# Patient Record
Sex: FEMALE | Race: WHITE | Employment: OTHER | URBAN - NONMETROPOLITAN AREA
[De-identification: names, ages, dates, MRNs, and addresses within clinical notes are randomized per-mention and may not be internally consistent; named-entity substitution may affect disease eponyms.]

---

## 2021-07-13 ENCOUNTER — HOSPITAL ENCOUNTER (EMERGENCY)
Age: 76
Discharge: ANOTHER HOSPITAL | End: 2021-07-14
Attending: EMERGENCY MEDICINE
Payer: MEDICARE

## 2021-07-13 ENCOUNTER — APPOINTMENT (OUTPATIENT)
Dept: GENERAL RADIOLOGY | Age: 76
End: 2021-07-13
Payer: MEDICARE

## 2021-07-13 DIAGNOSIS — I21.4 NSTEMI (NON-ST ELEVATED MYOCARDIAL INFARCTION) (HCC): Primary | ICD-10-CM

## 2021-07-13 DIAGNOSIS — I48.91 ATRIAL FIBRILLATION WITH RVR (HCC): ICD-10-CM

## 2021-07-13 LAB
ABSOLUTE EOS #: 0.23 K/UL (ref 0–0.44)
ABSOLUTE IMMATURE GRANULOCYTE: 0.04 K/UL (ref 0–0.3)
ABSOLUTE LYMPH #: 2.41 K/UL (ref 1.1–3.7)
ABSOLUTE MONO #: 0.92 K/UL (ref 0.1–1.2)
ALBUMIN SERPL-MCNC: 4.5 G/DL (ref 3.5–5.2)
ALBUMIN/GLOBULIN RATIO: 1.9 (ref 1–2.5)
ALP BLD-CCNC: 63 U/L (ref 35–104)
ALT SERPL-CCNC: 42 U/L (ref 5–33)
ANION GAP SERPL CALCULATED.3IONS-SCNC: 12 MMOL/L (ref 9–17)
AST SERPL-CCNC: 29 U/L
BASOPHILS # BLD: 1 % (ref 0–2)
BASOPHILS ABSOLUTE: 0.05 K/UL (ref 0–0.2)
BILIRUB SERPL-MCNC: 0.31 MG/DL (ref 0.3–1.2)
BNP INTERPRETATION: ABNORMAL
BUN BLDV-MCNC: 15 MG/DL (ref 8–23)
BUN/CREAT BLD: 23 (ref 9–20)
CALCIUM SERPL-MCNC: 9.2 MG/DL (ref 8.6–10.4)
CHLORIDE BLD-SCNC: 103 MMOL/L (ref 98–107)
CO2: 23 MMOL/L (ref 20–31)
CREAT SERPL-MCNC: 0.64 MG/DL (ref 0.5–0.9)
D-DIMER QUANTITATIVE: 0.37 MG/L FEU (ref 0–0.59)
DIFFERENTIAL TYPE: ABNORMAL
EOSINOPHILS RELATIVE PERCENT: 3 % (ref 1–4)
GFR AFRICAN AMERICAN: >60 ML/MIN
GFR NON-AFRICAN AMERICAN: >60 ML/MIN
GFR SERPL CREATININE-BSD FRML MDRD: ABNORMAL ML/MIN/{1.73_M2}
GFR SERPL CREATININE-BSD FRML MDRD: ABNORMAL ML/MIN/{1.73_M2}
GLUCOSE BLD-MCNC: 148 MG/DL (ref 70–99)
HCT VFR BLD CALC: 45.9 % (ref 36.3–47.1)
HEMOGLOBIN: 15.3 G/DL (ref 11.9–15.1)
IMMATURE GRANULOCYTES: 1 %
INR BLD: 1
LYMPHOCYTES # BLD: 28 % (ref 24–43)
MCH RBC QN AUTO: 30.4 PG (ref 25.2–33.5)
MCHC RBC AUTO-ENTMCNC: 33.3 G/DL (ref 28.4–34.8)
MCV RBC AUTO: 91.3 FL (ref 82.6–102.9)
MONOCYTES # BLD: 11 % (ref 3–12)
NRBC AUTOMATED: 0 PER 100 WBC
PDW BLD-RTO: 13.2 % (ref 11.8–14.4)
PLATELET # BLD: 150 K/UL (ref 138–453)
PLATELET ESTIMATE: ABNORMAL
PMV BLD AUTO: 10.4 FL (ref 8.1–13.5)
POTASSIUM SERPL-SCNC: 3.8 MMOL/L (ref 3.7–5.3)
PRO-BNP: 1046 PG/ML
PROTHROMBIN TIME: 13 SEC (ref 11.5–14.2)
RBC # BLD: 5.03 M/UL (ref 3.95–5.11)
RBC # BLD: ABNORMAL 10*6/UL
SEG NEUTROPHILS: 56 % (ref 36–65)
SEGMENTED NEUTROPHILS ABSOLUTE COUNT: 4.93 K/UL (ref 1.5–8.1)
SODIUM BLD-SCNC: 138 MMOL/L (ref 135–144)
TOTAL PROTEIN: 6.9 G/DL (ref 6.4–8.3)
TROPONIN INTERP: ABNORMAL
TROPONIN T: ABNORMAL NG/ML
TROPONIN, HIGH SENSITIVITY: 23 NG/L (ref 0–14)
WBC # BLD: 8.6 K/UL (ref 3.5–11.3)
WBC # BLD: ABNORMAL 10*3/UL

## 2021-07-13 PROCEDURE — 96375 TX/PRO/DX INJ NEW DRUG ADDON: CPT

## 2021-07-13 PROCEDURE — 96376 TX/PRO/DX INJ SAME DRUG ADON: CPT

## 2021-07-13 PROCEDURE — 36415 COLL VENOUS BLD VENIPUNCTURE: CPT

## 2021-07-13 PROCEDURE — 2500000003 HC RX 250 WO HCPCS: Performed by: EMERGENCY MEDICINE

## 2021-07-13 PROCEDURE — 84484 ASSAY OF TROPONIN QUANT: CPT

## 2021-07-13 PROCEDURE — 80053 COMPREHEN METABOLIC PANEL: CPT

## 2021-07-13 PROCEDURE — 83880 ASSAY OF NATRIURETIC PEPTIDE: CPT

## 2021-07-13 PROCEDURE — 85025 COMPLETE CBC W/AUTO DIFF WBC: CPT

## 2021-07-13 PROCEDURE — 71045 X-RAY EXAM CHEST 1 VIEW: CPT

## 2021-07-13 PROCEDURE — 99285 EMERGENCY DEPT VISIT HI MDM: CPT

## 2021-07-13 PROCEDURE — 84443 ASSAY THYROID STIM HORMONE: CPT

## 2021-07-13 PROCEDURE — 6370000000 HC RX 637 (ALT 250 FOR IP): Performed by: EMERGENCY MEDICINE

## 2021-07-13 PROCEDURE — 85379 FIBRIN DEGRADATION QUANT: CPT

## 2021-07-13 PROCEDURE — 85610 PROTHROMBIN TIME: CPT

## 2021-07-13 PROCEDURE — 93005 ELECTROCARDIOGRAM TRACING: CPT | Performed by: EMERGENCY MEDICINE

## 2021-07-13 PROCEDURE — 6360000002 HC RX W HCPCS: Performed by: EMERGENCY MEDICINE

## 2021-07-13 PROCEDURE — 2580000003 HC RX 258: Performed by: EMERGENCY MEDICINE

## 2021-07-13 RX ORDER — FENTANYL CITRATE 50 UG/ML
50 INJECTION, SOLUTION INTRAMUSCULAR; INTRAVENOUS ONCE
Status: COMPLETED | OUTPATIENT
Start: 2021-07-13 | End: 2021-07-13

## 2021-07-13 RX ORDER — DILTIAZEM HYDROCHLORIDE 5 MG/ML
10 INJECTION INTRAVENOUS ONCE
Status: COMPLETED | OUTPATIENT
Start: 2021-07-13 | End: 2021-07-13

## 2021-07-13 RX ORDER — 0.9 % SODIUM CHLORIDE 0.9 %
1000 INTRAVENOUS SOLUTION INTRAVENOUS ONCE
Status: COMPLETED | OUTPATIENT
Start: 2021-07-13 | End: 2021-07-14

## 2021-07-13 RX ORDER — ASPIRIN 81 MG/1
324 TABLET, CHEWABLE ORAL ONCE
Status: COMPLETED | OUTPATIENT
Start: 2021-07-13 | End: 2021-07-13

## 2021-07-13 RX ADMIN — DILTIAZEM HYDROCHLORIDE 10 MG: 5 INJECTION INTRAVENOUS at 23:39

## 2021-07-13 RX ADMIN — FENTANYL CITRATE 50 MCG: 50 INJECTION INTRAMUSCULAR; INTRAVENOUS at 23:39

## 2021-07-13 RX ADMIN — ASPIRIN 324 MG: 81 TABLET, CHEWABLE ORAL at 23:39

## 2021-07-13 RX ADMIN — SODIUM CHLORIDE 1000 ML: 9 INJECTION, SOLUTION INTRAVENOUS at 23:39

## 2021-07-13 ASSESSMENT — PAIN DESCRIPTION - PAIN TYPE: TYPE: ACUTE PAIN

## 2021-07-13 ASSESSMENT — PAIN DESCRIPTION - LOCATION: LOCATION: CHEST

## 2021-07-13 ASSESSMENT — PAIN SCALES - GENERAL
PAINLEVEL_OUTOF10: 7
PAINLEVEL_OUTOF10: 7

## 2021-07-14 VITALS
SYSTOLIC BLOOD PRESSURE: 115 MMHG | OXYGEN SATURATION: 94 % | WEIGHT: 195 LBS | RESPIRATION RATE: 16 BRPM | HEART RATE: 85 BPM | DIASTOLIC BLOOD PRESSURE: 71 MMHG | TEMPERATURE: 97.2 F | BODY MASS INDEX: 38.28 KG/M2 | HEIGHT: 60 IN

## 2021-07-14 LAB
EKG ATRIAL RATE: 136 BPM
EKG ATRIAL RATE: 340 BPM
EKG Q-T INTERVAL: 308 MS
EKG Q-T INTERVAL: 318 MS
EKG QRS DURATION: 72 MS
EKG QRS DURATION: 76 MS
EKG QTC CALCULATION (BAZETT): 438 MS
EKG QTC CALCULATION (BAZETT): 442 MS
EKG R AXIS: -11 DEGREES
EKG R AXIS: -16 DEGREES
EKG T AXIS: -23 DEGREES
EKG T AXIS: -9 DEGREES
EKG VENTRICULAR RATE: 114 BPM
EKG VENTRICULAR RATE: 124 BPM
PARTIAL THROMBOPLASTIN TIME: 23.7 SEC (ref 23.9–33.8)
TROPONIN INTERP: ABNORMAL
TROPONIN T: ABNORMAL NG/ML
TROPONIN, HIGH SENSITIVITY: 85 NG/L (ref 0–14)
TSH SERPL DL<=0.05 MIU/L-ACNC: 2.21 MIU/L (ref 0.3–5)

## 2021-07-14 PROCEDURE — 6360000002 HC RX W HCPCS: Performed by: EMERGENCY MEDICINE

## 2021-07-14 PROCEDURE — 96368 THER/DIAG CONCURRENT INF: CPT

## 2021-07-14 PROCEDURE — 96367 TX/PROPH/DG ADDL SEQ IV INF: CPT

## 2021-07-14 PROCEDURE — 2500000003 HC RX 250 WO HCPCS: Performed by: EMERGENCY MEDICINE

## 2021-07-14 PROCEDURE — 93010 ELECTROCARDIOGRAM REPORT: CPT | Performed by: INTERNAL MEDICINE

## 2021-07-14 PROCEDURE — 2580000003 HC RX 258: Performed by: EMERGENCY MEDICINE

## 2021-07-14 PROCEDURE — 6370000000 HC RX 637 (ALT 250 FOR IP): Performed by: EMERGENCY MEDICINE

## 2021-07-14 PROCEDURE — 85730 THROMBOPLASTIN TIME PARTIAL: CPT

## 2021-07-14 PROCEDURE — 84484 ASSAY OF TROPONIN QUANT: CPT

## 2021-07-14 PROCEDURE — 36415 COLL VENOUS BLD VENIPUNCTURE: CPT

## 2021-07-14 PROCEDURE — 96376 TX/PRO/DX INJ SAME DRUG ADON: CPT

## 2021-07-14 PROCEDURE — 96365 THER/PROPH/DIAG IV INF INIT: CPT

## 2021-07-14 PROCEDURE — 96366 THER/PROPH/DIAG IV INF ADDON: CPT

## 2021-07-14 PROCEDURE — 93005 ELECTROCARDIOGRAM TRACING: CPT | Performed by: EMERGENCY MEDICINE

## 2021-07-14 RX ORDER — DILTIAZEM HYDROCHLORIDE 5 MG/ML
INJECTION INTRAVENOUS
Status: DISCONTINUED
Start: 2021-07-14 | End: 2021-07-14 | Stop reason: HOSPADM

## 2021-07-14 RX ORDER — DEXTROSE MONOHYDRATE 50 MG/ML
INJECTION, SOLUTION INTRAVENOUS
Status: DISCONTINUED
Start: 2021-07-14 | End: 2021-07-14 | Stop reason: HOSPADM

## 2021-07-14 RX ORDER — LOSARTAN POTASSIUM 50 MG/1
50 TABLET ORAL DAILY
COMMUNITY
End: 2022-01-18 | Stop reason: ALTCHOICE

## 2021-07-14 RX ORDER — HEPARIN SODIUM 10000 [USP'U]/100ML
11.3 INJECTION, SOLUTION INTRAVENOUS CONTINUOUS
Status: DISCONTINUED | OUTPATIENT
Start: 2021-07-14 | End: 2021-07-14 | Stop reason: HOSPADM

## 2021-07-14 RX ORDER — ROSUVASTATIN CALCIUM 20 MG/1
20 TABLET, COATED ORAL DAILY
COMMUNITY
End: 2021-07-23

## 2021-07-14 RX ORDER — HEPARIN SODIUM 1000 [USP'U]/ML
2000 INJECTION, SOLUTION INTRAVENOUS; SUBCUTANEOUS PRN
Status: DISCONTINUED | OUTPATIENT
Start: 2021-07-14 | End: 2021-07-14 | Stop reason: HOSPADM

## 2021-07-14 RX ORDER — NITROGLYCERIN 0.4 MG/1
0.4 TABLET SUBLINGUAL ONCE
Status: COMPLETED | OUTPATIENT
Start: 2021-07-14 | End: 2021-07-14

## 2021-07-14 RX ORDER — AMLODIPINE BESYLATE 10 MG/1
10 TABLET ORAL DAILY
COMMUNITY
End: 2021-07-23 | Stop reason: ALTCHOICE

## 2021-07-14 RX ORDER — HEPARIN SODIUM 1000 [USP'U]/ML
4000 INJECTION, SOLUTION INTRAVENOUS; SUBCUTANEOUS ONCE
Status: COMPLETED | OUTPATIENT
Start: 2021-07-14 | End: 2021-07-14

## 2021-07-14 RX ORDER — OMEPRAZOLE 20 MG/1
40 CAPSULE, DELAYED RELEASE ORAL DAILY
COMMUNITY

## 2021-07-14 RX ORDER — METOPROLOL SUCCINATE 50 MG/1
50 TABLET, EXTENDED RELEASE ORAL DAILY
COMMUNITY
End: 2021-07-23

## 2021-07-14 RX ORDER — BUMETANIDE 1 MG/1
1.5 TABLET ORAL DAILY
COMMUNITY

## 2021-07-14 RX ORDER — HEPARIN SODIUM 1000 [USP'U]/ML
4000 INJECTION, SOLUTION INTRAVENOUS; SUBCUTANEOUS PRN
Status: DISCONTINUED | OUTPATIENT
Start: 2021-07-14 | End: 2021-07-14 | Stop reason: HOSPADM

## 2021-07-14 RX ADMIN — HEPARIN SODIUM 4000 UNITS: 1000 INJECTION INTRAVENOUS; SUBCUTANEOUS at 02:25

## 2021-07-14 RX ADMIN — HEPARIN SODIUM AND DEXTROSE 11.3 UNITS/KG/HR: 10000; 5 INJECTION INTRAVENOUS at 02:27

## 2021-07-14 RX ADMIN — NITROGLYCERIN 0.4 MG: 0.4 TABLET SUBLINGUAL at 02:24

## 2021-07-14 RX ADMIN — DILTIAZEM HYDROCHLORIDE 5 MG/HR: 5 INJECTION INTRAVENOUS at 02:13

## 2021-07-14 ASSESSMENT — ENCOUNTER SYMPTOMS
WHEEZING: 0
COLOR CHANGE: 0
BACK PAIN: 0
NAUSEA: 0
ABDOMINAL PAIN: 0
VOMITING: 0
RHINORRHEA: 0
SHORTNESS OF BREATH: 0

## 2021-07-14 ASSESSMENT — PAIN DESCRIPTION - PAIN TYPE
TYPE: ACUTE PAIN
TYPE: ACUTE PAIN

## 2021-07-14 ASSESSMENT — PAIN DESCRIPTION - LOCATION: LOCATION: CHEST

## 2021-07-14 ASSESSMENT — PAIN DESCRIPTION - DESCRIPTORS: DESCRIPTORS: DISCOMFORT

## 2021-07-14 ASSESSMENT — PAIN SCALES - GENERAL
PAINLEVEL_OUTOF10: 2
PAINLEVEL_OUTOF10: 3

## 2021-07-14 NOTE — ED NOTES
[address] for transfer to [employer], connected call to Dr Lori Yun.       Kee Shelton  07/14/21 0207

## 2021-07-14 NOTE — ED NOTES
[address], [facility], [facility], [facility], and [address] [facility] do not have any available beds. Trying to find available beds.       Antwan Kaufman  07/14/21 0590

## 2021-07-14 NOTE — ED NOTES
Writer attempted to call report at this time, Rehana Elizondo was told nurse was busy with hand-off. Writer provided name and number for nurse to call back for report when ready.       Stephenie Dumont RN  07/14/21 5864

## 2021-07-14 NOTE — ED NOTES
[facility] called with [facility], connected call to Dr Tristan Schwab. They stated that [facility] [address] currently does not have any beds available.       Keshav Bertrand  07/14/21 1122

## 2021-07-14 NOTE — ED PROVIDER NOTES
[facility] ED  Emergency Department Encounter  EmergencyMedicine Attending     Pt Cheryle Sang  MRN: [id number]  [place] 1945  Date of evaluation: 7/13/21  PCP:  Leland Garcia, [address] [place] [address]       Chief Complaint   Patient presents with    Chest Pain     onset 1030pm, left sided    Palpitations       HISTORY OF PRESENT ILLNESS  (Location/Symptom, Timing/Onset, Context/Setting, Quality, Duration, Modifying Factors, Severity.)      Taj Isaac is a 68 y.o. female who presents with palpitations. New onset of atrial fibrillation. Started having this chest pressure at 10:30 PM, left-sided, no radiations. No history of coronary artery disease. Cardiac risk factors include hypertension and hyperlipidemia. No previous history of A. fib. Pain is 6 out of 10, was given nitro after which pain resolved. No cough congestion runny nose, no asymmetrical leg swelling, no immobilization. No history of a PE or DVT. No nausea vomiting or diarrhea, constipation. No abdominal pain. Pain is constant since 1030, pressure-like. PAST MEDICAL / SURGICAL / SOCIAL / FAMILY HISTORY      has a past medical history of Hyperlipidemia and Hypertension. has a past surgical history that includes Hysterectomy and joint replacement (Bilateral).     Social History     Socioeconomic History    Marital status:      Spouse name: Not on file    Number of children: Not on file    Years of education: Not on file    Highest education level: Not on file   Occupational History    Not on file   Tobacco Use    Smoking status: Never Smoker    Smokeless tobacco: Never Used   Substance and Sexual Activity    Alcohol use: Not Currently    Drug use: Never    Sexual activity: Not on file   Other Topics Concern    Not on file   Social History Narrative    Not on file     Social Determinants of Health     Financial Resource Strain:     Difficulty of Paying Living Expenses:    Food Insecurity:     Worried About [facility] in the Last Year:    [address] in the Last Year:    Transportation Needs:     Lack of Transportation (Medical):  Lack of Transportation (Non-Medical):    Physical Activity:     Days of Exercise per Week:     Minutes of Exercise per Session:    Stress:     Feeling of Stress :    Social Connections:     Frequency of Communication with Friends and Family:     Frequency of Social Gatherings with Friends and Family:     Attends Confucianism Services:     Active Member of Clubs or Organizations:     Attends Club or Organization Meetings:     Marital Status:    Intimate Partner Violence:     Fear of Current or Ex-Partner:     Emotionally Abused:     Physically Abused:     Sexually Abused:        History reviewed. No pertinent family history. Allergies:  Patient has no known allergies. Home Medications:  Prior to Admission medications    Medication Sig Start Date End Date Taking? Authorizing Provider   amLODIPine (NORVASC) 10 MG tablet Take 10 mg by mouth daily   Yes Historical Provider, MD   losartan (COZAAR) 50 MG tablet Take 50 mg by mouth daily   Yes Historical Provider, MD   rosuvastatin (CRESTOR) 20 MG tablet Take 20 mg by mouth daily   Yes Historical Provider, MD   bumetanide (BUMEX) 1 MG tablet Take 1.5 mg by mouth daily   Yes Historical Provider, MD   omeprazole (PRILOSEC) 20 MG delayed release capsule Take 40 mg by mouth daily   Yes Historical Provider, MD   metoprolol succinate (TOPROL XL) 50 MG extended release tablet Take 50 mg by mouth daily   Yes Historical Provider, MD       REVIEW OF SYSTEMS    (2-9 systems for level 4, 10 or more for level 5)      Review of Systems   Constitutional: Negative for chills and fever. HENT: Negative for congestion and rhinorrhea. Respiratory: Negative for shortness of breath and wheezing. Cardiovascular: Positive for chest pain. Negative for leg swelling.    Gastrointestinal: Negative for abdominal pain, nausea and vomiting. Genitourinary: Negative for dysuria and hematuria. Musculoskeletal: Negative for back pain. Skin: Negative for color change. Neurological: Negative for weakness and headaches. Psychiatric/Behavioral: Negative for agitation. PHYSICAL EXAM   (up to 7 for level 4, 8 or more for level 5)      INITIAL VITALS:   BP (!) 133/99   Pulse 117   Temp 97.2 °F (36.2 °C)   Resp 16   Ht 5' (1.524 m)   Wt 195 lb (88.5 kg)   SpO2 94%   BMI 38.08 kg/m²     Physical Exam  Constitutional:       General: She is not in acute distress. Appearance: She is well-developed. HENT:      Head: Normocephalic and atraumatic. Eyes:      General:         Right eye: No discharge. Left eye: No discharge. Conjunctiva/sclera: Conjunctivae normal.   Cardiovascular:      Rate and Rhythm: Tachycardia present. Rhythm irregular. Heart sounds: Normal heart sounds. No murmur heard. No friction rub. No gallop. Comments: A. fib RVR  Pulmonary:      Effort: Pulmonary effort is normal. No respiratory distress. Breath sounds: Normal breath sounds. No wheezing or rales. Abdominal:      General: There is no distension. Palpations: Abdomen is soft. Tenderness: There is no abdominal tenderness. There is no guarding or rebound. Musculoskeletal:         General: No swelling or tenderness. Comments: No asymmetrical leg swelling, no calf tenderness on examination bilaterally. Skin:     General: Skin is warm. Findings: No erythema. Neurological:      Mental Status: She is alert.          DIFFERENTIAL  DIAGNOSIS     PLAN (LABS / IMAGING / EKG):  Orders Placed This Encounter   Procedures    XR CHEST PORTABLE    CBC Auto Differential    Troponin    Brain Natriuretic Peptide    Protime-INR    Comprehensive Metabolic Panel    D-Dimer, Quantitative    TSH with Reflex    Troponin    APTT    EKG 12 Lead    EKG 12 Lead       MEDICATIONS ORDERED:  Orders Placed This Encounter   Medications    fentaNYL (SUBLIMAZE) injection 50 mcg    aspirin chewable tablet 324 mg    dilTIAZem injection 10 mg    0.9 % sodium chloride bolus    dilTIAZem 125 mg in dextrose 5 % 125 mL infusion    DISCONTD: apixaban (ELIQUIS) tablet 5 mg    dextrose 5 % solution     Paul, Faith: cabinet override    dilTIAZem 125 MG/25ML injection     Paul, Faith: cabinet override    heparin (porcine) injection 4,000 Units    heparin (porcine) injection 4,000 Units    heparin (porcine) injection 2,000 Units    heparin 25,000 units in dextrose 5% 250 mL (premix) infusion    nitroGLYCERIN (NITROSTAT) SL tablet 0.4 mg       DDX: ACS versus STEMI versus dissection versus pneumonia versus tamponade versus musculoskeletal chest pain  versus PE     DIAGNOSTIC RESULTS / EMERGENCY DEPARTMENT COURSE / MDM   :  Results for orders placed or performed during the hospital encounter of 07/13/21   CBC Auto Differential   Result Value Ref Range    WBC 8.6 3.5 - 11.3 k/uL    RBC 5.03 3.95 - 5.11 m/uL    Hemoglobin 15.3 (H) 11.9 - 15.1 g/dL    Hematocrit 45.9 36.3 - 47.1 %    MCV 91.3 82.6 - 102.9 fL    MCH 30.4 25.2 - 33.5 pg    MCHC 33.3 28.4 - 34.8 g/dL    RDW 13.2 11.8 - 14.4 %    Platelets 983 509 - 673 k/uL    MPV 10.4 8.1 - 13.5 fL    NRBC Automated 0.0 0.0 per 100 WBC    Differential Type NOT REPORTED     Seg Neutrophils 56 36 - 65 %    Lymphocytes 28 24 - 43 %    Monocytes 11 3 - 12 %    Eosinophils % 3 1 - 4 %    Basophils 1 0 - 2 %    Immature Granulocytes 1 (H) 0 %    Segs Absolute 4.93 1.50 - 8.10 k/uL    Absolute Lymph # 2.41 1.10 - 3.70 k/uL    Absolute Mono # 0.92 0.10 - 1.20 k/uL    Absolute Eos # 0.23 0.00 - 0.44 k/uL    Basophils Absolute 0.05 0.00 - 0.20 k/uL    Absolute Immature Granulocyte 0.04 0.00 - 0.30 k/uL    WBC Morphology NOT REPORTED     RBC Morphology NOT REPORTED     Platelet Estimate NOT REPORTED    Troponin   Result Value Ref Range    Troponin, High Sensitivity 23 (H) 0 - 14 ng/L Troponin T NOT REPORTED <0.03 ng/mL    Troponin Interp NOT REPORTED    Brain Natriuretic Peptide   Result Value Ref Range    Pro-BNP 1,046 (H) <300 pg/mL    BNP Interpretation Pro-BNP Reference Range:    Protime-INR   Result Value Ref Range    Protime 13.0 11.5 - 14.2 sec    INR 1.0    Comprehensive Metabolic Panel   Result Value Ref Range    Glucose 148 (H) 70 - 99 mg/dL    BUN 15 8 - 23 mg/dL    CREATININE 0.64 0.50 - 0.90 mg/dL    Bun/Cre Ratio 23 (H) 9 - 20    Calcium 9.2 8.6 - 10.4 mg/dL    Sodium 138 135 - 144 mmol/L    Potassium 3.8 3.7 - 5.3 mmol/L    Chloride 103 98 - 107 mmol/L    CO2 23 20 - 31 mmol/L    Anion Gap 12 9 - 17 mmol/L    Alkaline Phosphatase 63 35 - 104 U/L    ALT 42 (H) 5 - 33 U/L    AST 29 <32 U/L    Total Bilirubin 0.31 0.3 - 1.2 mg/dL    Total Protein 6.9 6.4 - 8.3 g/dL    Albumin 4.5 3.5 - 5.2 g/dL    Albumin/Globulin Ratio 1.9 1.0 - 2.5    GFR Non-African American >60 >60 mL/min    GFR African American >60 >60 mL/min    GFR Comment          GFR Staging         D-Dimer, Quantitative   Result Value Ref Range    D-Dimer, Quant 0.37 0.00 - 0.59 mg/L FEU   TSH with Reflex   Result Value Ref Range    TSH 2.21 0.30 - 5.00 mIU/L   Troponin   Result Value Ref Range    Troponin, High Sensitivity 85 (HH) 0 - 14 ng/L    Troponin T NOT REPORTED <0.03 ng/mL    Troponin Interp NOT REPORTED    APTT   Result Value Ref Range    PTT 23.7 (L) 23.9 - 33.8 sec   EKG 12 Lead   Result Value Ref Range    Ventricular Rate 124 BPM    Atrial Rate 136 BPM    QRS Duration 72 ms    Q-T Interval 308 ms    QTc Calculation (Bazett) 442 ms    R Axis -16 degrees    T Axis -9 degrees   EKG 12 Lead   Result Value Ref Range    Ventricular Rate 114 BPM    Atrial Rate 340 BPM    QRS Duration 76 ms    Q-T Interval 318 ms    QTc Calculation (Bazett) 438 ms    R Axis -11 degrees    T Axis -23 degrees       IMPRESSION: 75-year-old female who presents to the emergency department secondary to chest pressure, as well as what appears to be atrial fibrillation with RVR. Full cardiac work-up was done including 2 troponins, first troponin was fairly unremarkable however significant elevated troponin on the second 1. Delta troponin was positive. Concern for NSTEMI. Patient was given aspirin, started on heparin. Also given nitro after which the pain did resolve. Patient was also given a Cardizem bolus, rate did improve after that however became tachycardic again after which Cardizem infusion was started. RADIOLOGY:    XR CHEST PORTABLE    Result Date: 7/13/2021  EXAMINATION: ONE XRAY VIEW OF THE CHEST 7/13/2021 5:20 pm COMPARISON: None. HISTORY: ORDERING SYSTEM PROVIDED HISTORY: Chest pain TECHNOLOGIST PROVIDED HISTORY: Chest pain FINDINGS: Marginal inspiration is present. Cardiomegaly is noted. Vascular markings are distinct. No focal area of consolidation or pneumothorax is noted. Evidence of old granulomatous disease is present. Degenerative changes are present within the spine. Cardiomegaly, without evidence of CHF       EKG    EKG Interpretation    Interpreted by me    Rhythm: A fib  Rate: 114  Axis: normal  Ectopy: none  Conduction: normal  ST Segments: non specific  T Waves: no acute change  Q Waves: none    Clinical Impression: no STEMI. A fib RVR    All EKG's are interpreted by the Emergency Department Physician who either signs or Co-signs this chart in the absence of a cardiologist.    EMERGENCY DEPARTMENT COURSE:    Given the NSTEMI, I believe patient requires higher level of care, plan to transfer to higher level of care. We checked with multiple hospitals, [facility], [address] [place] [address] , Critical access hospital, [address] [place] [address] and [facility] were all full. We were not able to find anywhere for the patient to go to. Eventually we tried [facility] and we were finally able to get the patient placement for higher level of care to see interventional cardiology for her A. fib RVR and NSTEMI.

## 2021-07-23 ENCOUNTER — TELEPHONE (OUTPATIENT)
Dept: CARDIOLOGY | Age: 76
End: 2021-07-23

## 2021-07-23 RX ORDER — SOTALOL HYDROCHLORIDE 80 MG/1
80 TABLET ORAL 2 TIMES DAILY
COMMUNITY
Start: 2021-07-16 | End: 2022-01-27 | Stop reason: SDUPTHER

## 2021-07-23 RX ORDER — RIVAROXABAN 20 MG/1
20 TABLET, FILM COATED ORAL
COMMUNITY
Start: 2021-07-16 | End: 2022-04-07 | Stop reason: SDUPTHER

## 2021-07-23 RX ORDER — ATORVASTATIN CALCIUM 20 MG/1
20 TABLET, FILM COATED ORAL DAILY
Qty: 30 TABLET | Refills: 0
Start: 2021-07-23 | End: 2021-08-03

## 2021-07-23 NOTE — PROGRESS NOTES
Med list updated per pt. Did add Atorvastatin to her list and pushed no print until Dr. Thang Buck approved to refill this med or not.

## 2021-07-23 NOTE — TELEPHONE ENCOUNTER
Ms. Aden Caraballo called into the office this AM and was scheduling a new pt apt with us. She requested to be your new pt and I scheduled her for your next available which is Sept 27 at 2:40 PM. She is having no symptoms at this time. however she was wondering if we could refill her Sotalol, Xarelto and Atorvastatin for her. She was seen by Dr. Rebecca Lugo in [place] (I am calling to get her records) however she was only given 30 days for these medications. I updated he med list for her and I suggested she ask Dr. Rebecca Lugo office first for refills just due to us never seeing her before however I did let her know that I would ask you to. I will follow up with her on if Dr. Rebecca Lugo office was able to fill them before hand. I also suggest she ask her PCP as well. Thanks!

## 2021-07-26 NOTE — TELEPHONE ENCOUNTER
Spoke with Lyly Brewer o the phone and apt made for 8/3/21 when he gets back from vacation. Pt did not want to do a VV.

## 2021-08-03 ENCOUNTER — OFFICE VISIT (OUTPATIENT)
Dept: CARDIOLOGY | Age: 76
End: 2021-08-03
Payer: MEDICARE

## 2021-08-03 VITALS
DIASTOLIC BLOOD PRESSURE: 84 MMHG | BODY MASS INDEX: 40.84 KG/M2 | OXYGEN SATURATION: 96 % | WEIGHT: 208 LBS | SYSTOLIC BLOOD PRESSURE: 133 MMHG | HEIGHT: 60 IN | HEART RATE: 87 BPM | RESPIRATION RATE: 18 BRPM

## 2021-08-03 DIAGNOSIS — I48.0 PAROXYSMAL ATRIAL FIBRILLATION (HCC): ICD-10-CM

## 2021-08-03 DIAGNOSIS — I21.4 NON-ST ELEVATION MYOCARDIAL INFARCTION (NSTEMI) (HCC): ICD-10-CM

## 2021-08-03 DIAGNOSIS — I25.10 CORONARY ARTERY DISEASE INVOLVING NATIVE CORONARY ARTERY OF NATIVE HEART WITHOUT ANGINA PECTORIS: Primary | ICD-10-CM

## 2021-08-03 DIAGNOSIS — E78.2 MIXED HYPERLIPIDEMIA: ICD-10-CM

## 2021-08-03 DIAGNOSIS — I10 ESSENTIAL HYPERTENSION: ICD-10-CM

## 2021-08-03 PROBLEM — I48.91 ATRIAL FIBRILLATION (HCC): Status: ACTIVE | Noted: 2021-08-03

## 2021-08-03 PROBLEM — I21.9 MYOCARDIAL INFARCTION (HCC): Status: ACTIVE | Noted: 2021-08-03

## 2021-08-03 PROCEDURE — 1123F ACP DISCUSS/DSCN MKR DOCD: CPT | Performed by: FAMILY MEDICINE

## 2021-08-03 PROCEDURE — G8417 CALC BMI ABV UP PARAM F/U: HCPCS | Performed by: FAMILY MEDICINE

## 2021-08-03 PROCEDURE — 99214 OFFICE O/P EST MOD 30 MIN: CPT | Performed by: FAMILY MEDICINE

## 2021-08-03 PROCEDURE — 1090F PRES/ABSN URINE INCON ASSESS: CPT | Performed by: FAMILY MEDICINE

## 2021-08-03 PROCEDURE — G8400 PT W/DXA NO RESULTS DOC: HCPCS | Performed by: FAMILY MEDICINE

## 2021-08-03 PROCEDURE — G8427 DOCREV CUR MEDS BY ELIG CLIN: HCPCS | Performed by: FAMILY MEDICINE

## 2021-08-03 PROCEDURE — 99202 OFFICE O/P NEW SF 15 MIN: CPT | Performed by: FAMILY MEDICINE

## 2021-08-03 PROCEDURE — 1036F TOBACCO NON-USER: CPT | Performed by: FAMILY MEDICINE

## 2021-08-03 PROCEDURE — 4040F PNEUMOC VAC/ADMIN/RCVD: CPT | Performed by: FAMILY MEDICINE

## 2021-08-03 RX ORDER — ATORVASTATIN CALCIUM 40 MG/1
TABLET, FILM COATED ORAL
COMMUNITY
Start: 2021-07-27 | End: 2021-08-03 | Stop reason: ALTCHOICE

## 2021-08-03 RX ORDER — CALCIUM CARBONATE 500(1250)
600 TABLET ORAL 2 TIMES DAILY
COMMUNITY

## 2021-08-03 RX ORDER — METOPROLOL SUCCINATE 50 MG/1
50 TABLET, EXTENDED RELEASE ORAL DAILY
Qty: 90 TABLET | Refills: 3 | Status: SHIPPED | OUTPATIENT
Start: 2021-08-03

## 2021-08-03 RX ORDER — ROSUVASTATIN CALCIUM 20 MG/1
20 TABLET, COATED ORAL DAILY
Qty: 90 TABLET | Refills: 3 | Status: SHIPPED | OUTPATIENT
Start: 2021-08-03

## 2021-08-03 RX ORDER — DOCUSATE SODIUM 100 MG/1
100 CAPSULE, LIQUID FILLED ORAL 2 TIMES DAILY
COMMUNITY

## 2021-08-03 RX ORDER — ASPIRIN 81 MG/1
81 TABLET ORAL DAILY
COMMUNITY

## 2021-08-03 NOTE — PROGRESS NOTES
Risks: Ms. Teodoro Muñoz denies any current or recent bleeding problems including a history of a GI bleed, ulcers, recent or upcoming surgeries, blood in her stool or black tarry stools or blood in her urine. Exercise Tolerance: Ms. Teodoro Muñoz reports that she has a fairly good exercise tolerance. Her says that she could walk 1/2 a mile without developing chest discomfort or significant shortness of breath. Her legs would get to tired if she went any further. PAST MEDICAL HISTORY:         Past Medical History:   Diagnosis Date    Hyperlipidemia     Hypertension        CURRENT ALLERGIES: Patient has no known allergies. REVIEW OF SYSTEMS: 14 systems were reviewed. Pertinent positives and negatives as above, all else negative. Past Surgical History:   Procedure Laterality Date    HYSTERECTOMY      JOINT REPLACEMENT Bilateral     Social History:  Social History     Tobacco Use    Smoking status: Never Smoker    Smokeless tobacco: Never Used   Substance Use Topics    Alcohol use: Not Currently    Drug use: Never        CURRENT MEDICATIONS:        Outpatient Medications Marked as Taking for the 8/3/21 encounter (Office Visit) with Russ Rizvi MD   Medication Sig Dispense Refill    atorvastatin (LIPITOR) 40 MG tablet       aspirin 81 MG EC tablet Take 81 mg by mouth daily      calcium carbonate (OSCAL) 500 MG TABS tablet Take 600 mg by mouth daily      Multiple Vitamins-Minerals (CENTRUM SILVER 50+WOMEN PO) Take by mouth      docusate sodium (COLACE) 100 MG capsule Take 100 mg by mouth 2 times daily      XARELTO 20 MG TABS tablet       sotalol (BETAPACE) 80 MG tablet       losartan (COZAAR) 50 MG tablet Take 50 mg by mouth daily      bumetanide (BUMEX) 1 MG tablet Take 1.5 mg by mouth daily      omeprazole (PRILOSEC) 20 MG delayed release capsule Take 40 mg by mouth daily         FAMILY HISTORY: family history is not on file.      Physical Examination:     /84 (Site: Left Upper Arm, Position: Sitting, Cuff Size: Large Adult)   Pulse 87   Resp 18   Ht 5' (1.524 m)   Wt 208 lb (94.3 kg)   SpO2 96%   BMI 40.62 kg/m²  Body mass index is 40.62 kg/m². Constitutional: She appeared oriented to person and place. She appears well-developed and well-nourished. In no acute distress. HEENT: Normocephalic and atraumatic. No JVD present. Carotid bruit is not present. No mass and no thyromegaly present. No lymphadenopathy noted. Cardiovascular: Normal rate, regular rhythm, normal heart sounds. Exam reveals no gallop and no friction rubs. 2/6 systolic murmur, 5th intercostal space on the LEFT in the mid-clavicular line (cardiac apex). Pulmonary/Chest: Effort normal and breath sounds normal. No respiratory distress. She has no wheezes, rhonchi or rales. Abdominal: Soft, non-tender. She exhibits no organomegaly, mass or bruit. Extremities: None. No cyanosis or clubbing. 2+ radial and carotid pulses. Distal extremity pulses: 2+ bilaterally. Neurological: Alertness and orientation as per Constitutional exam. No evidence of gross cranial nerve deficit. Coordination appeared normal.   Skin: Skin is warm and dry. There is no rash or diaphoresis. Psychiatric: She has a normal mood and affect. Her speech is normal and behavior is normal.      MOST RECENT LABS ON RECORD:   Lab Results   Component Value Date    WBC 8.6 07/13/2021    HGB 15.3 (H) 07/13/2021    HCT 45.9 07/13/2021     07/13/2021    ALT 42 (H) 07/13/2021    AST 29 07/13/2021     07/13/2021    K 3.8 07/13/2021     07/13/2021    CREATININE 0.64 07/13/2021    BUN 15 07/13/2021    CO2 23 07/13/2021    TSH 2.21 07/13/2021    INR 1.0 07/13/2021       ASSESSMENT:     1. Coronary artery disease involving native coronary artery of native heart without angina pectoris    2. Non-ST elevation myocardial infarction (NSTEMI) (HCC)    3. Paroxysmal atrial fibrillation ([address]    4. Essential hypertension    5.  Mixed hyperlipidemia       PLAN:  Atherosclerotic Heart Disease: Recent NSTEMI as outlined above. Also S/P Heart Cath done on 7/14/2021 and no stents were needed at that time. Currently Stable at this time.  Antiplatelet Agent: Continue Aspirin 81 mg daily.  Beta Blocker: We also discussed the use of beta blocker therapy in great detail. I am assuming she was taking off of her Toprol XL while admitted in [place] due to her starting her Sotalol and the possibly of her heart rate going to low. today her HR is 87 bpm and so with that we will RE-START her on Toprol XL 50 mg daily. Also to better protect her from having another heart attack.  did discuss the possible need for a beta blocker in the future. We will get her Echo results from [place] and we will follow up with this. Toprol XL    Cholesterol Reduction Therapy: We also discuss that she was started on Lipitor and her Crestor was stopped. I do believe this was due to there in house pharmacy not having Crestor. We did discuss that Crestor is actually the more superior to Lipitor and she was ok with going back on Crestor 20 mg daily.  Additional counseling: I advised them to call our office or go to the emergency room if they developed worsening or persistent chest pain or increased shortness of breath as this could be life threatening.  We discussed the potential benefits of cardiac rehab to improve both her cardiac condition as well as improve her exercise tolerance and overall quality of life. She was very agreeable with this and therefore I made the referral for Phase II cardiac rehab. · Paroxysmal Atrial Fibrillation: Rhythm Control Asymptomatic. We did discuss in great detail the etiology of her atrial fibrillation and her new diagnosis of atrial fibrillation.  Beta Blocker: RE-START Metoprolol succinate (Toprol XL) 50 mg daily (as above).  I also discussed the potential side effects of this medication including lightheadedness and dizziness and instructed evaluate this. Pro BNP done on 7/13/2021.  Beta Blocker: RE-START Metoprolol succinate (Toprol XL) 50 mg daily as above.  ACE Inibitor/ARB: Continue losartan (Cozaar) 50 mg daily.  Diuretics: Continue bumetinide (Bumex) 1.5 mg every morning. I also discussed the potential side effects of this medication including lightheadedness and dizziness and instructed them to stop the medication of this occurs and call our office if this occurs. Finally, I recommended that she continue her current medications and follow up with you as previously scheduled. FOLLOW UP:   I told Ms. Oli Campos to call my office if she had any problems, but otherwise I asked her to Return in about 6 weeks (around 9/14/2021). However, I would be happy to see her sooner should the need arise. Sincerely,  Marci Tovar. Bronson B[place]INGH[place], MS, F.A.C.C. [facility] Cardiology Specialist    [address] [place] [address]  Phone: [phone], Fax: [phone]     I believe that the risk of significant morbidity and mortality related to the patient's current medical conditions are: intermediate-high. >60 minutes were spent during prep work, discussion and exam of the patient, and follow up documentation and all of their questions were answered. The documentation recorded by the scribe, accurately and completely reflects the services I personally performed and the decisions made by me. Héctor Trejo MD, MS, F.A.C.C.  August 3, 2021

## 2021-08-03 NOTE — PATIENT INSTRUCTIONS
SURVEY:    You may be receiving a survey from [employer] regarding your visit today. Please complete the survey to enable us to provide the highest quality of care to you and your family. If you cannot score us a very good on any question, please call the office to discuss how we could have made your experience a very good one. Thank you.

## 2021-08-13 ENCOUNTER — HOSPITAL ENCOUNTER (OUTPATIENT)
Dept: CARDIAC REHAB | Age: 76
Setting detail: THERAPIES SERIES
Discharge: HOME | End: 2021-08-13
Payer: MEDICARE

## 2021-08-13 VITALS
HEIGHT: 60 IN | BODY MASS INDEX: 39.27 KG/M2 | SYSTOLIC BLOOD PRESSURE: 138 MMHG | DIASTOLIC BLOOD PRESSURE: 78 MMHG | HEART RATE: 60 BPM | OXYGEN SATURATION: 96 % | WEIGHT: 200 LBS | RESPIRATION RATE: 16 BRPM

## 2021-08-13 ASSESSMENT — PATIENT HEALTH QUESTIONNAIRE - PHQ9
SUM OF ALL RESPONSES TO PHQ QUESTIONS 1-9: 2
SUM OF ALL RESPONSES TO PHQ QUESTIONS 1-9: 2

## 2021-08-13 NOTE — PROGRESS NOTES
Cardiac Rehab Initial History and Assessment    Trena Guevara 1945 8/13/2021    Primary Diagnosis: NSTEMI  Living Will: No   On File: Unsure  Durable Power of :Yes      Medical History  Past Medical History:   Diagnosis Date    CAD (coronary artery disease)     Hyperlipidemia     Hypertension      Past Surgical History:   Procedure Laterality Date    HYSTERECTOMY      JOINT REPLACEMENT Bilateral        Family History  Family History   Problem Relation Age of Onset    Heart Attack Father     Cancer Sister     Atrial Fibrillation Sister     Heart Disease Brother     Diabetes Brother     Atrial Fibrillation Sister          Symptoms:  1. Angina   [x] None   [] Tightness   [] Shortness of Breath   [] Pressure    [] Nausea   [] Sharp, Stabbing  [] Pallor   [] Indigestion, Heartburn [] Sweaty    Where was discomfort located? Precipitating Factors? Relieved by:    2. Arrhythmia   [] None   [x] Irregular Beats (skips) [] Pacer    [x] Atrial Fibrillation  [] AICD    On any Medications? 3. Congestive Heart Failure   [x] None   [] Pedal Edema  [] Unusual weight gain   [] SOB with mild exertion [] Fatigue    4. Vascular   [x] None   [] Carotid Narrowing  [] R [] L   [] Peripheral claudication [] R [] L    5. Musculoskeletal    [] None   [] Back Pain  Where? [x] Joint discomfort Where?  Knees-both replaced    Socio-Economic    Marital Status:     Nutrition    Appetite:  []  Too Good    [x]  Good  []  Poor    Diet: Low sodium  Eating out 2 times/wk or more  Alcohol Consumption: [] Yes [] No   Type:  Frequency:    Caffeine: [x] Yes [] No  Type:Decaf coffee  Amount:2 cups/day    Water intake per day: 32-48 ounces  Vitamins/Natural herbal products: Multivitamin and calcium with vitamin D    Psychological    [] Depression  [] Tearful  [] Fearful  [x] Cheerful  [] Anxious  [x] Motivated  [] Overwhelmed    Treatment: N/A    Diabetes    [] Yes  [x] No  How long:   Latest BS:   Frequency of Checks:  Medication:     Stress    Source: Denies  Relaxation techniques: Plays games on computer and watches tv  Hobbies: Grandchildren and their sporting activitis    Level of Education    [] 8th Grade  [] Associates  [] Masters  [x] [employer] [] Bachelor  []  Other:    Depression Screening:  [] Have you been feeling sad. ..down in the dumps? [] Have you lost interest in your job, sports, hobbies, friends? [x] Do you often feel tired? [x] Do you have trouble sleeping or do you sleep too much? [x] Have you been gaining or losing weight? [] Do you often feel down on yourself, that everything is your fault? [] Do you have troubled making decisions or concentrating on your work? [] Do you often feel agitated or like you can barely move? [] Do you ever feel that life isn't worth living?    *If greater than 5 symptoms listed,  notified. Cardiac Rehab Pre - Test  1. The heart is a muscle that acts like a pump to deliver oxygen and blood to the rest of the body. [x] True   [] False  2. Healing from the damage of a heart attach is complete in two weeks. [] True   [x] False  3. Smoking has no direct effect on the heart - it only effects your lungs. [] True   [x] False  4. Using all the salt you want is acceptable for all heart patients. [] True   [] False  5. Chest pain that is relieved by rest or nitroglycerine is called Angina. [x] True   [] False  6. Shortness of breath, indigestion, sweating, tightness or pain in your chest are symptoms of a heart attack. [x] True   [] False  7. Swelling of the feet and ankles only means you've been on your feet too much. [] True   [x] False  8. Saturated fats raised your blood cholesterol level more than anything else in your diet. [x] True   [] False  9. High Blood pressure can take care of itself by rest alone. [] True   [x] False  10. Walking is one of the best exercises for heart attack patients.    [x] True   [] False    Physical Findings   BP: 138/78   Pulse: 60   Resp: 16   SpO2: 96 %  Skin warm, dry and pink. Heart tones strong and slightly irregular with murmur. Lungs clear throughout. Slight-1+ pitting edema lower legs/ankles bilat. Denied chest pain, shortness of breath, dizziness/lightheadedness or other discomfort at present.     Goals:   -increased stamina/strength to 30-50 total exercise by increasing 1-2 level/wk and 1-2   min/wk  to achieve THR and RPE 11-13  -introduce weights/ therabands 2-4# for 5-10 reps  -manage BP better  -improved cholesterol and  Triglycerides  -develop regular exercise 30 min daily

## 2021-08-17 ENCOUNTER — APPOINTMENT (OUTPATIENT)
Dept: GENERAL RADIOLOGY | Age: 76
End: 2021-08-17
Payer: MEDICARE

## 2021-08-17 ENCOUNTER — HOSPITAL ENCOUNTER (EMERGENCY)
Age: 76
Discharge: HOME | End: 2021-08-17
Payer: MEDICARE

## 2021-08-17 ENCOUNTER — TELEPHONE (OUTPATIENT)
Dept: CARDIOLOGY | Age: 76
End: 2021-08-17

## 2021-08-17 VITALS
HEART RATE: 65 BPM | RESPIRATION RATE: 18 BRPM | TEMPERATURE: 98.2 F | OXYGEN SATURATION: 97 % | SYSTOLIC BLOOD PRESSURE: 161 MMHG | DIASTOLIC BLOOD PRESSURE: 82 MMHG

## 2021-08-17 DIAGNOSIS — S93.409A SPRAIN OF ANKLE, UNSPECIFIED LATERALITY, UNSPECIFIED LIGAMENT, INITIAL ENCOUNTER: Primary | ICD-10-CM

## 2021-08-17 PROCEDURE — 73590 X-RAY EXAM OF LOWER LEG: CPT

## 2021-08-17 PROCEDURE — 73610 X-RAY EXAM OF ANKLE: CPT

## 2021-08-17 PROCEDURE — 99283 EMERGENCY DEPT VISIT LOW MDM: CPT

## 2021-08-17 ASSESSMENT — PAIN DESCRIPTION - LOCATION: LOCATION: ANKLE

## 2021-08-17 ASSESSMENT — ENCOUNTER SYMPTOMS
RHINORRHEA: 0
EYE DISCHARGE: 0
VOMITING: 0
COUGH: 0
SHORTNESS OF BREATH: 0
ABDOMINAL PAIN: 0
BACK PAIN: 0
SORE THROAT: 0
BLOOD IN STOOL: 0
WHEEZING: 0
DIARRHEA: 0
EYE REDNESS: 0
NAUSEA: 0
CONSTIPATION: 0
CHEST TIGHTNESS: 0

## 2021-08-17 ASSESSMENT — PAIN DESCRIPTION - DESCRIPTORS: DESCRIPTORS: SORE

## 2021-08-17 ASSESSMENT — PAIN SCALES - GENERAL: PAINLEVEL_OUTOF10: 6

## 2021-08-17 NOTE — ED PROVIDER NOTES
[facility] ED  EMERGENCY DEPARTMENT ENCOUNTER      Pt Name: Yarely Laughlin  MRN: [id number]  [place] 1945  Date of evaluation: 8/17/2021  Provider: Joseph Fletcher Dr     Chief Complaint   Patient presents with    Ankle Pain     leftl tripped on stairs Sunday evening; denies head injury and LOC         HISTORY OF PRESENT ILLNESS   (Location/Symptom, Timing/Onset, Context/Setting,Quality, Duration, Modifying Factors, Severity)  Note limiting factors. Yarely Laughlin is a72 y.o. female who presents to the emergency department with complaints of left ankle injury onset 2 days ago. Patient reports that she tripped and slipped on a step injuring her left ankle. She denies any head injury or loss of consciousness. Reports she is on a blood thinner and she had some bruising at her ankle so she came here for further evaluation. She denies any numbness or tingling sensation. Denies knee or hip pain. Denies neck or back pain. Rates her pain a 6 out of 10. Reports still been able to walk. Denies foot pain. No other complaints at this time. HPI    Nursing Notes werereviewed. REVIEW OF SYSTEMS    (2-9 systems for level 4, 10 or more for level 5)     Review of Systems   Constitutional: Negative for chills, diaphoresis and fever. HENT: Negative for congestion, ear pain, rhinorrhea and sore throat. Eyes: Negative for discharge, redness and visual disturbance. Respiratory: Negative for cough, chest tightness, shortness of breath and wheezing. Cardiovascular: Negative for chest pain and palpitations. Gastrointestinal: Negative for abdominal pain, blood in stool, constipation, diarrhea, nausea and vomiting. Endocrine: Negative for polydipsia, polyphagia and polyuria. Genitourinary: Negative for decreased urine volume, difficulty urinating, dysuria, frequency and hematuria. Musculoskeletal: Positive for arthralgias. Negative for back pain and myalgias.    Skin: Negative for pallor and rash. Allergic/Immunologic: Negative for food allergies and immunocompromised state. Neurological: Negative for dizziness, syncope, weakness and light-headedness. Hematological: Negative for adenopathy. Does not bruise/bleed easily. Psychiatric/Behavioral: Negative for behavioral problems and suicidal ideas. The patient is not nervous/anxious. Except as noted above the remainder of the review of systems was reviewed and negative. PAST MEDICAL HISTORY     Past Medical History:   Diagnosis Date    CAD (coronary artery disease)     Hyperlipidemia     Hypertension          SURGICALHISTORY       Past Surgical History:   Procedure Laterality Date    HYSTERECTOMY      JOINT REPLACEMENT Bilateral          CURRENT MEDICATIONS       Previous Medications    ASPIRIN 81 MG EC TABLET    Take 81 mg by mouth daily    BUMETANIDE (BUMEX) 1 MG TABLET    Take 1.5 mg by mouth daily    CALCIUM CARBONATE (OSCAL) 500 MG TABS TABLET    Take 600 mg by mouth 2 times daily     DOCUSATE SODIUM (COLACE) 100 MG CAPSULE    Take 100 mg by mouth 2 times daily    LOSARTAN (COZAAR) 50 MG TABLET    Take 50 mg by mouth daily    METOPROLOL SUCCINATE (TOPROL XL) 50 MG EXTENDED RELEASE TABLET    Take 1 tablet by mouth daily    MULTIPLE VITAMINS-MINERALS (CENTRUM SILVER 50+WOMEN PO)    Take by mouth    OMEPRAZOLE (PRILOSEC) 20 MG DELAYED RELEASE CAPSULE    Take 40 mg by mouth daily    ROSUVASTATIN (CRESTOR) 20 MG TABLET    Take 1 tablet by mouth daily    SOTALOL (BETAPACE) 80 MG TABLET    Take 80 mg by mouth 2 times daily     XARELTO 20 MG TABS TABLET    Take 20 mg by mouth daily (with breakfast)          ALLERGIES   Patient has no known allergies.     FAMILY HISTORY       Family History   Problem Relation Age of Onset    Heart Attack Father     Cancer Sister     Atrial Fibrillation Sister     Heart Disease Brother     Diabetes Brother     Atrial Fibrillation Sister           SOCIAL HISTORY       Social History Socioeconomic History    Marital status:      Spouse name: Not on file    Number of children: Not on file    Years of education: Not on file    Highest education level: Not on file   Occupational History    Not on file   Tobacco Use    Smoking status: Never Smoker    Smokeless tobacco: Never Used   Substance and Sexual Activity    Alcohol use: Not Currently    Drug use: Never    Sexual activity: Not on file   Other Topics Concern    Not on file   Social History Narrative    Not on file     Social Determinants of Health     Financial Resource Strain:     Difficulty of Paying Living Expenses:    Food Insecurity:     Worried About Running Out of Food in the Last Year:     [address] in the Last Year:    Transportation Needs:     Lack of Transportation (Medical):  Lack of Transportation (Non-Medical):    Physical Activity:     Days of Exercise per Week:     Minutes of Exercise per Session:    Stress:     Feeling of Stress :    Social Connections:     Frequency of Communication with Friends and Family:     Frequency of Social Gatherings with Friends and Family:     Attends [employer]ars[employer] Services:     Active Member of Clubs or Organizations:     Attends Club or Organization Meetings:     Marital Status:    Intimate Partner Violence:     Fear of Current or Ex-Partner:     Emotionally Abused:     Physically Abused:     Sexually Abused:        SCREENINGS             PHYSICAL EXAM    (up to 7 for level 4, 8 or more for level 5)     ED Triage Vitals [08/17/21 1537]   BP Temp Temp Source Pulse Resp SpO2 Height Weight   (!) 161/82 98.2 °F (36.8 °C) Tympanic 65 18 97 % -- --       Physical Exam  Vitals and nursing note reviewed. Constitutional:       General: She is not in acute distress. Appearance: She is well-developed. She is not diaphoretic. HENT:      Head: Normocephalic and atraumatic.       Right Ear: External ear normal.      Left Ear: External ear normal.   Eyes: General: No scleral icterus. Right eye: No discharge. Left eye: No discharge. Conjunctiva/sclera: Conjunctivae normal.   Neck:      Trachea: No tracheal deviation. Cardiovascular:      Rate and Rhythm: Normal rate and regular rhythm. Pulmonary:      Effort: Pulmonary effort is normal. No respiratory distress. Breath sounds: No stridor. Musculoskeletal:         General: Swelling and tenderness present. No deformity. Normal range of motion. Cervical back: Full passive range of motion without pain, normal range of motion and neck supple. No spinous process tenderness or muscular tenderness. Normal range of motion. Comments: Patient has bruising tenderness noted to the lateral malleolus of the left ankle. There is no proximal fibular tenderness. There is no calf tenderness. Patient's compartments are soft. No open wounds. Intact distal pulses sensation cap refills less than 2 seconds. Able to flex and extend at the ankle able to ambulate. No instability of the ankle. She has no tenderness of the foot specifically nothing to the lateral fifth metatarsal.   Skin:     General: Skin is warm and dry. Capillary Refill: Capillary refill takes less than 2 seconds. Findings: No erythema or rash. Neurological:      General: No focal deficit present. Mental Status: She is alert and oriented to person, place, and time. Cranial Nerves: No cranial nerve deficit. Motor: No abnormal muscle tone. Deep Tendon Reflexes: Reflexes are normal and symmetric.    Psychiatric:         Behavior: Behavior normal.         DIAGNOSTIC RESULTS     EKG: All EKG's are interpreted by the Emergency Department Physician who either signs orCo-signs this chart in the absence of a cardiologist.      RADIOLOGY:   Non-plainfilm images such as CT, Ultrasound and MRI are read by the radiologist. Plain radiographic images are visualized and preliminarily interpreted by the emergency physician with the below findings:      Interpretationper the Radiologist below, if available at the time of this note:    XR ANKLE LEFT (MIN 3 VIEWS)   Final Result   No acute fracture or dislocation. TIBIA/FIBULA FINDINGS:   No acute fracture. No dislocation. Partially visualized knee arthroplasty. IMPRESSION:   No acute fracture or dislocation. XR TIBIA FIBULA LEFT (2 VIEWS)   Final Result   No acute fracture or dislocation. TIBIA/FIBULA FINDINGS:   No acute fracture. No dislocation. Partially visualized knee arthroplasty. IMPRESSION:   No acute fracture or dislocation. ED BEDSIDE ULTRASOUND:   Performed by ED Physician - none    LABS:  Labs Reviewed - No data to display    All other labs were within normal range or not returned as of this dictation. EMERGENCY DEPARTMENT COURSE and DIFFERENTIAL DIAGNOSIS/MDM:   Vitals:    Vitals:    08/17/21 1537   BP: (!) 161/82   Pulse: 65   Resp: 18   Temp: 98.2 °F (36.8 °C)   TempSrc: Tympanic   SpO2: 97%         MDM  Nona Wells is a 68 y.o. female who presents to the emergency department s/p fall; we will order x-rays to rule out acute fracture, subluxation or other bony abnormality. Patient is on a blood thinner she did not strike her head. Does have some bruising. She is perfusing distally from the injury. There is some mild swelling. No fractures identified on x-ray films. She is up and ambulatory on this leg. Compartments of the leg are soft. At this point patient will be discharged home with orthopedic follow-up this week she also understands follow-up with primary care for recheck. She agrees with this plan all questions have been answered. She will otherwise return here with any new or worse complaints. Strict and specific return warnings were given. Procedures    FINAL IMPRESSION      1.  Sprain of ankle, unspecified laterality, unspecified ligament, initial encounter        DISPOSITION/PLAN   DISPOSITION Decision To Discharge 08/17/2021 04:23:36 PM      PATIENT REFERRED TO:  [facility] ED  [address]  [address]  [phone]    If symptoms worsen, As needed    Milagros Mccray MD  [address] [place] [address],[address]  [phone]    Schedule an appointment as soon as possible for a visit in 1 day      Natalie Scott, DO  [address]  [phone]            DISCHARGE MEDICATIONS:  New Prescriptions    No medications on file              Summation      Patient Course:      ED Medications administered this visit:  Medications - No data to display    New Prescriptions from this visit:    New Prescriptions    No medications on file       Follow-up:  [facility] ED  [address]  [address]  [phone]    If symptoms worsen, As needed    Milagros Mccray MD  [address] [place] [address],[facility] Floor  [phone]    Schedule an appointment as soon as possible for a visit in 1 day      Natalie Chavez, [address]  [phone]              Final Impression:   1.  Sprain of ankle, unspecified laterality, unspecified ligament, initial encounter               (Please note that portions of this note were completed with a voice recognition program.  Efforts were made to edit the dictations but occasionally words are mis-transcribed.)           Jt Caldwell PA-C  08/17/21 2104

## 2021-08-18 ENCOUNTER — HOSPITAL ENCOUNTER (OUTPATIENT)
Dept: CARDIAC REHAB | Age: 76
Setting detail: THERAPIES SERIES
End: 2021-08-18

## 2021-08-19 ENCOUNTER — APPOINTMENT (OUTPATIENT)
Dept: CARDIAC REHAB | Age: 76
End: 2021-08-19

## 2021-08-23 ENCOUNTER — APPOINTMENT (OUTPATIENT)
Dept: CARDIAC REHAB | Age: 76
End: 2021-08-23

## 2021-08-25 ENCOUNTER — HOSPITAL ENCOUNTER (OUTPATIENT)
Dept: CARDIAC REHAB | Age: 76
Setting detail: THERAPIES SERIES
End: 2021-08-25

## 2021-08-26 ENCOUNTER — APPOINTMENT (OUTPATIENT)
Dept: CARDIAC REHAB | Age: 76
End: 2021-08-26

## 2021-08-30 ENCOUNTER — HOSPITAL ENCOUNTER (OUTPATIENT)
Dept: CARDIAC REHAB | Age: 76
Setting detail: THERAPIES SERIES
Discharge: HOME | End: 2021-08-30

## 2021-08-30 PROCEDURE — 93798 PHYS/QHP OP CAR RHAB W/ECG: CPT

## 2021-08-30 NOTE — PROGRESS NOTES
Phase II Cardiac Rehab Individualized Treatment Plan-Initial     Patient Name: Ed Antony  Date of Initial Assessment: 8/30/2021  ACCOUNT #: [de-identified]  Diagnosis: NSTEMI   Onset Date: 7/13/21  Referring Physician: Dick Anderson  Risk Stratification: mod  Session Number: 1   EXERCISE    Stages of Change:   [] pre-contemplation  [x] Action   [] Contemplate    [] Maintainence   [x] Prep   [] Relapse          Exercise Prescription:  Mode: [x] TM [x] B [x] STP  [x] R  Frequency: 3 x week  Duration: 31-60 minutes  Intensity: 2.1 mets  Progression: Increase 1-2 levels/week or 1-2 min/week to achieve target HR and Viki RPE scale 12-16.      [] Angina with Exertion THR:    [] Resistance Training Weight (lbs):   Reps:     Hypertension:  [x] Yes  [] No  Resting BP: 118/76  Peak Exercise BP: 142/70  BP Meds: losartan    Intervention:  Home Exercise:  Type: walking  Duration: 30 minutes  Frequency: daily   [] Resistance Training    Education:   [x] Equipment [place]  [x] Self pulse   [] Proper use weights/therabands   [x] S/S to report  [x] Low Na Diet    [x] Warm up/ Cool down  [] BP Medication    [x] RPE Scale   [] Understand BP   [x] Ex Safety   [] Exercise specialist class-Home Exercise       Target Goal:   -Individual Exercise Plan  -BP<140/90 or <130/80 if DM   -Aerobic active 30 + minutes 5-7 days per week    Nutrition    Stages of Change:   [] pre-contemplation  [x] Action   [] Contemplate    [] Maintainence   [x] Prep    [] Relapse    Lipids:  (2/26/21)  Total Cholesterol: 154  Triglycerides: 120  HDL: 49  LDL: 83  Lipid Meds: crestor     Diabetes:  [] Yes  [x] No  FBS:   HbA1c: 6.1 (2/26/21)  Diabetes Medication:  [] Monitor BS at home   Frequency?:     Weight Management:  Last Weight: 202.9 lb  Height: 5 ft  BMI: 39.1  Wt Goal: 1-2 lbs/wk  Alcohol:   Social History     Substance and Sexual Activity   Alcohol Use Not Currently     Diet Assessment Tool: Food Diary  Special Diet: heart healthy    Intervention:   [] Dietitian Consult       [x] Nurse/Patient Discussion     [] Diet Class           [] Referred to Diabetes Education     Education:  [] S&S hypo/hyperglycemia  [x] Low fat/low cholesterol diet  [] Weight loss methods      [] Relate Diabetes/CAD     [x] Eating heart healthy handout    Target Goal:  -LDL-C<100 if triglycerides are > 200  -LDL-C < 70 for high risk patients  -HbA1c < 7%  -BMI < 25   Education    Stages of Change:    [] pre-contemplation  [x] Action   [] Contemplate    [] Maintainence   [x] Prep    [] Relapse    Learning Barriers:   [] Speech   [] Cognitive   [] Literacy   [] Visions   [] Hearing    [x] Ready Learn    Knowledge test score: 100%      Family support: [x] Yes  [] No    Tobacco use:   Social History     Tobacco Use   Smoking Status Never Smoker   Smokeless Tobacco Never Used       Intervention:  [] Referred to smoking cessation counselor     [] Individual education and counseling  [] Tobacco adjunct  [] Informed of education class schedule     Education:   [] Risk Factors/Modifications  [x] Psychological aspects  [] Angina    [] Medications  [x] CHF      [] Cardiac A&P    Target Goal:  -Complete cessation of tobacco use (if applicable)  -Continued risk factor modifications  -Recognizing signs/symptoms to report  -Proper use of meds    Psychosocial  Stages of Change:    [] pre-contemplation  [x] Action   [] Contemplate    [] Maintainence   [x] Prep    [] Relapse    Psychosocial Test:  Tool Used: Anahi Garcia Quality of Life  Score: 27.00  Depression screening score: 3/9    Intervention:   [] Psych Consult/  [x] Uses stress management skills    [] Physician Referral    [] Stress management class  Medications:  none    Education:    [x] Coping Techniques   [x] Relaxation techniques   [x] S/S of Depression    Target Goal:  -Assess presence or absence of depression using a valid screening tool. -Maximize coping skills.  -Positive support system.     Preventative Medication:   [x] Aspirin       [x] Beta Blockade      [x] Statin or other lipid lowering agent     [] Clopidogrel   [] ACE Inhibitor   [x] Other anticoagulation medications     Fall Risk assess: [x] Yes  [] No  Assistive Device:   [] Cane  [] Walker [] Wheel Chair  [] Gait belt    Patient/Program goal:   -increased stamina/strength to 30-50 total exercise by increasing 1-2 level/wk and 1-2   min/wk  to achieve THR and RPE 11-13 Patient started program at 2.1 mets and will gradually increase.   -introduce weights/ therabands 2-4# for 5-10 reps Staff will instruct patient on these exercises at future session.  -manage BP better Blood pressure well controlled with appropriate BP response during exercise.  -improved cholesterol and  Triglycerides Last results are from 2/26/21 and show room for improvement. Patient is on statin therapy and has been given heart healthy diet info. Staff will schedule 1:1 with dietician.  -develop regular exercise 30 min daily Patient is not currently exercising at home. She is encouraged to initiate a home exercise program of walking with an eventual goal to reach 30 minutes a day walking as tolerated.     Physician Changes/Comments:      Cardiac Rehab Staff

## 2021-08-30 NOTE — PROGRESS NOTES
Cardiac Rehabilitation   Physician Order Form    Ivan Reis  1945    [x] Phase 2 ECG Monitored Cardiac Rehabilitation    [x] MI   [] PTCA with/without stents  [] CABG  [] Heart Valve Repair/Replaced   [] Stable Angina [] Other:     Onset Date: 7/13/21                    Cardiac Education Goals: (see individualized treatment plan for specific goals, progression & compliance)    [x] Hypertension [x] Physical Inactivity  [x] A & P  [] Smoking  [x] Coping/Depression [x] Medications  [x] Diabetes  [x] Obesity   [x] Angina  [x] Hyperlipidemia [x] Stress   [x] Home Exercise                     Prescribed Exercise Plan:    Target HR:     Duration: 31-60 minutes  Frequency: 3 x week  Initial Met Level:   Limitations: none    Modalities:  [x]Treadmill   [x] Recumb. Stepper/bike  [] Elliptical  [x] Weights/therabands    · Aerobic exercise to total 31-60 minutes. Progressing by 1-2 minutes per week and/or 1-2 levels per week per patient tolerance using various modalities; according to Viki RPE Scale 12-16 and THR  · Strength training starting with weights 1-3 # / 5-8 reps progressing to 5-10 # / 15-20 reps; therabands red-gray 5-20 reps. Per patient symptoms use:  · Appropriate ACLS Algorhythm for Cardiac Events. · Nitroglycerine 0.4mg SLq 5mins X 3 for angina pain. · 12 lead EKG for c/o chest pain or change in rhythm. · Nasal O2 for SaO2 <90% or symptoms warranted. · Blood sugar monitoring for Hyper/Hypoglycemia symptoms.

## 2021-09-01 ENCOUNTER — HOSPITAL ENCOUNTER (OUTPATIENT)
Dept: CARDIAC REHAB | Age: 76
Setting detail: THERAPIES SERIES
Discharge: HOME | End: 2021-09-01
Payer: MEDICARE

## 2021-09-01 PROCEDURE — 93798 PHYS/QHP OP CAR RHAB W/ECG: CPT

## 2021-09-02 ENCOUNTER — HOSPITAL ENCOUNTER (OUTPATIENT)
Dept: CARDIAC REHAB | Age: 76
Setting detail: THERAPIES SERIES
Discharge: HOME | End: 2021-09-02
Payer: MEDICARE

## 2021-09-02 PROCEDURE — 93798 PHYS/QHP OP CAR RHAB W/ECG: CPT

## 2021-09-02 NOTE — PROGRESS NOTES
Cardiopulmonary Rehab   Medical Nutrition Therapy Food Diary Evaluation    Patient Name: Lori Marquez  Registered Dietitian:  Dimitrios Hawkins, RD, LD, RDN, LD  Date:  9/2/2021    Dear Sammy Avelar,    Thank you for sharing your information about your eating patterns, it serves not only to help me see what you are eating, but you as well. Eating 3 meals a day is great way to start, I am pleased to see that you are doing that. Whole grains, fruits and vegetables, along with lean meats and low fat dairy are the cornerstones of your health. I did not notice many sources of whole grains. It was good to see cheerio's on your food diary. Consider adding brown rice, whole grain pasta, and whole grain breads to your meals. Furthermore, I did not notice any fruits with meals or snacks. Aim to have 3 servings every day, add a banana, raspberries, or blueberries to your cereal at breakfast. It is good to see when you eat at home you are having vegetables at your meals, but also do that when you dine out. You are making some higher fat/higher sodium choices such as the 4 meat pizza, cheeseburger, and fries with chicken nuggets. Choose vegetable pizza with 1 meat such as chicken and limit to no more than 3 pieces and have a side salad with low fat dressing. If having fast food consider plain grilled hamburger or chicken with lettuce and tomato, add side salad or if at [employer] consider having 1/2 of the baked potato and take the other half home. I noticed you did not eat the bun, it is fine to have the bun, it provides a source of carbohydrate, which in turn our bodies use for energy. Excess carbohydrate/starchy foods are not healthy, but part of a balanced meal plan it is alright to have a bun. With that in mind, look below for some suggestions. Your Rate Your Plate (RYP) Score was: 50, which means:  There are some ways you can make your eating habits healthier    Recommendations from the Dietitian based on your RYP and Food Diary / activity record to improve health and decrease risk factors    1. Increase whole grains: whole grain bread, oatmeal, brown rice, whole wheat pasta. 2. Include 2 servings of non starchy vegetables every day (broccoli, green beans, carrots, radishes, etc.)  3. Strive for 3 servings of fruit every day (canned in light or own juice, fresh, or frozen)  4. Choose lower fat/lower sodium options when dining out such as grilled chicken sandwich with lettuce and tomato, baked potato or side salad, vegetable pizza with chicken. 5. Include a minimum of 30 minutes of physical activity, 5 days each week. If there are many things to change, try making change with one recommendation, then move on from there. Recommendations are based on guidelines from the American Heart Association, American Diabetes Association and current literature.     Feel free to call with questions or concerns [phone] or [phone]          [facility], JUAN CARLOS, LD RORY, LD

## 2021-09-06 ENCOUNTER — HOSPITAL ENCOUNTER (OUTPATIENT)
Dept: CARDIAC REHAB | Age: 76
Setting detail: THERAPIES SERIES
End: 2021-09-06
Payer: MEDICARE

## 2021-09-08 ENCOUNTER — HOSPITAL ENCOUNTER (OUTPATIENT)
Dept: CARDIAC REHAB | Age: 76
Setting detail: THERAPIES SERIES
Discharge: HOME | End: 2021-09-08
Payer: MEDICARE

## 2021-09-08 PROCEDURE — 93798 PHYS/QHP OP CAR RHAB W/ECG: CPT

## 2021-09-09 ENCOUNTER — HOSPITAL ENCOUNTER (OUTPATIENT)
Dept: CARDIAC REHAB | Age: 76
Setting detail: THERAPIES SERIES
Discharge: HOME | End: 2021-09-09
Payer: MEDICARE

## 2021-09-09 PROCEDURE — 93798 PHYS/QHP OP CAR RHAB W/ECG: CPT

## 2021-09-13 ENCOUNTER — HOSPITAL ENCOUNTER (OUTPATIENT)
Dept: CARDIAC REHAB | Age: 76
Setting detail: THERAPIES SERIES
Discharge: HOME | End: 2021-09-13
Payer: MEDICARE

## 2021-09-13 PROCEDURE — 93798 PHYS/QHP OP CAR RHAB W/ECG: CPT

## 2021-09-15 ENCOUNTER — HOSPITAL ENCOUNTER (OUTPATIENT)
Dept: CARDIAC REHAB | Age: 76
Setting detail: THERAPIES SERIES
Discharge: HOME | End: 2021-09-15
Payer: MEDICARE

## 2021-09-15 PROCEDURE — 93798 PHYS/QHP OP CAR RHAB W/ECG: CPT

## 2021-09-16 ENCOUNTER — HOSPITAL ENCOUNTER (OUTPATIENT)
Dept: CARDIAC REHAB | Age: 76
Setting detail: THERAPIES SERIES
Discharge: HOME | End: 2021-09-16
Payer: MEDICARE

## 2021-09-16 PROCEDURE — 93798 PHYS/QHP OP CAR RHAB W/ECG: CPT

## 2021-09-20 ENCOUNTER — OFFICE VISIT (OUTPATIENT)
Dept: CARDIOLOGY | Age: 76
End: 2021-09-20
Payer: MEDICARE

## 2021-09-20 ENCOUNTER — HOSPITAL ENCOUNTER (OUTPATIENT)
Dept: CARDIAC REHAB | Age: 76
Setting detail: THERAPIES SERIES
End: 2021-09-20
Payer: MEDICARE

## 2021-09-20 VITALS
OXYGEN SATURATION: 96 % | DIASTOLIC BLOOD PRESSURE: 78 MMHG | SYSTOLIC BLOOD PRESSURE: 133 MMHG | BODY MASS INDEX: 38.87 KG/M2 | HEIGHT: 60 IN | WEIGHT: 198 LBS | RESPIRATION RATE: 18 BRPM | HEART RATE: 62 BPM

## 2021-09-20 DIAGNOSIS — I48.0 PAROXYSMAL ATRIAL FIBRILLATION (HCC): ICD-10-CM

## 2021-09-20 DIAGNOSIS — I25.10 CORONARY ARTERY DISEASE INVOLVING NATIVE CORONARY ARTERY OF NATIVE HEART WITHOUT ANGINA PECTORIS: Primary | ICD-10-CM

## 2021-09-20 DIAGNOSIS — I21.4 NON-ST ELEVATION MYOCARDIAL INFARCTION (NSTEMI) (HCC): ICD-10-CM

## 2021-09-20 DIAGNOSIS — I10 ESSENTIAL HYPERTENSION: ICD-10-CM

## 2021-09-20 DIAGNOSIS — E78.2 MIXED HYPERLIPIDEMIA: ICD-10-CM

## 2021-09-20 DIAGNOSIS — Z79.01 ENCOUNTER FOR CURRENT LONG-TERM USE OF ANTICOAGULANTS: ICD-10-CM

## 2021-09-20 PROCEDURE — 99213 OFFICE O/P EST LOW 20 MIN: CPT | Performed by: FAMILY MEDICINE

## 2021-09-20 PROCEDURE — G8417 CALC BMI ABV UP PARAM F/U: HCPCS | Performed by: FAMILY MEDICINE

## 2021-09-20 PROCEDURE — 1123F ACP DISCUSS/DSCN MKR DOCD: CPT | Performed by: FAMILY MEDICINE

## 2021-09-20 PROCEDURE — 4040F PNEUMOC VAC/ADMIN/RCVD: CPT | Performed by: FAMILY MEDICINE

## 2021-09-20 PROCEDURE — 1036F TOBACCO NON-USER: CPT | Performed by: FAMILY MEDICINE

## 2021-09-20 PROCEDURE — G8427 DOCREV CUR MEDS BY ELIG CLIN: HCPCS | Performed by: FAMILY MEDICINE

## 2021-09-20 PROCEDURE — G8400 PT W/DXA NO RESULTS DOC: HCPCS | Performed by: FAMILY MEDICINE

## 2021-09-20 PROCEDURE — 1090F PRES/ABSN URINE INCON ASSESS: CPT | Performed by: FAMILY MEDICINE

## 2021-09-20 PROCEDURE — 99211 OFF/OP EST MAY X REQ PHY/QHP: CPT | Performed by: FAMILY MEDICINE

## 2021-09-20 NOTE — PATIENT INSTRUCTIONS
SURVEY:    You may be receiving a survey from [employer] regarding your visit today. Please complete the survey to enable us to provide the highest quality of care to you and your family. If you cannot score us a very good on any question, please call the office to discuss how we could have made your experience a very good one. Thank you.

## 2021-09-22 ENCOUNTER — HOSPITAL ENCOUNTER (OUTPATIENT)
Dept: CARDIAC REHAB | Age: 76
Setting detail: THERAPIES SERIES
Discharge: HOME | End: 2021-09-22
Payer: MEDICARE

## 2021-09-22 PROCEDURE — 93798 PHYS/QHP OP CAR RHAB W/ECG: CPT

## 2021-09-23 ENCOUNTER — HOSPITAL ENCOUNTER (OUTPATIENT)
Dept: CARDIAC REHAB | Age: 76
Setting detail: THERAPIES SERIES
Discharge: HOME | End: 2021-09-23
Payer: MEDICARE

## 2021-09-23 PROCEDURE — 93798 PHYS/QHP OP CAR RHAB W/ECG: CPT

## 2021-09-27 ENCOUNTER — HOSPITAL ENCOUNTER (OUTPATIENT)
Dept: CARDIAC REHAB | Age: 76
Setting detail: THERAPIES SERIES
Discharge: HOME | End: 2021-09-27
Payer: MEDICARE

## 2021-09-27 PROCEDURE — 93798 PHYS/QHP OP CAR RHAB W/ECG: CPT

## 2021-09-28 NOTE — PROGRESS NOTES
Phase II Cardiac Rehab Individualized Treatment Plan - 30 Day    Patient Name: Monique Hall  Date of Initial Assessment: 9/28/2021  ACCOUNT #: [de-identified]  Diagnosis: NSTEMI   Onset Date: 7/13/2021  Referring Physician: Dr. Annelle Baumgarten  Risk Stratification: High  Session Number: 11   EXERCISE    Stages of Change:   [] pre-contemplation   [x] Action   [] Contemplate    [] Maintainence   [] Prep   [] Relapse          Exercise Prescription:  Mode: [x] TM [x] B [x] STP  [x] R   [x] UBE   [x] EL  Frequency: 3 days per week  Duration: 31-60 minutes  Intensity: 3.0-3.5 METs                        THRR:  (Rest + 20-30 bpm)  Progression:   Based on risk stratification, may increase duration per F.I.T.T. protocol parameters on average 5-10 minutes every 1-2 weeks for the first 4-6 weeks. After 3-4 weeks completed, continue to gradually increase F.I.T.T. parameters gradually at the established duration on average 0.5-1.0 METs per 30 days over the course of the remaining program, as established by patient centered goals and guidelines, maintaining RPE 12-16. [] Angina with Exertion    [x] Resistance Training  8-12 bilateral upper extremity resistance exercise at 1-3 sets per lift, on 2-3 non-consecutive days using TheraBand/Weights to 8-15 reps on at lease 2 occasions, maintaining RPE 12-16. Once repetition maximum has been achieved, additional weight sets may be added. Hypertension:  [x] Yes  [] No  Resting BP: 144/68  Peak Exercise BP: 154/76  BP Meds: Metoprolol, Cozaar    Intervention:  Home Exercise:  Type: Walking, cycling  Duration: 30-60 minutes  Frequency: At least 2 non-rehab days per week     [x] Resistance Training  Progression:  Beginning session # 13, may Introdue 8-12 bilateral upper extremity resistance exercise at 1-3 sets per lift, on 2 non-consecutive days using TheraBand/Weights to 8-15 reps on at lease 2 occasions.   Once repetition maximum has been achieved, additional weight sets may be added.    Education:   [x] Equipment Cascade  [] Proper use weights/TheraBands   [x] S/S to report  [] Low Na Diet    [x] Warm up/ Cool down  [] BP Medication    [x] RPE Scale   [] Understand BP   [x] Ex Safety   [x] Home Exercise         Target Goal:   -Individual Exercise Plan  -BP<140/90 or <130/80 if DM   -Aerobic active 30 + minutes 5-7 days per week    Nutrition    Stages of Change:   [] pre-contemplation  [x] Action   [] Contemplate    [] Maintainence   [] Prep    [] Relapse    Lipids:   Repeated - Date:     [x] Not repeated  Total Cholesterol:  Triglycerides:   HDL:   LDL:   Lipid Meds: Crestor    Diabetes:  [] Yes  [x] No  FBS:   HbA1c:  Diabetes Medication:  [] Monitor BS at home   Frequency?:     Weight Management:  Weight: 198.5#  Height: 60 inches  BMI: 38.6  Wt Goal: BMI <25  Alcohol:   Social History     Substance and Sexual Activity   Alcohol Use Not Currently     Diet Assessment Tool:   [x]  Food Diary  Special Diet:  1500 Kcal/day, 2 gram NA+, 30% total fat, <10% saturated fat, 25-35 grams fiber, reduced cholesterol, balanced nutrition    Intervention:   [x] Dietitian Consult 9/16/2021      [] Nurse/Patient Discussion     [] Diabetes           [] Referred to Diabetes Education     Education:  [] S&S hypo/hyperglycemia  [] Low fat/low cholesterol diet  [] Weight loss       [] Relate Diabetes/CAD     [] Eating heart healthy handout    Target Goal:  -LDL-C<100 if triglycerides are > 200  -LDL-C < 70 for high risk patients  -HbA1c < 7%  -BMI < 25   Education    Stages of Change:    [] pre-contemplation   [x] Action   [] Contemplate    [] Maintainence   [] Prep    [] Relapse    Learning Barriers:   [] Speech   [] Cognitive   [] Literacy   [] Vision   [] Hearing    [] Readiness to Learn    Family support: [] Yes  [] No    Tobacco use:   Social History     Tobacco Use   Smoking Status Never Smoker   Smokeless Tobacco Never Used       Intervention:  [] Referred to physician for smoking cessation    [] Individual education and counseling  [] Tobacco adjunct      Education:   [x] Risk Factors   [x] Psychological aspects  [] Angina    [] Medications  [] CHF      [] Cardiac A&P    Target Goal:  -Complete cessation of tobacco use (if applicable)  -Continued risk factor modifications  -Recognizing signs/symptoms to report  -Proper use of meds    Psychosocial  Stages of Change:    [] pre-contemplation   [x] Action   [] Contemplate    [] Maintainence   [] Prep    [] Relapse    Psychosocial Test:  Tool Used: Anahi Garcia Quality of Life/PHQ9    Intervention:   [] Psych Consult/   [] Uses stress management skills    [] Physician Referral     Medications:     Education:    [] Stress Management   [x] Depression    Target Goal:  -Assess presence or absence of depression using a valid screening tool. -Maximize coping skills.  -Positive support system. Preventative Medication:   [x] Aspirin       [x] Beta Blockade      [x] Statin or other lipid lowering agent     [] Clopidogrel   [x] ACE Inhibitor   [x] Other anticoagulation medications: Xarelto    Fall Risk assess: [x] Yes  [] No  Low Risk  Assistive Device:    [] Cane  [] Walker [] Wheel Chair  [] Gait belt    [] NA    Patient Goals: Reduce weight/prevent another cardiac event    Program goal:    Increase stamina/strength to 30-50 total exercise minutes exercise 3-5 times per week by increasing 1-2 level/week and 1-5 minutes per week to achieve THR and RPE. Increase average aerobic functional capacity by at least 0.5-1.0 METs within THRRin the next 30 days.     Introduce strength training exercises after week 4    Decrease weight 2-5 pounds over next 30 days    Complete abstinence from tobacco use       Maintain optimal BP    Improved lipids       Reduce self-reported psycho-social feelings of stress and/or depression in the next 30 days    Eat at least 4-5 servings of vegetable per day and at least 2 servings of fruit    Evaluation:   Cassy Dennis has participated in 11 sessions of cardiac rehab thus far. Her attendance has been regular. She has gradually increased her workloads to an average 2. 5MET level for a total of 45 minutes. She has attended a dietician consultation and decreased her weight by 4.5# since beginning rehab. She scored well on the PHQ9 and QOL surveys on admission, and demonstrates no need for psychosocial intervention at this time. She participates in all education classes offered. Eneida Jones is progressing very well. Physician Changes/Comments:      ELIJAH Caldwell RN  Cardiac Rehab Staff

## 2021-09-29 ENCOUNTER — HOSPITAL ENCOUNTER (OUTPATIENT)
Dept: CARDIAC REHAB | Age: 76
Setting detail: THERAPIES SERIES
Discharge: HOME | End: 2021-09-29
Payer: MEDICARE

## 2021-09-29 PROCEDURE — 93798 PHYS/QHP OP CAR RHAB W/ECG: CPT

## 2021-09-30 ENCOUNTER — HOSPITAL ENCOUNTER (OUTPATIENT)
Dept: CARDIAC REHAB | Age: 76
Setting detail: THERAPIES SERIES
Discharge: HOME | End: 2021-09-30
Payer: MEDICARE

## 2021-09-30 PROCEDURE — 93798 PHYS/QHP OP CAR RHAB W/ECG: CPT

## 2021-10-04 ENCOUNTER — HOSPITAL ENCOUNTER (OUTPATIENT)
Dept: CARDIAC REHAB | Age: 76
Setting detail: THERAPIES SERIES
Discharge: HOME | End: 2021-10-04
Payer: MEDICARE

## 2021-10-04 PROCEDURE — 93798 PHYS/QHP OP CAR RHAB W/ECG: CPT

## 2021-10-06 ENCOUNTER — HOSPITAL ENCOUNTER (OUTPATIENT)
Dept: CARDIAC REHAB | Age: 76
Setting detail: THERAPIES SERIES
Discharge: HOME | End: 2021-10-06
Payer: MEDICARE

## 2021-10-06 PROCEDURE — 93798 PHYS/QHP OP CAR RHAB W/ECG: CPT

## 2021-10-07 ENCOUNTER — HOSPITAL ENCOUNTER (OUTPATIENT)
Dept: CARDIAC REHAB | Age: 76
Setting detail: THERAPIES SERIES
Discharge: HOME | End: 2021-10-07
Payer: MEDICARE

## 2021-10-07 PROCEDURE — 93798 PHYS/QHP OP CAR RHAB W/ECG: CPT

## 2021-10-11 ENCOUNTER — HOSPITAL ENCOUNTER (OUTPATIENT)
Dept: CARDIAC REHAB | Age: 76
Setting detail: THERAPIES SERIES
Discharge: HOME | End: 2021-10-11
Payer: MEDICARE

## 2021-10-11 ENCOUNTER — HOSPITAL ENCOUNTER (OUTPATIENT)
Dept: NON INVASIVE DIAGNOSTICS | Age: 76
Discharge: HOME | End: 2021-10-11
Payer: MEDICARE

## 2021-10-11 ENCOUNTER — APPOINTMENT (OUTPATIENT)
Dept: GENERAL RADIOLOGY | Age: 76
End: 2021-10-11
Payer: MEDICARE

## 2021-10-11 ENCOUNTER — HOSPITAL ENCOUNTER (EMERGENCY)
Age: 76
Discharge: HOME | End: 2021-10-11
Payer: MEDICARE

## 2021-10-11 VITALS
OXYGEN SATURATION: 96 % | BODY MASS INDEX: 38.28 KG/M2 | SYSTOLIC BLOOD PRESSURE: 147 MMHG | HEART RATE: 87 BPM | WEIGHT: 195 LBS | DIASTOLIC BLOOD PRESSURE: 64 MMHG | RESPIRATION RATE: 21 BRPM | HEIGHT: 60 IN

## 2021-10-11 DIAGNOSIS — I49.9 IRREGULAR HEART RATE: Primary | ICD-10-CM

## 2021-10-11 LAB
ABSOLUTE EOS #: 0.23 K/UL (ref 0–0.44)
ABSOLUTE IMMATURE GRANULOCYTE: <0.03 K/UL (ref 0–0.3)
ABSOLUTE LYMPH #: 1.77 K/UL (ref 1.1–3.7)
ABSOLUTE MONO #: 0.62 K/UL (ref 0.1–1.2)
ANION GAP SERPL CALCULATED.3IONS-SCNC: 12 MMOL/L (ref 9–17)
BASOPHILS # BLD: 1 % (ref 0–2)
BASOPHILS ABSOLUTE: 0.05 K/UL (ref 0–0.2)
BNP INTERPRETATION: ABNORMAL
BUN BLDV-MCNC: 16 MG/DL (ref 8–23)
BUN/CREAT BLD: 27 (ref 9–20)
CALCIUM SERPL-MCNC: 9.4 MG/DL (ref 8.6–10.4)
CHLORIDE BLD-SCNC: 104 MMOL/L (ref 98–107)
CO2: 26 MMOL/L (ref 20–31)
CREAT SERPL-MCNC: 0.6 MG/DL (ref 0.5–0.9)
D-DIMER QUANTITATIVE: 0.31 MG/L FEU (ref 0–0.59)
DIFFERENTIAL TYPE: NORMAL
EKG ATRIAL RATE: 53 BPM
EKG ATRIAL RATE: 53 BPM
EKG ATRIAL RATE: 73 BPM
EKG P AXIS: 41 DEGREES
EKG P AXIS: 44 DEGREES
EKG P AXIS: 83 DEGREES
EKG P-R INTERVAL: 156 MS
EKG P-R INTERVAL: 160 MS
EKG P-R INTERVAL: 166 MS
EKG Q-T INTERVAL: 456 MS
EKG Q-T INTERVAL: 462 MS
EKG Q-T INTERVAL: 478 MS
EKG QRS DURATION: 92 MS
EKG QRS DURATION: 94 MS
EKG QRS DURATION: 96 MS
EKG QTC CALCULATION (BAZETT): 433 MS
EKG QTC CALCULATION (BAZETT): 448 MS
EKG QTC CALCULATION (BAZETT): 502 MS
EKG R AXIS: -18 DEGREES
EKG R AXIS: -20 DEGREES
EKG R AXIS: -23 DEGREES
EKG T AXIS: 13 DEGREES
EKG T AXIS: 17 DEGREES
EKG T AXIS: 19 DEGREES
EKG VENTRICULAR RATE: 53 BPM
EKG VENTRICULAR RATE: 53 BPM
EKG VENTRICULAR RATE: 73 BPM
EOSINOPHILS RELATIVE PERCENT: 4 % (ref 1–4)
GFR AFRICAN AMERICAN: >60 ML/MIN
GFR NON-AFRICAN AMERICAN: >60 ML/MIN
GFR SERPL CREATININE-BSD FRML MDRD: ABNORMAL ML/MIN/{1.73_M2}
GFR SERPL CREATININE-BSD FRML MDRD: ABNORMAL ML/MIN/{1.73_M2}
GLUCOSE BLD-MCNC: 101 MG/DL (ref 70–99)
HCT VFR BLD CALC: 42.7 % (ref 36.3–47.1)
HEMOGLOBIN: 14.3 G/DL (ref 11.9–15.1)
IMMATURE GRANULOCYTES: 0 %
INR BLD: 2.4
LYMPHOCYTES # BLD: 28 % (ref 24–43)
MCH RBC QN AUTO: 30.5 PG (ref 25.2–33.5)
MCHC RBC AUTO-ENTMCNC: 33.5 G/DL (ref 28.4–34.8)
MCV RBC AUTO: 91 FL (ref 82.6–102.9)
MONOCYTES # BLD: 10 % (ref 3–12)
NRBC AUTOMATED: 0 PER 100 WBC
PDW BLD-RTO: 13.2 % (ref 11.8–14.4)
PLATELET # BLD: 169 K/UL (ref 138–453)
PLATELET ESTIMATE: NORMAL
PMV BLD AUTO: 10.7 FL (ref 8.1–13.5)
POTASSIUM SERPL-SCNC: 3.8 MMOL/L (ref 3.7–5.3)
PRO-BNP: 1010 PG/ML
PROTHROMBIN TIME: 25.3 SEC (ref 11.5–14.2)
RBC # BLD: 4.69 M/UL (ref 3.95–5.11)
RBC # BLD: NORMAL 10*6/UL
SEG NEUTROPHILS: 57 % (ref 36–65)
SEGMENTED NEUTROPHILS ABSOLUTE COUNT: 3.69 K/UL (ref 1.5–8.1)
SODIUM BLD-SCNC: 142 MMOL/L (ref 135–144)
TROPONIN INTERP: ABNORMAL
TROPONIN INTERP: ABNORMAL
TROPONIN T: ABNORMAL NG/ML
TROPONIN T: ABNORMAL NG/ML
TROPONIN, HIGH SENSITIVITY: 21 NG/L (ref 0–14)
TROPONIN, HIGH SENSITIVITY: 22 NG/L (ref 0–14)
WBC # BLD: 6.4 K/UL (ref 3.5–11.3)
WBC # BLD: NORMAL 10*3/UL

## 2021-10-11 PROCEDURE — 93225 XTRNL ECG REC<48 HRS REC: CPT

## 2021-10-11 PROCEDURE — 84484 ASSAY OF TROPONIN QUANT: CPT

## 2021-10-11 PROCEDURE — 93010 ELECTROCARDIOGRAM REPORT: CPT | Performed by: INTERNAL MEDICINE

## 2021-10-11 PROCEDURE — 85025 COMPLETE CBC W/AUTO DIFF WBC: CPT

## 2021-10-11 PROCEDURE — 93226 XTRNL ECG REC<48 HR SCAN A/R: CPT

## 2021-10-11 PROCEDURE — 99283 EMERGENCY DEPT VISIT LOW MDM: CPT

## 2021-10-11 PROCEDURE — 93005 ELECTROCARDIOGRAM TRACING: CPT | Performed by: PHYSICIAN ASSISTANT

## 2021-10-11 PROCEDURE — 85379 FIBRIN DEGRADATION QUANT: CPT

## 2021-10-11 PROCEDURE — 71045 X-RAY EXAM CHEST 1 VIEW: CPT

## 2021-10-11 PROCEDURE — 36415 COLL VENOUS BLD VENIPUNCTURE: CPT

## 2021-10-11 PROCEDURE — 85610 PROTHROMBIN TIME: CPT

## 2021-10-11 PROCEDURE — 93798 PHYS/QHP OP CAR RHAB W/ECG: CPT

## 2021-10-11 PROCEDURE — 80048 BASIC METABOLIC PNL TOTAL CA: CPT

## 2021-10-11 PROCEDURE — 83880 ASSAY OF NATRIURETIC PEPTIDE: CPT

## 2021-10-11 PROCEDURE — 93005 ELECTROCARDIOGRAM TRACING: CPT

## 2021-10-11 PROCEDURE — 2580000003 HC RX 258: Performed by: PHYSICIAN ASSISTANT

## 2021-10-11 RX ORDER — SODIUM CHLORIDE 9 MG/ML
1000 INJECTION, SOLUTION INTRAVENOUS CONTINUOUS
Status: DISCONTINUED | OUTPATIENT
Start: 2021-10-11 | End: 2021-10-11 | Stop reason: HOSPADM

## 2021-10-11 RX ADMIN — SODIUM CHLORIDE 1000 ML: 9 INJECTION, SOLUTION INTRAVENOUS at 12:15

## 2021-10-11 ASSESSMENT — ENCOUNTER SYMPTOMS
NAUSEA: 0
ORTHOPNEA: 0
HEMOPTYSIS: 0
COUGH: 0
VOMITING: 0
SHORTNESS OF BREATH: 0
BACK PAIN: 0

## 2021-10-11 NOTE — ED PROVIDER NOTES
[facility] ED  eMERGENCY dEPARTMENT eNCOUnter      Pt Name: Marcy Gracia  MRN: [id number]  [place] 1945  Date of evaluation: 10/11/21      CHIEF COMPLAINT       Chief Complaint   Patient presents with    Other     Pt sent from cardiac rehab for abnormal rhythm. Pt reports she had MI in July and has been in rehab for 5 weeks         [facility] Jaclyn Ruggiero is a 68 y.o. female who presents complaining of irregular heart beat. The history is provided by the patient. Palpitations  Palpitations quality:  Fast  Onset quality:  Sudden  Duration:  1 minute  Timing:  Intermittent  Progression:  Waxing and waning  Chronicity:  New  Relieved by:  Nothing  Worsened by:  Nothing  Ineffective treatments:  None tried  Associated symptoms: no back pain, no chest pain, no chest pressure, no cough, no diaphoresis, no dizziness, no hemoptysis, no leg pain, no lower extremity edema, no malaise/fatigue, no nausea, no near-syncope, no numbness, no orthopnea, no PND, no shortness of breath, no syncope, no vomiting and no weakness    Risk factors: heart disease        REVIEW OF SYSTEMS       Review of Systems   Constitutional: Negative for diaphoresis and malaise/fatigue. Respiratory: Negative for cough, hemoptysis and shortness of breath. Cardiovascular: Positive for palpitations. Negative for chest pain, orthopnea, syncope, PND and near-syncope. Gastrointestinal: Negative for nausea and vomiting. Musculoskeletal: Negative for back pain. Neurological: Negative for dizziness, weakness and numbness. All other systems reviewed and are negative.       PAST MEDICAL HISTORY     Past Medical History:   Diagnosis Date    CAD (coronary artery disease)     Hyperlipidemia     Hypertension        SURGICAL HISTORY       Past Surgical History:   Procedure Laterality Date    HYSTERECTOMY      JOINT REPLACEMENT Bilateral        CURRENT MEDICATIONS       Discharge Medication List as of 10/11/2021  3:56 moist.   Eyes:      Pupils: Pupils are equal, round, and reactive to light. Cardiovascular:      Rate and Rhythm: Normal rate and regular rhythm. Pulses: Normal pulses. Heart sounds: Normal heart sounds. No murmur heard. Pulmonary:      Effort: Pulmonary effort is normal.      Breath sounds: Normal breath sounds. Abdominal:      General: Bowel sounds are normal.      Palpations: Abdomen is soft. Tenderness: There is no abdominal tenderness. Musculoskeletal:         General: Normal range of motion. Cervical back: Normal range of motion. Skin:     General: Skin is warm and dry. Capillary Refill: Capillary refill takes less than 2 seconds. Neurological:      Mental Status: She is alert and oriented to person, place, and time. MEDICAL DECISION MAKING:     She has had cardiac rehab and they checked her and they checked her vitals and they found her heart rate to be irregular and fast.  She has a history of MI earlier this year. She is asymptomatic she has no palpitations no chest pain no shortness of breath no diaphoresis and no nausea. She has normal sinus rhythm when she arrives on monitor. She took her aspirin this morning. I did speak with cardiology regarding this patient. Patient has remained in sinus bradycardia or sinus rhythm throughout she does have a history of atrial fibrillation. We will order a 24-hour Holter monitor for the patient she is to follow-up with the cardiologist in 1 to 2 days. If she is symptomatic has chest pain shortness of breath any worsening condition recommend she return to the emergency department. Patient verbalized understanding of discharge instructions she is agreeable to plan  DIAGNOSTIC RESULTS     EKG: All EKG's are interpreted by the Emergency Department Physician who either signs or Co-signs this chart in the absence of acardiologist.  EKG shows sinus bradycardia with moderate voltage criteria.   For LVH rate is 53 bpm QT calculated is 448. No ST elevation T wave inversion is noted  RADIOLOGY:[place] film, CT, MRI, and formal ultrasound images (except ED bedside ultrasound) are read by the radiologist and the images and interpretations are directly viewed by the emergency physician. No acute process noted on the chest x-ray      LABS:All lab results were reviewed by myself, and all abnormals are listed below. Labs Reviewed   BASIC METABOLIC PANEL W/ REFLEX TO MG FOR LOW K - Abnormal; Notable for the following components:       Result Value    Glucose 101 (*)     Bun/Cre Ratio 27 (*)     All other components within normal limits   TROPONIN - Abnormal; Notable for the following components:    Troponin, High Sensitivity 22 (*)     All other components within normal limits   BRAIN NATRIURETIC PEPTIDE - Abnormal; Notable for the following components:    Pro-BNP 1,010 (*)     All other components within normal limits   PROTIME-INR - Abnormal; Notable for the following components:    Protime 25.3 (*)     All other components within normal limits   TROPONIN - Abnormal; Notable for the following components:    Troponin, High Sensitivity 21 (*)     All other components within normal limits   CBC WITH AUTO DIFFERENTIAL   D-DIMER, QUANTITATIVE         EMERGENCY DEPARTMENT COURSE:   Vitals:    Vitals:    10/11/21 1445 10/11/21 1500 10/11/21 1515 10/11/21 1530   BP: (!) 149/72 (!) 150/69 (!) 149/80 (!) 147/64   Pulse: (!) 49 57 (!) 49 87   Resp: 15 12 18 21   SpO2: 96% 97% 96%    Weight:       Height:           The patient was given the following medications while in the emergency department:  Orders Placed This Encounter   Medications    DISCONTD: 0.9 % sodium chloride infusion       -------------------------      CRITICAL CARE:       CONSULTS:  None    PROCEDURES:  Procedures    FINAL IMPRESSION      1.  Irregular heart rate          DISPOSITION/PLAN   DISPOSITION Decision To Discharge 10/11/2021 03:55:46 PM      PATIENT REFERREDTO:  Stephanie Dover MD  [address]  [address] [facility]  [phone]    Schedule an appointment as soon as possible for a visit in 2 days      [facility] ED  [address] [facility]  [phone]    If symptoms worsen      DISCHARGEMEDICATIONS:  Discharge Medication List as of 10/11/2021  3:56 PM          (Please note that portions of this note were completed with a voice recognition program.  Efforts were made to edit thedictations but occasionally words are mis-transcribed.)    ONIEL Go PA-C  10/22/21 1042

## 2021-10-11 NOTE — ED NOTES
This nurse cartside for rounding. Pt denies any needs. Extra urine sent on this pt. Pt visitor remains cartside. Bed in lowest position.  Call light within reach     Milagros Welch RN  10/11/21 [phone]

## 2021-10-11 NOTE — ED NOTES
RT notified about holter monitor and will be down shortly.      Seferino Cervantes RN  10/11/21 3451

## 2021-10-13 ENCOUNTER — HOSPITAL ENCOUNTER (OUTPATIENT)
Dept: CARDIAC REHAB | Age: 76
Setting detail: THERAPIES SERIES
Discharge: HOME | End: 2021-10-13
Payer: MEDICARE

## 2021-10-13 PROCEDURE — 93798 PHYS/QHP OP CAR RHAB W/ECG: CPT

## 2021-10-14 ENCOUNTER — HOSPITAL ENCOUNTER (OUTPATIENT)
Dept: CARDIAC REHAB | Age: 76
Setting detail: THERAPIES SERIES
Discharge: HOME | End: 2021-10-14
Payer: MEDICARE

## 2021-10-14 PROCEDURE — 93798 PHYS/QHP OP CAR RHAB W/ECG: CPT

## 2021-10-15 ENCOUNTER — OFFICE VISIT (OUTPATIENT)
Dept: CARDIOLOGY | Age: 76
End: 2021-10-15
Payer: MEDICARE

## 2021-10-15 VITALS
DIASTOLIC BLOOD PRESSURE: 74 MMHG | WEIGHT: 198 LBS | HEART RATE: 58 BPM | RESPIRATION RATE: 18 BRPM | HEIGHT: 60 IN | SYSTOLIC BLOOD PRESSURE: 134 MMHG | BODY MASS INDEX: 38.87 KG/M2 | OXYGEN SATURATION: 96 %

## 2021-10-15 DIAGNOSIS — I10 ESSENTIAL HYPERTENSION: ICD-10-CM

## 2021-10-15 DIAGNOSIS — I48.0 PAROXYSMAL ATRIAL FIBRILLATION (HCC): ICD-10-CM

## 2021-10-15 DIAGNOSIS — I25.10 CORONARY ARTERY DISEASE INVOLVING NATIVE CORONARY ARTERY OF NATIVE HEART WITHOUT ANGINA PECTORIS: Primary | ICD-10-CM

## 2021-10-15 DIAGNOSIS — E78.2 MIXED HYPERLIPIDEMIA: ICD-10-CM

## 2021-10-15 DIAGNOSIS — I21.4 NON-ST ELEVATION MYOCARDIAL INFARCTION (NSTEMI) (HCC): ICD-10-CM

## 2021-10-15 LAB
ACQUISITION DURATION: NORMAL S
AVERAGE HEART RATE: 65 BPM
EKG DIAGNOSIS: NORMAL
HOLTER MAX HEART RATE: 114 BPM
HOOKUP DATE: NORMAL
HOOKUP TIME: NORMAL
MAX HEART RATE TIME/DATE: NORMAL
MIN HEART RATE TIME/DATE: NORMAL
MIN HEART RATE: 48 BPM
NUMBER OF QRS COMPLEXES: NORMAL
NUMBER OF SUPRAVENTRICULAR COUPLETS: 6
NUMBER OF SUPRAVENTRICULAR ECTOPICS: NORMAL
NUMBER OF SUPRAVENTRICULAR ISOLATED BEATS: 7869
NUMBER OF VENTRICULAR BIGEMINAL CYCLES: 2
NUMBER OF VENTRICULAR COUPLETS: 2
NUMBER OF VENTRICULAR ECTOPICS: 1558

## 2021-10-15 PROCEDURE — G8484 FLU IMMUNIZE NO ADMIN: HCPCS | Performed by: PHYSICIAN ASSISTANT

## 2021-10-15 PROCEDURE — G8400 PT W/DXA NO RESULTS DOC: HCPCS | Performed by: PHYSICIAN ASSISTANT

## 2021-10-15 PROCEDURE — 99211 OFF/OP EST MAY X REQ PHY/QHP: CPT | Performed by: PHYSICIAN ASSISTANT

## 2021-10-15 PROCEDURE — 1123F ACP DISCUSS/DSCN MKR DOCD: CPT | Performed by: PHYSICIAN ASSISTANT

## 2021-10-15 PROCEDURE — 1090F PRES/ABSN URINE INCON ASSESS: CPT | Performed by: PHYSICIAN ASSISTANT

## 2021-10-15 PROCEDURE — G8417 CALC BMI ABV UP PARAM F/U: HCPCS | Performed by: PHYSICIAN ASSISTANT

## 2021-10-15 PROCEDURE — 99214 OFFICE O/P EST MOD 30 MIN: CPT | Performed by: PHYSICIAN ASSISTANT

## 2021-10-15 PROCEDURE — 4040F PNEUMOC VAC/ADMIN/RCVD: CPT | Performed by: PHYSICIAN ASSISTANT

## 2021-10-15 PROCEDURE — G8427 DOCREV CUR MEDS BY ELIG CLIN: HCPCS | Performed by: PHYSICIAN ASSISTANT

## 2021-10-15 PROCEDURE — 1036F TOBACCO NON-USER: CPT | Performed by: PHYSICIAN ASSISTANT

## 2021-10-15 NOTE — PROGRESS NOTES
Martha Perales am scribing for and in the presence of Breana Lugo     Patient: Brenda Conner  : 1945  Date of Visit: October 15, 2021    REASON FOR VISIT / CONSULTATION: Follow-up (HX:CAd, NSTEMI, PAF, HTN, HLD PT ishere for ER follow up she states doing well since she had Holter monitor. This happened while at rehab denies feeling. Denies:CP,SOB,lightheaded/dizziness,palp)    History of Present Illness:        Dear Kristen Potts, DO    I had the pleasure of seeing Brenda Conner in my office today. Ms. Dusty Rodriges is a 68 y.o. female with a history of recently diagnosied atherosclerotic heart disease including a NSTEMI in 2021 and atrial fibrillation. She came into the ER on 2021 due to chest pains and heart palpitations. She was then told she was having a heart attack at that time and was sent to [facility] in [place]. She had no previous heart history prior to this episode. She does have two sisters who have a history of atrial fibrillation. She did not know she was in atrial fibrillation at all when she came to the ER. Her chest discomfort started like a pressure feeling and then she felt like a heat sensation in her face and her pain did radiate into her back at that time as well. While at [facility] in [place] she did have a heart cath done and the picture is scanned into media. She did not need any stents at that time however her medications were changed of course at that time. The cardiologist in [place] did tell her the heart was strong. She did have an Echo done in [place] however she was never told about her heart strength. She has been on Bumex for many years due to leg swelling in the past. EF 55-70% on 2021 echo. She had Holter monitor done on 10/11/2021 1. The rhythm was sinus. Average FL interval 0.16 average QRS duration 0.08. Intermittent atrial fibrillation for 6% (82  minutes) test duration. 2. No symptoms noted. Frequent PAC's and frequent PVC's.     Ms. Dusty Rodriges is here today for a ED follow up on 10/11/2021. She states while at cardiac rehab her heart rate was elevated and was sent to emergency room. She denies having any symptoms. She records her blood pressure and heart rate daily, records reviewed. All within normal limits. She denies any heart palpitations or lightheaded or dizziness. She also denied any chest pain now or increased shortness of breath, abdominal pain, bleeding problems, problems with her medications or any other concerns at this time. Bleeding Risks: Ms. Shaun Shone denies any current or recent bleeding problems including a history of a GI bleed, ulcers, recent or upcoming surgeries, blood in her stool or black tarry stools or blood in her urine. PAST MEDICAL HISTORY:         Past Medical History:   Diagnosis Date    CAD (coronary artery disease)     Hyperlipidemia     Hypertension        CURRENT ALLERGIES: Patient has no known allergies. REVIEW OF SYSTEMS: 14 systems were reviewed. Pertinent positives and negatives as above, all else negative.      Past Surgical History:   Procedure Laterality Date    HYSTERECTOMY      JOINT REPLACEMENT Bilateral     Social History:  Social History     Tobacco Use    Smoking status: Never Smoker    Smokeless tobacco: Never Used   Substance Use Topics    Alcohol use: Not Currently    Drug use: Never        CURRENT MEDICATIONS:        Outpatient Medications Marked as Taking for the 10/15/21 encounter (Office Visit) with Robb Ferrari PA-C   Medication Sig Dispense Refill    Artificial Tear Ointment (DRY EYES OP) Apply 2 drops to eye as needed      aspirin 81 MG EC tablet Take 81 mg by mouth daily      calcium carbonate (OSCAL) 500 MG TABS tablet Take 600 mg by mouth 2 times daily       Multiple Vitamins-Minerals (CENTRUM SILVER 50+WOMEN PO) Take by mouth      docusate sodium (COLACE) 100 MG capsule Take 100 mg by mouth 2 times daily      rosuvastatin (CRESTOR) 20 MG tablet Take 1 tablet by mouth daily 90 tablet 3    metoprolol succinate (TOPROL XL) 50 MG extended release tablet Take 1 tablet by mouth daily 90 tablet 3    XARELTO 20 MG TABS tablet Take 20 mg by mouth daily (with breakfast)       sotalol (BETAPACE) 80 MG tablet Take 80 mg by mouth 2 times daily       losartan (COZAAR) 50 MG tablet Take 50 mg by mouth daily      bumetanide (BUMEX) 1 MG tablet Take 1.5 mg by mouth daily      omeprazole (PRILOSEC) 20 MG delayed release capsule Take 40 mg by mouth daily         FAMILY HISTORY: family history includes Atrial Fibrillation in her sister and sister; Cancer in her sister; Diabetes in her brother; Heart Attack in her father; Heart Disease in her brother. Physical Examination:     /74 (Site: Right Upper Arm, Position: Sitting, Cuff Size: Large Adult)   Pulse 58   Resp 18   Ht 5' (1.524 m)   Wt 198 lb (89.8 kg)   SpO2 96%   BMI 38.67 kg/m²  Body mass index is 38.67 kg/m². Constitutional: She appeared oriented to person and place. She appears well-developed and well-nourished. In no acute distress. HEENT: Normocephalic and atraumatic. No JVD present. Carotid bruit is not present. No mass and no thyromegaly present. No lymphadenopathy noted. Cardiovascular: Normal rate, regular rhythm, normal heart sounds. Exam reveals no gallop and no friction rubs. 2/6 systolic murmur, 2nd intercostal space on the RIGHT just lateral to the sternum. Pulmonary/Chest: Effort normal and breath sounds normal. No respiratory distress. She has no wheezes, rhonchi or rales. Abdominal: Soft, non-tender. She exhibits no organomegaly, mass or bruit. Extremities: Trace. No cyanosis or clubbing. 2+ radial and carotid pulses. Distal extremity pulses: 2+ bilaterally. Neurological: Alertness and orientation as per Constitutional exam. No evidence of gross cranial nerve deficit. Coordination appeared normal.   Skin: Skin is warm and dry. There is no rash or diaphoresis. Psychiatric: She has a normal mood and affect. Her speech is normal and behavior is normal.      MOST RECENT LABS ON RECORD:   Lab Results   Component Value Date    WBC 6.4 10/11/2021    HGB 14.3 10/11/2021    HCT 42.7 10/11/2021     10/11/2021    ALT 42 (H) 07/13/2021    AST 29 07/13/2021     10/11/2021    K 3.8 10/11/2021     10/11/2021    CREATININE 0.60 10/11/2021    BUN 16 10/11/2021    CO2 26 10/11/2021    TSH 2.21 07/13/2021    INR 2.4 10/11/2021       ASSESSMENT:     1. Coronary artery disease involving native coronary artery of native heart without angina pectoris    2. Non-ST elevation myocardial infarction (NSTEMI) ([facility])    3. Paroxysmal atrial fibrillation ([facility] [address]    4. Essential hypertension    5. Mixed hyperlipidemia       PLAN:         Atherosclerotic Heart Disease: Recent NSTEMI as outlined above. Also S/P Heart Cath done on 7/14/2021 and no stents were needed at that time. Currently Stable at this time.  Antiplatelet Agent: Continue Aspirin 81 mg daily.  Beta Blocker: Continue Metoprolol succinate (Toprol XL) 50 mg daily.  Cholesterol Reduction Therapy: Continue rosuvastatin (Crestor) 20 mg daily.  Additional counseling: I advised them to call our office or go to the emergency room if they developed worsening or persistent chest pain or increased shortness of breath as this could be life threatening. · Paroxysmal Atrial Fibrillation: Rhythm Control Asymptomatic. We did discuss in great detail the etiology of her atrial fibrillation and her new diagnosis of atrial fibrillation.  Beta Blocker: Continue Metoprolol succinate (Toprol XL) 50 mg daily.  Anti-Arrhythmic: sotalol (Betapace) 80 mg every 12 hours.: Monitoring: Since he will being maintained on dronedarone, I told them that we will need to closely monitor them for potential side effects. These include re-assessment of their ECG, LFTs and renal function.   [id number] Score for Atrial Fibrillation Stroke Risk   Risk   Factors  Component Value   C CHF No 0   H HTN Yes 1   A2 Age >= 76 Yes,  (77 y.o.) 2   D DM No 0   S2 Prior Stroke/TIA No 0   V Vascular Disease Yes 1   A Age 74-69 No,  (77 y.o.) 0   Sc Sex female 1    [id number]  Score  5   Score last updated 7/6/96 99:17 AM EDT  Click here for a link to the UpToDate guideline \"Atrial Fibrillation: Anticoagulation therapy to prevent embolization  Disclaimer: Risk Score calculation is dependent on accuracy of patient problem list and past encounter diagnosis.  Stroke Risk: CHADS2-VASc Score: 4/9 (4% stroke risk)   Anticoagulation: Continue Riveroxaban (Xarelto): 20 mg once daily with largest meal (typically dinner).  Additional Testing: I Ordered an Echocardiogram to assess Ms. Rivera's ejection fraction and to look for significant valvular heart disease as a source of Ms. Rivera symptoms     Possible a Weak Heart: EF 55-60% on echo done on 7/14/2021   Beta Blocker: Continue Metoprolol succinate (Toprol XL) 50 mg daily.  ACE Inibitor/ARB: Continue losartan (Cozaar) 50 mg daily.  Diuretics: Continue bumetinide (Bumex) 1.5 mg every morning. I also discussed the potential side effects of this medication including lightheadedness and dizziness and instructed them to stop the medication of this occurs and call our office if this occurs. Finally, I recommended that she continue her current medications and follow up with you as previously scheduled. FOLLOW UP:   I told Ms. Eugenia Henriquez to call my office if she had any problems, but otherwise I asked her to Return in about 5 months (around 3/15/2022). However, I would be happy to see her sooner should the need arise. Sincerely,  Breana Hilliard, [id number] [facility] Cardiology Specialist    [address], [place], [address] [place] [address]  Phone: [phone], Fax: [phone]     I believe that the risk of significant morbidity and mortality related to the patient's current medical conditions are: Intermediate.        The documentation recorded by the scribe, accurately and completely reflects the services I personally performed and the decisions made by me.  Beth Hawkins PA-C      October 15, 2021

## 2021-10-15 NOTE — PATIENT INSTRUCTIONS
SURVEY:    You may be receiving a survey from [employer] regarding your visit today. Please complete the survey to enable us to provide the highest quality of care to you and your family. If you cannot score us a very good on any question, please call the office to discuss how we could have made your experience a very good one. Thank you.

## 2021-10-18 ENCOUNTER — HOSPITAL ENCOUNTER (OUTPATIENT)
Dept: CARDIAC REHAB | Age: 76
Setting detail: THERAPIES SERIES
Discharge: HOME | End: 2021-10-18
Payer: MEDICARE

## 2021-10-18 PROCEDURE — 93798 PHYS/QHP OP CAR RHAB W/ECG: CPT

## 2021-10-19 ENCOUNTER — TELEPHONE (OUTPATIENT)
Dept: CARDIOLOGY | Age: 76
End: 2021-10-19

## 2021-10-19 DIAGNOSIS — I21.4 NON-ST ELEVATION MYOCARDIAL INFARCTION (NSTEMI) (HCC): ICD-10-CM

## 2021-10-19 DIAGNOSIS — I21.4 NON-ST ELEVATION MYOCARDIAL INFARCTION (NSTEMI) (HCC): Primary | ICD-10-CM

## 2021-10-19 DIAGNOSIS — I25.10 CORONARY ARTERY DISEASE INVOLVING NATIVE CORONARY ARTERY OF NATIVE HEART WITHOUT ANGINA PECTORIS: ICD-10-CM

## 2021-10-19 DIAGNOSIS — I48.0 PAROXYSMAL ATRIAL FIBRILLATION (HCC): ICD-10-CM

## 2021-10-19 DIAGNOSIS — I10 ESSENTIAL HYPERTENSION: ICD-10-CM

## 2021-10-19 DIAGNOSIS — E78.2 MIXED HYPERLIPIDEMIA: ICD-10-CM

## 2021-10-20 ENCOUNTER — HOSPITAL ENCOUNTER (OUTPATIENT)
Dept: CARDIAC REHAB | Age: 76
Setting detail: THERAPIES SERIES
End: 2021-10-20
Payer: MEDICARE

## 2021-10-21 ENCOUNTER — HOSPITAL ENCOUNTER (OUTPATIENT)
Dept: CARDIAC REHAB | Age: 76
Setting detail: THERAPIES SERIES
Discharge: HOME | End: 2021-10-21
Payer: MEDICARE

## 2021-10-21 PROCEDURE — 93798 PHYS/QHP OP CAR RHAB W/ECG: CPT

## 2021-10-25 ENCOUNTER — HOSPITAL ENCOUNTER (OUTPATIENT)
Dept: CARDIAC REHAB | Age: 76
Setting detail: THERAPIES SERIES
Discharge: HOME | End: 2021-10-25
Payer: MEDICARE

## 2021-10-25 PROCEDURE — 93798 PHYS/QHP OP CAR RHAB W/ECG: CPT

## 2021-10-26 NOTE — PROGRESS NOTES
Phase II Cardiac Rehab Individualized Treatment Plan -  61 Day    Patient Name: Andra Cornell  Date of Assessment: 10/26/2021  ACCOUNT #: [de-identified]  Diagnosis: NSTEMI  Onset Date: 07/12/2021  Referring Physician: Dr. Bryan Tang Stratification: Moderate  Session Number: 20  EXERCISE    Stages of Change:   [] pre-contemplation  [x] Action   [] Contemplate    [] Maintainence   [] Prep   [] Relapse          Exercise Prescription:  Mode: [x] TM [x] B [x] STP  [] R   [x] UBE   [x] EL  Frequency: 3 days per week  Duration: 31-60 minutes  Intensity: 3.5-4.0 METS                        THRR: 89-99bmp  Progression:   Based on risk stratification, may increase duration per F.I.T.T. protocol parameters on average 5-10 minutes every 1-2 weeks for the first 4-6 weeks. After 3-4 weeks completed, continue to gradually increase F.I.T.T. parameters gradually at the established duration on average 0.5-1.0 METs per 30 days over the course of the remaining program, as established by patient centered goals and guidelines, maintaining RPE 12-16. [] Angina with Exertion    [x] Resistance Training  Introduce 8-15 bilateral upper extremity resistance exercise at 1-3 sets per lift, on 2-3 non-consecutive days using TheraBand/Weights to 8-15 reps on at lease 2 occasions, maintaining RPE 12-16. Once repetition maximum has been achieved, additional weight sets may be added. Hypertension:  [x] Yes  [] No  Resting BP: 122/72  Peak Exercise BP: 160/84  BP Meds: Toprol XL, Betapace, Cozaar, Bumex    Intervention:  Home Exercise:  Type: walking, cycling  Duration: 30-60  Frequency: At least 2 non-rehab days per week     [x] Resistance Training  Progression:  Introduce 8-15 bilateral upper extremity resistance exercise at 1-3 sets per lift, on 2 non-consecutive days using TheraBand/Weights to 8-15 reps on at lease 2 occasions. Once repetition maximum has been achieved, additional weight sets may be added.     Education:   [x] Equipment [place]  [x] Proper use weights/TheraBands   [x] S/S to report  [x] Low Na Diet    [x] Warm up/ Cool down  [] BP Medication    [x] RPE Scale   [] Understand BP   [x] Ex Safety   [] Home Exercise         Target Goal:   -Individual Exercise Plan  -BP<140/90 or <130/80 if DM   -Aerobic active 30 + minutes 5-7 days per week    Nutrition    Stages of Change:   [] pre-contemplation  [x] Action   [] Contemplate    [] Maintainence   [] Prep    [] Relapse    Lipids:   [] Repeated - Date:     [x] Not repeated  Total Cholesterol:  Triglycerides:   HDL:   LDL:   Lipid Meds: Crestor    Diabetes:  [] Yes  [x] No  FBS:   HbA1c:  Diabetes Medication:  [] Monitor BS at home   Frequency?:     Weight Management:  Weight 195.7  Height: 5'0\"  BMI: 38.2  Wt Goal: BMI <25  Alcohol:   Social History     Substance and Sexual Activity   Alcohol Use Not Currently     Diet Assessment Tool: Food Diary/Rate Your Plate  Special Diet:  2 gram NA+, 30% total fat, <10% saturated fat, 25-35 grams fiber, reduced cholesterol, balanced nutrition    Intervention:   [x] Dietitian Consult       [x] Nurse/Patient Discussion     [] Diabetes           [] Referred to Diabetes Education     Education:  [] S&S hypo/hyperglycemia  [x] Low fat/low cholesterol diet  [x] Weight loss       [] Relate Diabetes/CAD     [] Eating heart healthy handout    Target Goal:  -LDL-C<100 if triglycerides are > 200  -LDL-C < 70 for high risk patients  -HbA1c < 7%  -BMI < 25   -Waist Circumference < 35 inches women/40 inches men  Education    Stages of Change:    [] pre-contemplation  [x] Action   [] Contemplate    [] Maintainence   [] Prep    [] Relapse    Learning Barriers:   [] Speech   [] Cognitive   [] Literacy   [] Vision   [] Hearing    [] Readiness to Learn    Family support: [x] Yes  [] No    Tobacco use:   Social History     Tobacco Use   Smoking Status Never Smoker   Smokeless Tobacco Never Used       Intervention:  [] Referred to physician for smoking cessation    [] Individual education and counseling  [] Tobacco adjunct      Education:   [] Risk Factors   [] Psychological aspects  [] Angina    [] Medications  [] CHF      [] Cardiac A&P    Target Goal:  -Complete cessation of tobacco use (if applicable)  -Continued risk factor modifications  -Recognizing signs/symptoms to report  -Proper use of meds    Psychosocial  Stages of Change:    [] pre-contemplation  [x] Action   [] Contemplate    [] Maintainence   [] Prep    [] Relapse    Psychosocial Test:  Tool Used: Anahi Garcia Quality of Life/PHQ9    Intervention:   [] Psych Consult/   [] Uses stress management skills    [] Physician Referral     Medications:     Education:    [] Stress Management   [] Depression    Target Goal:  -Assess presence or absence of depression using a valid screening tool. -Maximize coping skills.  -Positive support system. Preventative Medication:   [x] Aspirin       [x] Beta Blockade      [x] Statin or other lipid lowering agent     [] Clopidogrel   [x] ACE Inhibitor   [x] Other anticoagulation medications Xarelto  Medication compliance: Yes    Fall Risk assess: [] Yes  [x] No    Low Risk  Assistive Device:    [] Cane  [] Valentino Sings [] Wheel Chair  [] Gait belt    [] NA    Patient Goals: N/A    Program goal:    Increase stamina/strength to 30-60 total exercise minutes exercise 3-5 times per week by increasing 1-2 level/week and 1-5 minutes per week to achieve THR and RPE. Increase average aerobic functional capacity by at least 0.5-1.0 METs within THRRin the next 30 days.     Introduce strength training exercises after week 4    Maintain 2 gram NA+, 30% total fat, <10% saturated fat, 25-35 grams fiber, reduced cholesterol, balanced nutrition diet    Decrease weight gradually 2-4 pounds over next 30 days    Complete abstinence tobacco use       Maintain optimal BP    Maintain lipids WNL    Minimize self-reported psycho-social feelings of stress and/or depression in the next 30 days      Evaluation:   Exercise Working an average MET level at 2.9. With a functional capacity improvement of 0.4 METS   Nutrition  Additional weight loss of 2.6 in last 30 days. Psychosocial   No indication of needed intervention   Education  Continuing exercise prescription. Progressing well. Had office with cardiologist for A fib with RVR and bradycardia, follow up testing in progress.     Physician Changes/Comments:

## 2021-10-27 ENCOUNTER — HOSPITAL ENCOUNTER (OUTPATIENT)
Dept: CARDIAC REHAB | Age: 76
Setting detail: THERAPIES SERIES
Discharge: HOME | End: 2021-10-27
Payer: MEDICARE

## 2021-10-27 PROCEDURE — 93798 PHYS/QHP OP CAR RHAB W/ECG: CPT

## 2021-10-28 ENCOUNTER — HOSPITAL ENCOUNTER (OUTPATIENT)
Dept: CARDIAC REHAB | Age: 76
Setting detail: THERAPIES SERIES
Discharge: HOME | End: 2021-10-28
Payer: MEDICARE

## 2021-10-28 PROCEDURE — 93798 PHYS/QHP OP CAR RHAB W/ECG: CPT

## 2021-11-01 ENCOUNTER — HOSPITAL ENCOUNTER (OUTPATIENT)
Dept: CARDIAC REHAB | Age: 76
Setting detail: THERAPIES SERIES
Discharge: HOME | End: 2021-11-01
Payer: MEDICARE

## 2021-11-01 PROCEDURE — 93798 PHYS/QHP OP CAR RHAB W/ECG: CPT

## 2021-11-03 ENCOUNTER — HOSPITAL ENCOUNTER (OUTPATIENT)
Dept: CARDIAC REHAB | Age: 76
Setting detail: THERAPIES SERIES
Discharge: HOME | End: 2021-11-03
Payer: MEDICARE

## 2021-11-03 PROCEDURE — 93798 PHYS/QHP OP CAR RHAB W/ECG: CPT

## 2021-11-04 ENCOUNTER — HOSPITAL ENCOUNTER (OUTPATIENT)
Dept: CARDIAC REHAB | Age: 76
Setting detail: THERAPIES SERIES
Discharge: HOME | End: 2021-11-04
Payer: MEDICARE

## 2021-11-04 PROCEDURE — 93798 PHYS/QHP OP CAR RHAB W/ECG: CPT

## 2021-11-08 ENCOUNTER — HOSPITAL ENCOUNTER (OUTPATIENT)
Dept: CARDIAC REHAB | Age: 76
Setting detail: THERAPIES SERIES
Discharge: HOME | End: 2021-11-08
Payer: MEDICARE

## 2021-11-08 PROCEDURE — 93798 PHYS/QHP OP CAR RHAB W/ECG: CPT

## 2021-11-09 ENCOUNTER — HOSPITAL ENCOUNTER (OUTPATIENT)
Dept: NON INVASIVE DIAGNOSTICS | Age: 76
Discharge: HOME | End: 2021-11-09
Payer: MEDICARE

## 2021-11-09 DIAGNOSIS — I21.4 NON-ST ELEVATION MYOCARDIAL INFARCTION (NSTEMI) (HCC): ICD-10-CM

## 2021-11-09 DIAGNOSIS — I48.0 PAROXYSMAL ATRIAL FIBRILLATION (HCC): ICD-10-CM

## 2021-11-09 DIAGNOSIS — I10 ESSENTIAL HYPERTENSION: ICD-10-CM

## 2021-11-09 LAB
LV EF: 60 %
LVEF MODALITY: NORMAL

## 2021-11-09 PROCEDURE — 93306 TTE W/DOPPLER COMPLETE: CPT

## 2021-11-10 ENCOUNTER — HOSPITAL ENCOUNTER (OUTPATIENT)
Dept: CARDIAC REHAB | Age: 76
Setting detail: THERAPIES SERIES
Discharge: HOME | End: 2021-11-10
Payer: MEDICARE

## 2021-11-10 ENCOUNTER — TELEPHONE (OUTPATIENT)
Dept: CARDIOLOGY | Age: 76
End: 2021-11-10

## 2021-11-10 PROCEDURE — 93798 PHYS/QHP OP CAR RHAB W/ECG: CPT

## 2021-11-10 NOTE — TELEPHONE ENCOUNTER
----- Message from Estuardo Hsieh PA-C sent at 11/10/2021  1:10 PM EST -----  Please notify patient that their echo was overall okay, we will discuss at next visit.

## 2021-11-11 ENCOUNTER — HOSPITAL ENCOUNTER (OUTPATIENT)
Dept: CARDIAC REHAB | Age: 76
Setting detail: THERAPIES SERIES
Discharge: HOME | End: 2021-11-11
Payer: MEDICARE

## 2021-11-11 ENCOUNTER — TELEPHONE (OUTPATIENT)
Dept: CARDIOLOGY | Age: 76
End: 2021-11-11

## 2021-11-11 PROCEDURE — 93798 PHYS/QHP OP CAR RHAB W/ECG: CPT

## 2021-11-11 NOTE — TELEPHONE ENCOUNTER
----- Message from Adri Kyle MD sent at 11/10/2021 11:06 PM EST -----  Let Ms. Huerta Fairly know their test result was ok. Will discuss at next visit. Thanks.

## 2021-11-15 ENCOUNTER — HOSPITAL ENCOUNTER (OUTPATIENT)
Dept: CARDIAC REHAB | Age: 76
Setting detail: THERAPIES SERIES
Discharge: HOME | End: 2021-11-15

## 2021-11-15 PROCEDURE — 93798 PHYS/QHP OP CAR RHAB W/ECG: CPT

## 2021-11-17 ENCOUNTER — HOSPITAL ENCOUNTER (OUTPATIENT)
Dept: CARDIAC REHAB | Age: 76
Setting detail: THERAPIES SERIES
Discharge: HOME | End: 2021-11-17
Payer: MEDICARE

## 2021-11-17 PROCEDURE — 93798 PHYS/QHP OP CAR RHAB W/ECG: CPT

## 2021-11-18 ENCOUNTER — HOSPITAL ENCOUNTER (OUTPATIENT)
Dept: CARDIAC REHAB | Age: 76
Setting detail: THERAPIES SERIES
Discharge: HOME | End: 2021-11-18
Payer: MEDICARE

## 2021-11-18 PROCEDURE — 93798 PHYS/QHP OP CAR RHAB W/ECG: CPT

## 2021-11-22 ENCOUNTER — HOSPITAL ENCOUNTER (OUTPATIENT)
Dept: CARDIAC REHAB | Age: 76
End: 2021-11-22
Payer: MEDICARE

## 2021-11-22 NOTE — PROGRESS NOTES
Phase II Cardiac Rehab Individualized Treatment Plan - 120 Day    Patient Name: Sunil Stockton  Date of Initial Assessment: 11/22/2021  ACCOUNT #: [de-identified]  Diagnosis: NSTEMI   Onset Date: 7/13/2021  Referring Physician: Dr. Celia Ruelas Stratification: Moderate  Session Number: 31   EXERCISE    Stages of Change:   [] pre-contemplation  [x] Action   [] Contemplate    [] Maintainence   [] Prep   [] Relapse          Exercise Prescription:  Mode: [x] TM [x] B [x] STP  [] R   [x] UBE   [] EL  Frequency: 3 days per week  Duration: 31-60 minutes  Intensity: 3.5-4.0 METs     THRR:  Rest + 20-30 bpm  Progression:   Based on risk stratification, may increase duration per F.I.T.T. protocol parameters on average 5-10 minutes every 1-2 weeks for the first 4-6 weeks. After 3-4 weeks completed, continue to gradually increase F.I.T.T. parameters gradually at the established duration on average 0.5-1.0 METs per 30 days over the course of the remaining program, as established by patient centered goals and guidelines, maintaining RPE 12-16. [x] Resistance Training  Introduce 8-15 bilateral upper extremity resistance exercise at 1-3 sets per lift, on 2-3 non-consecutive days using TheraBand/Weights to 8-15 reps on at lease 2 occasions, maintaining RPE 12-16. Once repetition maximum has been achieved, additional weight sets may be added. [] Angina with Exertion     Hypertension:  [x] Yes  [] No  Resting BP: 126/78  Peak Exercise BP: 144/68  BP Meds: Toprol XL, Cozaar    Intervention:  Home Exercise:  Type: Walking  Duration: 20-60 minutes  Frequency: At least 2 non-rehab days per week     [x] Resistance Training  Progression:  8-15 bilateral upper extremity resistance exercise at 1-3 sets per lift, on 2 non-consecutive days using TheraBand/Weights to 8-15 reps on at lease 2 occasions. Once repetition maximum has been achieved, additional weight sets may be added.     Education:   [x] Equipment [place]  [x] Proper use weights/TheraBands   [x] S/S to report  [x] Low Na Diet    [x] Warm up/ Cool down  [x] BP Medication    [x] RPE Scale   [x] Understand BP   [x] Ex Safety   [x] Home Exercise         Target Goal:   -Individual Exercise Plan  -BP<140/90 or <130/80 if DM   -Aerobic active 30 + minutes 5-7 days per week    Nutrition    Stages of Change:   [] pre-contemplation  [x] Action   [] Contemplate    [] Maintainence   [] Prep    [] Relapse    Lipids:    [] Repeated - Date:     [x] Not repeated  Total Cholesterol:  Triglycerides:   HDL:   LDL:   Lipid Meds: Crestor     Diabetes:  [] Yes  [x] No  FBS:   HbA1c:  Diabetes Medication:  [] Monitor BS at home   Frequency?:     Weight Management:  Weight 196.3  Height: 60 inches  BMI: 38.27  Wt Goal: BMI <25  Alcohol:   Social History     Substance and Sexual Activity   Alcohol Use Not Currently     Diet Assessment Tool: Food Diary/Rate Your Plate  Special Diet:  2 gram NA+, 30% total fat, <10% saturated fat, 25-35 grams fiber, reduced cholesterol, balanced nutrition    Intervention:   [x] Dietitian Consult       [x] Nurse/Patient Discussion     [] Diabetes           [] Referred to Diabetes Education     Education:  [] S&S hypo/hyperglycemia  [x] Low fat/low cholesterol diet  [] Weight loss       [] Relate Diabetes/CAD     [x] Eating heart healthy handout    Target Goal:  -LDL-C<100 if triglycerides are > 200  -LDL-C < 70 for high risk patients  -HbA1c < 7%  -BMI < 25   -Waist Circumference < 35 inches women  Education    Stages of Change:    [] pre-contemplation  [x] Action   [] Contemplate    [] Maintainence   [] Prep    [] Relapse    Learning Barriers:   [] Speech   [] Cognitive   [] Literacy   [] Vision   [] Hearing    [] Readiness to Learn    Family support: [x] Yes  [] No    Tobacco use:   Social History     Tobacco Use   Smoking Status Never Smoker   Smokeless Tobacco Never Used       Intervention:  [] Referred to physician for smoking cessation    [] Individual education and counseling  [] Tobacco adjunct      Education:   [x] Risk Factors   [x] Psychological aspects  [x] Angina    [x] Medications  [x] CHF      [x] Cardiac A&P    Target Goal:  -Complete cessation of tobacco use (if applicable)  -Continued risk factor modifications  -Recognizing signs/symptoms to report  -Proper use of meds    Psychosocial  Stages of Change:    [] pre-contemplation  [x] Action   [] Contemplate    [] Maintainence   [] Prep    [] Relapse    Psychosocial Test:  Tool Used: Anahi Garcia Quality of Life/PHQ9    Intervention:   [] Psych Consult/   [] Uses stress management skills    [] Physician Referral     Medications:     Education:    [x] Stress Management   [x] Depression    Target Goal:  -Assess presence or absence of depression using a valid screening tool. -Maximize coping skills.  -Positive support system. Preventative Medication:   [x] Aspirin       [x] Beta Blockade      [x] Statin or other lipid lowering agent     [] Clopidogrel   [] ACE Inhibitor/ARB   [x] Other anticoagulation medications Xarelto  Medication compliance: 100% stated    Fall Risk assess: [x] Yes  [] No   Low Risk  Assistive Device:    [] Cane  [] Gabriele Janus [] Wheel Chair  [] Gait belt    [] NA    Patient Goals:   NA    30-Day Program Goals: Increase stamina, strength, and flexibility by exercising 31-50 total minutes by engaging in aerobic, resistance, and flexibility workout modalities with the goal of progressively achieving at least 0.5 to 1.0 metabolic equivalant improvement in the next 30 days as evidenced by daily session reports.     Achieve and progress prescribed exercise frequency, intensity, time, and type based upon initial evaluation and/or submaximal graded exercise results as evidenced by the attaining and maintaining the prescribed target heart rate range, a Viki rating of perceived exertion between 11 and 16, duration of >30  50 minutes using multiple exercise modes for at least 3  5 days per week to accumulate a minimum total of 2.5 hours per week of moderate aerobic intensity exercise, as tolerated, evidenced by daily session reports and home workout log. Gradually lose 1  4 lb of body weight over the program, through moderating nutrional intake and performing regular aerobic and strength training exercises as prescribed with improvement evidenced by daily session report comparison. Decrease waist circumference by program completion if waist measurement is > or = 40 inches (male) / > or = 35 inches (female). Introduce and progress 6-10 different bilateral UE and LE progressive resistance exercises focused on major muscle groups using 1-3 sets each per lift, on 2-3 non-consecutive days implementing free and machine weights and/or TheraBands, as appropriate, with a resistance of 40-60% 1-repetition maximum or 10 -15 repetitions to progressive overload and increasing resistance once repetitions have progressed to 15 reps and feel fairly light on 2 prior occasions. Achieve and maintain an optimal average resting blood pressure of <130 / 80 mmHg, or as indicated by this patient's referring provider. Strive for blood lipid optimization with an LDL-C of <100 mg/dL or  LDL 70 mg/dL, an HDL-C of > or = 40 mg/dL for men and > or = 50 mg/dL for women, and a triglyceride level of <150 mg/dL via lifestyle education, behavioral modification, and medication compliance. Develop regular home aerobic exercise program for 20  60 minutes at least 2 non-rehab days per week, excluding  5  10 minutes warm-up and cool-down periods, within the next 30 days, being tracked on home workout log. Minimize self-reported psycho-social feelings of stress in the next 30 days as evidenced by pre- and post- surveys and by routine rounding with patient to ascertain subjective improvements.     Establish heart healthy diet per dietician's recommendations over the  next 30 days, including: Increase whole grains: whole grain bread, oatmeal, brown rice, whole wheat pasta. 1. Include 2 servings of non starchy vegetables every day (broccoli, green beans, carrots, radishes, etc.)  2. Strive for 3 servings of fruit every day (canned in light or own juice, fresh, or frozen)  3. Choose lower fat/lower sodium options when dining out such as grilled chicken sandwich with lettuce and tomato, baked potato or side salad, vegetable pizza with chicken, as evidenced by pre- and post-program nutriton survey and routine rounding with patient to ascertain progression toward goal per patient's self report, food diary, and Rate-your-Plate screening survey. Demonstrate knowledge about risk factor reduction, lifestyle modification, and heart health strategies via verbal feedback and/or with an increase of >=5% accuracy via pre- and post-program education assessment screening tool.     Evaluation:   Preparing for discharge    Electronically signed by Leyda Chun RN on 11/22/21 at 11:38 AM EST    Physician Changes/Comments:

## 2021-11-24 ENCOUNTER — HOSPITAL ENCOUNTER (OUTPATIENT)
Dept: CARDIAC REHAB | Age: 76
Setting detail: THERAPIES SERIES
Discharge: HOME | End: 2021-11-24
Payer: MEDICARE

## 2021-11-24 PROCEDURE — 93798 PHYS/QHP OP CAR RHAB W/ECG: CPT

## 2021-11-25 ENCOUNTER — HOSPITAL ENCOUNTER (OUTPATIENT)
Dept: CARDIAC REHAB | Age: 76
End: 2021-11-25
Payer: MEDICARE

## 2021-11-29 ENCOUNTER — HOSPITAL ENCOUNTER (OUTPATIENT)
Dept: CARDIAC REHAB | Age: 76
Setting detail: THERAPIES SERIES
Discharge: HOME | End: 2021-11-29
Payer: MEDICARE

## 2021-11-29 PROCEDURE — 93798 PHYS/QHP OP CAR RHAB W/ECG: CPT

## 2021-12-01 ENCOUNTER — HOSPITAL ENCOUNTER (OUTPATIENT)
Dept: CARDIAC REHAB | Age: 76
Setting detail: THERAPIES SERIES
Discharge: HOME | End: 2021-12-01
Payer: MEDICARE

## 2021-12-01 PROCEDURE — 93798 PHYS/QHP OP CAR RHAB W/ECG: CPT

## 2021-12-02 ENCOUNTER — HOSPITAL ENCOUNTER (OUTPATIENT)
Dept: CARDIAC REHAB | Age: 76
Discharge: HOME | End: 2021-12-02
Payer: MEDICARE

## 2021-12-02 PROCEDURE — 93798 PHYS/QHP OP CAR RHAB W/ECG: CPT

## 2021-12-06 ENCOUNTER — HOSPITAL ENCOUNTER (OUTPATIENT)
Dept: CARDIAC REHAB | Age: 76
Setting detail: THERAPIES SERIES
Discharge: HOME | End: 2021-12-06
Payer: MEDICARE

## 2021-12-06 PROCEDURE — 93798 PHYS/QHP OP CAR RHAB W/ECG: CPT

## 2021-12-06 NOTE — PROGRESS NOTES
Phase II Cardiac Rehab Individualized Treatment Plan-Final    Patient Name: Meche Hull  Date of Initial Assessment: 12/6/2021  ACCOUNT #: [de-identified]  Diagnosis: NSTEMI   Onset Date: 7/13/2021  Referring Physician: Dr. Susy Dwyer Stratification: Moderate  Session Number: 36   EXERCISE    Stages of Change:   [] pre-contemplation  [] Action   [] Contemplate    [x] Maintainence   [] Prep   [] Relapse          Exercise Prescription:  Mode: [x] TM [x] B [x] STP  [x] R   [x] UBE   [x] EL  Frequency: 3 days per week  Duration: 31-60 minutes  Intensity:  3.5-4.0 METs                THRR: 113-125 (60-75% HRR)  Progression:   Per F.I.T.T. protocol parameters on average 5-10 minutes every 1-2 weeks for the first 4-6 weeks. After 3-4 weeks completed, continue to gradually increase F.I.T.T. parameters gradually at the established duration on average 0.5-1.0 METs per 30 days over the course of the remaining program, as established by patient centered goals and guidelines, maintaining RPE 12-16. [x] Resistance Training  8-15 bilateral upper extremity resistance exercise at 1-3 sets per lift, on 2-3 non-consecutive days using TheraBand/Weights to 8-15 reps on at lease 2 occasions, maintaining RPE 12-16. Once repetition maximum has been achieved, additional weight sets may be added. [] Angina with Exertion    Hypertension:  [x] Yes  [] No  Resting BP: 146/72  Peak Exercise BP: 148/72  BP Meds: Bumex, Cozaar, Toprol XL    Intervention:  Home Exercise:  Type: walking  Duration: 20-60 minutes  Frequency: At least 2 non-rehab days per week     [x] Resistance Training  Progression:  8-12 bilateral upper extremity resistance exercise at 1-3 sets per lift, on 2 non-consecutive days using TheraBand/Weights to 8-15 reps on at lease 2 occasions. Once repetition maximum has been achieved, additional weight sets may be added.     Education:   [x] Equipment [place]  [x] Proper use weights/therabands   [x] S/S to report  [x] Low Na Diet    [x] Warm up/ Cool down  [x] BP Medication    [x] RPE Scale   [x] Understand BP   [x] Ex Safety   [x] Home Exercise         Target Goal:   -Individual Exercise Plan  -BP<140/90 or <130/80 if DM   -Aerobic active 30 + minutes 5-7 days per week    Nutrition    Stages of Change:   [] pre-contemplation  [] Action   [] Contemplate    [x] Maintainence   [] Prep    [] Relapse    Lipids:    [] Repeated - Date:   [x] Not repeated  Total Cholesterol:  Triglycerides:   HDL:   LDL:   Lipid Medications: Crestor    Diabetes:  [] Yes  [x] No  FBS:   HbA1c:  Diabetes Medication: NA  [] Monitor BS at home   Frequency?:     Weight Management:  Weight:194.4 pounds  Height: 60 inches  BMI: 37.9  Waist Circumference: NA  Wt Goal: < 125 pounds  Alcohol:   Social History     Substance and Sexual Activity   Alcohol Use Not Currently     Diet Assessment Tool:   [x]  Food Diary  [x]  Rate My Plate Score: 51  Special Diet:  2 gram NA+, 30% total fat, <10% saturated fat, 25-35 grams fiber, reduced cholesterol, balanced nutrition    Intervention:   [x] Dietitian Consult       [x] Nurse/Patient Discussion     [] Diabetes           [] Referred to Diabetes Education     Education:  [] S&S hypo/hyperglycemia  [x] Low fat/low cholesterol diet  [x] Weight loss       [] Relate Diabetes/CAD     [x] Eating heart healthy handout    Target Goal:  -LDL-C<100 if triglycerides are > 200  -LDL-C < 70 for high risk patients  -HbA1c < 7%  -BMI < 25   -Waist Circumference < 35 inches women  Education    Stages of Change:    [] pre-contemplation  [] Action   [] Contemplate    [x] Maintainence   [] Prep    [] Relapse    Learning Barriers:   [] Speech   [] Cognitive   [] Literacy   [] Vision   [] Hearing    [] Readiness to Learn   [x] None    Knowledge test score: 87%      Family support: [x] Yes  [] No    Tobacco use:   Social History     Tobacco Use   Smoking Status Never Smoker   Smokeless Tobacco Never Used       Intervention:  [] Referred to physician for smoking cessation    [] Individual education and counseling  [] Tobacco adjunct    Education:   [x] Risk Factors     [x] Psychological aspects  [x] Angina      [x] Medications  [] CHF      [x] Cardiac A&P    Target Goal:  -Complete cessation of tobacco use (if applicable)  -Continued risk factor modifications  -Recognizing signs/symptoms to report  -Proper use of meds    Psychosocial  Stages of Change:    [] pre-contemplation  [] Action   [] Contemplate    [x] Maintainence   [] Prep    [] Relapse    Psychosocial Test:  Tool Used:   Anahi Garcia Quality of Life Score: 28/26/30/29/30  PHQ9 score: 0    Intervention:   [] Psych Consult/   [x] Uses stress management skills    [] Physician Referral     Medications:     Education:    [x] Stress Management   [x] Depression    Target Goal:  -Assess presence or absence of depression using a valid screening tool. -Maximize coping skills.  -Positive support system. Preventative Medication:   [x] Aspirin       [x] Beta Blockade      [x] Statin or other lipid lowering agent     [] Clopidogrel   [] ACE Inhibitor/ARB   [x] Other anticoagulation medications:  Xarelto   Medication Compliance (stated):   [x] 100%  [] 75%           [] 50%  [] 25%      [] 0%         Fall Risk assess:   [x] Yes  [] No     Low Risk  Assistive Device:    [] Cane  [] Walker [] Wheel Chair  [] Gait belt    [x] NA    Patient Goals:   NA    Program Goals: Increase stamina, strength, and flexibility by exercising 31-50 total minutes by engaging in aerobic, resistance, and flexibility workout modalities with the goal of progressively achieving at least 0.5 to 1.0 metabolic equivalant improvement in the next 30 days as evidenced by daily session reports.     Achieve and progress prescribed exercise frequency, intensity, time, and type based upon initial evaluation and submaximal graded exercise results as evidenced by the attaining and maintaining the prescribed target heart rate range, subjective improvements. Establish and maintain individualized heart healthy eating plan per dietician recommendations, as evidenced by pre- and post-program nutriton survey and routine rounding with patient to ascertain progression toward goal per patient's self report, food diary, and Rate-your-Plate screening survey. Demonstrate knowledge about risk factor reduction, lifestyle modification, and heart health strategies with with an increase of >=5% accuracy via pre- and post-program education assessment screening tool. Evaluation:   [x] Goal(s) Met:  Partially   [] Goal(s) Not Met  Compliance/ER-Hosp visits:  Paola Roberson completed 36 of 36 prescribed cardiac rehab sessions. She had one ER visit over her program for pranay/tachy syndrome (AF). Exercise:  She achieved a 40 % functional capacity improvement. Nutrition/Tobacco: She maintained a BMI > 30, but did manage to lose 6 pounds over her stay. She remained tobacco free. Psychosocial:  Her QOL survey and PHQ9 both indicated an improvement in QOL scores. She required no intervention for psychosocial issues. Education:  She participated in all of the education classes offered, and scored an 86% on the post-education assessment.     Electronically signed by Rosie Gonzales RN on 12/6/21 at 4:02 PM EST    Physician Changes/Comments:

## 2022-01-13 ENCOUNTER — APPOINTMENT (OUTPATIENT)
Dept: GENERAL RADIOLOGY | Age: 77
End: 2022-01-13
Payer: MEDICARE

## 2022-01-13 ENCOUNTER — HOSPITAL ENCOUNTER (EMERGENCY)
Age: 77
Discharge: HOME | End: 2022-01-14
Attending: EMERGENCY MEDICINE
Payer: MEDICARE

## 2022-01-13 DIAGNOSIS — R42 LIGHTHEADEDNESS: ICD-10-CM

## 2022-01-13 DIAGNOSIS — R55 NEAR SYNCOPE: ICD-10-CM

## 2022-01-13 DIAGNOSIS — M79.602 LEFT ARM PAIN: Primary | ICD-10-CM

## 2022-01-13 LAB
ABSOLUTE EOS #: 0.35 K/UL (ref 0–0.44)
ABSOLUTE IMMATURE GRANULOCYTE: <0.03 K/UL (ref 0–0.3)
ABSOLUTE LYMPH #: 1.82 K/UL (ref 1.1–3.7)
ABSOLUTE MONO #: 0.53 K/UL (ref 0.1–1.2)
ANION GAP SERPL CALCULATED.3IONS-SCNC: 12 MMOL/L (ref 9–17)
BASOPHILS # BLD: 1 % (ref 0–2)
BASOPHILS ABSOLUTE: 0.04 K/UL (ref 0–0.2)
BUN BLDV-MCNC: 19 MG/DL (ref 8–23)
BUN/CREAT BLD: 29 (ref 9–20)
CALCIUM SERPL-MCNC: 9.6 MG/DL (ref 8.6–10.4)
CHLORIDE BLD-SCNC: 103 MMOL/L (ref 98–107)
CO2: 25 MMOL/L (ref 20–31)
CREAT SERPL-MCNC: 0.65 MG/DL (ref 0.5–0.9)
D-DIMER QUANTITATIVE: 0.34 MG/L FEU (ref 0–0.59)
DIFFERENTIAL TYPE: ABNORMAL
EOSINOPHILS RELATIVE PERCENT: 7 % (ref 1–4)
GFR AFRICAN AMERICAN: >60 ML/MIN
GFR NON-AFRICAN AMERICAN: >60 ML/MIN
GFR SERPL CREATININE-BSD FRML MDRD: ABNORMAL ML/MIN/{1.73_M2}
GFR SERPL CREATININE-BSD FRML MDRD: ABNORMAL ML/MIN/{1.73_M2}
GLUCOSE BLD-MCNC: 150 MG/DL (ref 70–99)
HCT VFR BLD CALC: 45.2 % (ref 36.3–47.1)
HEMOGLOBIN: 15 G/DL (ref 11.9–15.1)
IMMATURE GRANULOCYTES: 0 %
LYMPHOCYTES # BLD: 35 % (ref 24–43)
MCH RBC QN AUTO: 30.2 PG (ref 25.2–33.5)
MCHC RBC AUTO-ENTMCNC: 33.2 G/DL (ref 28.4–34.8)
MCV RBC AUTO: 91.1 FL (ref 82.6–102.9)
MONOCYTES # BLD: 10 % (ref 3–12)
NRBC AUTOMATED: 0 PER 100 WBC
PDW BLD-RTO: 13.1 % (ref 11.8–14.4)
PLATELET # BLD: 153 K/UL (ref 138–453)
PLATELET ESTIMATE: ABNORMAL
PMV BLD AUTO: 10.8 FL (ref 8.1–13.5)
POTASSIUM SERPL-SCNC: 3.8 MMOL/L (ref 3.7–5.3)
RBC # BLD: 4.96 M/UL (ref 3.95–5.11)
RBC # BLD: ABNORMAL 10*6/UL
SEG NEUTROPHILS: 47 % (ref 36–65)
SEGMENTED NEUTROPHILS ABSOLUTE COUNT: 2.51 K/UL (ref 1.5–8.1)
SODIUM BLD-SCNC: 140 MMOL/L (ref 135–144)
TROPONIN INTERP: ABNORMAL
TROPONIN INTERP: ABNORMAL
TROPONIN T: ABNORMAL NG/ML
TROPONIN T: ABNORMAL NG/ML
TROPONIN, HIGH SENSITIVITY: 18 NG/L (ref 0–14)
TROPONIN, HIGH SENSITIVITY: 21 NG/L (ref 0–14)
WBC # BLD: 5.3 K/UL (ref 3.5–11.3)
WBC # BLD: ABNORMAL 10*3/UL

## 2022-01-13 PROCEDURE — 99285 EMERGENCY DEPT VISIT HI MDM: CPT

## 2022-01-13 PROCEDURE — 85379 FIBRIN DEGRADATION QUANT: CPT

## 2022-01-13 PROCEDURE — 96375 TX/PRO/DX INJ NEW DRUG ADDON: CPT

## 2022-01-13 PROCEDURE — 71045 X-RAY EXAM CHEST 1 VIEW: CPT

## 2022-01-13 PROCEDURE — 84484 ASSAY OF TROPONIN QUANT: CPT

## 2022-01-13 PROCEDURE — 96374 THER/PROPH/DIAG INJ IV PUSH: CPT

## 2022-01-13 PROCEDURE — 36415 COLL VENOUS BLD VENIPUNCTURE: CPT

## 2022-01-13 PROCEDURE — 96376 TX/PRO/DX INJ SAME DRUG ADON: CPT

## 2022-01-13 PROCEDURE — 80048 BASIC METABOLIC PNL TOTAL CA: CPT

## 2022-01-13 PROCEDURE — 85025 COMPLETE CBC W/AUTO DIFF WBC: CPT

## 2022-01-13 PROCEDURE — 6370000000 HC RX 637 (ALT 250 FOR IP): Performed by: EMERGENCY MEDICINE

## 2022-01-13 PROCEDURE — 93005 ELECTROCARDIOGRAM TRACING: CPT | Performed by: EMERGENCY MEDICINE

## 2022-01-13 PROCEDURE — 6360000002 HC RX W HCPCS: Performed by: EMERGENCY MEDICINE

## 2022-01-13 RX ORDER — MORPHINE SULFATE 2 MG/ML
2 INJECTION, SOLUTION INTRAMUSCULAR; INTRAVENOUS ONCE
Status: COMPLETED | OUTPATIENT
Start: 2022-01-13 | End: 2022-01-13

## 2022-01-13 RX ORDER — ROSUVASTATIN CALCIUM 20 MG/1
20 TABLET, COATED ORAL NIGHTLY
Status: DISCONTINUED | OUTPATIENT
Start: 2022-01-13 | End: 2022-01-14 | Stop reason: HOSPADM

## 2022-01-13 RX ORDER — SOTALOL HYDROCHLORIDE 80 MG/1
80 TABLET ORAL 2 TIMES DAILY
Status: DISCONTINUED | OUTPATIENT
Start: 2022-01-13 | End: 2022-01-14 | Stop reason: HOSPADM

## 2022-01-13 RX ORDER — CYCLOBENZAPRINE HCL 10 MG
10 TABLET ORAL ONCE
Status: COMPLETED | OUTPATIENT
Start: 2022-01-13 | End: 2022-01-13

## 2022-01-13 RX ORDER — ONDANSETRON 2 MG/ML
4 INJECTION INTRAMUSCULAR; INTRAVENOUS ONCE
Status: COMPLETED | OUTPATIENT
Start: 2022-01-13 | End: 2022-01-13

## 2022-01-13 RX ADMIN — ONDANSETRON 4 MG: 2 INJECTION INTRAMUSCULAR; INTRAVENOUS at 10:44

## 2022-01-13 RX ADMIN — CYCLOBENZAPRINE 10 MG: 10 TABLET, FILM COATED ORAL at 20:04

## 2022-01-13 RX ADMIN — MORPHINE SULFATE 2 MG: 2 INJECTION, SOLUTION INTRAMUSCULAR; INTRAVENOUS at 20:04

## 2022-01-13 RX ADMIN — ROSUVASTATIN CALCIUM 20 MG: 20 TABLET, FILM COATED ORAL at 21:11

## 2022-01-13 RX ADMIN — ONDANSETRON 4 MG: 2 INJECTION INTRAMUSCULAR; INTRAVENOUS at 20:03

## 2022-01-13 RX ADMIN — SOTALOL HYDROCHLORIDE 80 MG: 80 TABLET ORAL at 21:11

## 2022-01-13 RX ADMIN — MORPHINE SULFATE 2 MG: 2 INJECTION, SOLUTION INTRAMUSCULAR; INTRAVENOUS at 10:42

## 2022-01-13 RX ADMIN — MORPHINE SULFATE 2 MG: 2 INJECTION, SOLUTION INTRAMUSCULAR; INTRAVENOUS at 12:04

## 2022-01-13 ASSESSMENT — PAIN SCALES - GENERAL
PAINLEVEL_OUTOF10: 8
PAINLEVEL_OUTOF10: 8
PAINLEVEL_OUTOF10: 3
PAINLEVEL_OUTOF10: 6
PAINLEVEL_OUTOF10: 8
PAINLEVEL_OUTOF10: 4
PAINLEVEL_OUTOF10: 6
PAINLEVEL_OUTOF10: 8

## 2022-01-13 ASSESSMENT — PAIN DESCRIPTION - LOCATION
LOCATION: NECK
LOCATION: ARM

## 2022-01-13 ASSESSMENT — PAIN DESCRIPTION - FREQUENCY: FREQUENCY: CONTINUOUS

## 2022-01-13 ASSESSMENT — PAIN DESCRIPTION - DESCRIPTORS: DESCRIPTORS: ACHING

## 2022-01-13 ASSESSMENT — PAIN DESCRIPTION - ORIENTATION
ORIENTATION: LEFT;UPPER
ORIENTATION: POSTERIOR

## 2022-01-13 ASSESSMENT — PAIN DESCRIPTION - PAIN TYPE
TYPE: ACUTE PAIN
TYPE: ACUTE PAIN

## 2022-01-13 NOTE — ED PROVIDER NOTES
[facility] [place] ED  EMERGENCY DEPARTMENT ENCOUNTER      Pt Name: Jaymie Mendez  MRN: [id number]  [place] 1945  Date of evaluation: 1/13/2022  Provider: Heather Hester MD    [address] [place] [address]       Chief Complaint   Patient presents with    Arm Pain     pt c/o left arm and left lateral neck pain, onset this am         HISTORY OF PRESENT ILLNESS   (Location/Symptom, Timing/Onset, Context/Setting, Quality, Duration, Modifying Factors, Severity)  Note limiting factors. Jaymie Mendez is a 68 y.o. female who presents to the emergency department     68year-old patient presents emergency department with concerns gradual onset of left arm pain left upper back pain which she noticed approximately 5 AM on the day of ED arrival.  The patient had went back to sleep only to awaken at 8 AM with similar discomfort. Discomfort is partially reproduced with movement of her left arm. She denies any associated chest discomfort. The patient does relate that she had similar arm and neck pain when she was diagnosed with a myocardial infarction previously she also relates that she has felt lightheaded (she denies any symptoms suggestive of vertigo i.e. spinning of the rooms or herself) patient denies any shortness of breath. Most recently in July she had a myocardial infarction. At that time the patient did have chest pain left upper back pain and some left arm pain. The patient admits to no injuries. She denies any specific neck discomfort. Patient did take her in access to her medications today    Patient reportedly had a myocardial infarction and did not necessitate require stenting  13th of this year          Nursing Notes were reviewed. REVIEW OF SYSTEMS    (2-9 systems for level 4, 10 or more for level 5)     Review of Systems   All other systems reviewed and are negative. Except as noted above the remainder of the review of systems was reviewed and negative.        PAST MEDICAL HISTORY     Past Medical History:   Diagnosis Date    CAD (coronary artery disease)     Hyperlipidemia     Hypertension          SURGICAL HISTORY       Past Surgical History:   Procedure Laterality Date    HYSTERECTOMY      JOINT REPLACEMENT Bilateral          CURRENT MEDICATIONS       Discharge Medication List as of 1/14/2022 12:35 PM      CONTINUE these medications which have NOT CHANGED    Details   Artificial Tear Ointment (DRY EYES OP) Apply 2 drops to eye as neededHistorical Med      aspirin 81 MG EC tablet Take 81 mg by mouth dailyHistorical Med      calcium carbonate (OSCAL) 500 MG TABS tablet Take 600 mg by mouth 2 times daily Historical Med      Multiple Vitamins-Minerals (CENTRUM SILVER 50+WOMEN PO) Take by mouthHistorical Med      rosuvastatin (CRESTOR) 20 MG tablet Take 1 tablet by mouth daily, Disp-90 tablet, R-3Normal      metoprolol succinate (TOPROL XL) 50 MG extended release tablet Take 1 tablet by mouth daily, Disp-90 tablet, R-3Normal      XARELTO 20 MG TABS tablet Take 20 mg by mouth daily (with breakfast) , DAWHistorical Med      sotalol (BETAPACE) 80 MG tablet Take 80 mg by mouth 2 times daily Historical Med      losartan (COZAAR) 50 MG tablet Take 50 mg by mouth dailyHistorical Med      bumetanide (BUMEX) 1 MG tablet Take 1.5 mg by mouth dailyHistorical Med      omeprazole (PRILOSEC) 20 MG delayed release capsule Take 40 mg by mouth dailyHistorical Med      docusate sodium (COLACE) 100 MG capsule Take 100 mg by mouth 2 times dailyHistorical Med             ALLERGIES     Patient has no known allergies.     FAMILY HISTORY       Family History   Problem Relation Age of Onset    Heart Attack Father     Cancer Sister     Atrial Fibrillation Sister     Heart Disease Brother     Diabetes Brother     Atrial Fibrillation Sister           SOCIAL HISTORY       Social History     Socioeconomic History    Marital status:      Spouse name: None    Number of children: None    Years of education: None    Highest education level: None   Occupational History    None   Tobacco Use    Smoking status: Never Smoker    Smokeless tobacco: Never Used   Substance and Sexual Activity    Alcohol use: Not Currently    Drug use: Never    Sexual activity: None   Other Topics Concern    None   Social History Narrative    None     Social Determinants of Health     Financial Resource Strain:     Difficulty of Paying Living Expenses: Not on file   Food Insecurity:     Worried About Running Out of Food in the Last Year: Not on file    Jesus of Food in the Last Year: Not on file   Transportation Needs:     Lack of Transportation (Medical): Not on file    Lack of Transportation (Non-Medical): Not on file   Physical Activity:     Days of Exercise per Week: Not on file    Minutes of Exercise per Session: Not on file   Stress:     Feeling of Stress : Not on file   Social Connections:     Frequency of Communication with Friends and Family: Not on file    Frequency of Social Gatherings with Friends and Family: Not on file    Attends Adventist Services: Not on file    Active Member of [address] [place] [address] or Organizations: Not on file    Attends Club or Organization Meetings: Not on file    Marital Status: Not on file   Intimate Partner Violence:     Fear of Current or Ex-Partner: Not on file    Emotionally Abused: Not on file    Physically Abused: Not on file    Sexually Abused: Not on file   Housing Stability:     Unable to Pay for Housing in the Last Year: Not on file    Number of [place] in the Last Year: Not on file    Unstable Housing in the Last Year: Not on file       SCREENINGS                        PHYSICAL EXAM    (up to 7 for level 4, 8 or more for level 5)     ED Triage Vitals   BP Temp Temp Source Pulse Resp SpO2 Height Weight   01/13/22 0951 01/13/22 1001 01/13/22 1001 01/13/22 0952 01/13/22 0952 01/13/22 0951 -- --   (!) 211/91 98.4 °F (36.9 °C) Oral 87 17 95 %         Physical Exam  Vitals reviewed. PORTABLE   Final Result   Some senescent changes compatible with the age of the patient. No evidence of significant interval change when compared to October 11, 2021. ED BEDSIDE ULTRASOUND:   Performed by ED Physician - none    LABS:  Labs Reviewed   BASIC METABOLIC PANEL - Abnormal; Notable for the following components:       Result Value    Glucose 150 (*)     Bun/Cre Ratio 29 (*)     All other components within normal limits   CBC WITH AUTO DIFFERENTIAL - Abnormal; Notable for the following components:    Eosinophils % 7 (*)     All other components within normal limits   TROPONIN - Abnormal; Notable for the following components:    Troponin, High Sensitivity 21 (*)     All other components within normal limits   TROPONIN - Abnormal; Notable for the following components:    Troponin, High Sensitivity 18 (*)     All other components within normal limits   TROPONIN - Abnormal; Notable for the following components:    Troponin, High Sensitivity 25 (*)     All other components within normal limits   D-DIMER, QUANTITATIVE       All other labs were within normal range or not returned as of this dictation.     EMERGENCY DEPARTMENT COURSE and DIFFERENTIAL DIAGNOSIS/MDM:   Vitals:    Vitals:    01/14/22 1131 01/14/22 1146 01/14/22 1201 01/14/22 1216   BP: (!) 142/44 (!) 147/129 (!) 119/90 (!) 147/75   Pulse: 63 62 65 63   Resp: 15 13 23 15   Temp:       TempSrc:       SpO2: 93%   93%         MDM  Number of Diagnoses or Management Options  Left arm pain  Lightheadedness  Near syncope  Diagnosis management comments: 71-year-old patient presents to the emergency department with concerns as documented in the HPI    Diagnostic considerations ACS syndrome, deep venous thrombosis, pulmonary emboli, disc herniation, musculoskeletal discomfort    Laboratory studies imaging studies electrocardiogram interpretation have been reviewed and discussed with Dr. Robert Machado  patient's cardiologist and Dr. Shirin Ryan to sit the patient up and ambulate her unsuccessful secondary to the patient's feeling of lightheadedness-she had no focal neurologic signs or was no seizure activity no bowel or bladder incontinence  Serial cardiac enzymes are flat, serial EKGs no acute process  I have consulted Dr. Scott Peña patient's cardiologist who is also agreeable with patient evaluation and disposition as planned and documented    Consultation undertaken with Dr. Russ Peres agreeable to have the patient admitted        REASSESSMENT     Patient did have minimal relief after IV analgesia of her left arm discomfort  ED Course as of 01/14/22 1252   u Jan 13, 2022   1019 EKG 12 Lead  Performed at 59 sinus bradycardia with sinus arrhythmia-MD interval 0.168 QRS duration 0.084-QTc corrected 0.431 there is no ST segment elevation [RS]   1127 XR CHEST PORTABLE  X-ray chest no acute process radiologist read [RS]   1331 EKG 12 Lead  Performed at 1301-ventricular rate is 49, MD interval 0.158, QRS duration 0.098-QTc corrected 0.428 there is no evidence of any ST segment elevation [RS]   Fri Jan 14, 2022   1251 D-Dimer, Quantitative:    D-Dimer, Quant 0.34 [RS]   1251 Troponin(!):    Troponin, High Sensitivity 25(!)   Troponin T NOT REPORTED   Troponin Interp NOT REPORTED [RS]   8629 Basic Metabolic Panel(!):    Glucose 150(!)   BUN 19   Creatinine 0.65   Bun/Cre Ratio 29(!)   CALCIUM, SERUM, 039585 9.6   Sodium 140   Potassium 3.8   Chloride 103   CO2 25   Anion Gap 12   GFR Non- >60   GFR  >60   GFR Comment        GFR Staging      [RS]   1252 CBC Auto Differential(!):    WBC 5.3   RBC 4.96   Hemoglobin Quant 15.0   Hematocrit 45.2   MCV 91.1   MCH 30.2   MCHC 33.2   RDW 13.1   Platelet Count 458   MPV 10.8   NRBC Automated 0.0   Differential Type NOT REPORTED   Seg Neutrophils 47   Lymphocytes 35   Monocytes 10   Eosinophils % 7(!)   Basophils 1   Immature Granulocytes 0   Segs Absolute 2.51   Absolute Lymph # 1.82   Absolute Mono # 0.53   Absolute Eos # 0.35   Basophils Absolute 0.04   Absolute Immature Granulocyte <0.03   WBC Morphology NOT REPORTED   RBC Morphology NOT REPORTED   Platelet Estimate NOT REPORTED [RS]      ED Course User Index  [RS] Paul Barth MD         CRITICAL CARE TIME   Total Critical Care time was minutes, excluding separately reportable procedures. There was a high probability of clinically significant/life threatening deterioration in the patient's condition which required my urgent intervention. CONSULTS:  As documented in the MDM    PROCEDURES:  Unless otherwise noted below, none     Procedures    FINAL IMPRESSION      1. Left arm pain    2. Lightheadedness    3. Near syncope          DISPOSITION/PLAN   DISPOSITION        PATIENT REFERRED TO:  Kacey Sarkar MD  [place] [id number]  [phone]      Follow-up next week      DISCHARGE MEDICATIONS:  Discharge Medication List as of 1/14/2022 12:35 PM      START taking these medications    Details   HYDROcodone-acetaminophen (NORCO) 5-325 MG per tablet Take 1 tablet by mouth every 6 hours as needed for Pain for up to 3 days. Intended supply: 3 days. Take lowest dose possible to manage pain, Disp-12 tablet, R-0Normal           Controlled Substances Monitoring:     No flowsheet data found.     (Please note that portions of this note were completed with a voice recognition program.  Efforts were made to edit the dictations but occasionally words are mis-transcribed.)    Paul Barth MD (electronically signed)  Attending Emergency Physician            Paul Barth MD  01/14/22 6192       Carlee Quezada MD  01/25/22 5104

## 2022-01-13 NOTE — ED NOTES
Bed: 02  Expected date:   Expected time:   Means of arrival:   Comments:  EMS       Cecelia Infante RN  01/13/22 4185

## 2022-01-13 NOTE — ED NOTES
Called both [facility] and [facility] for Dr. Arun Mclean, no answer from either at this time. Messages left for both.       Ranjith Alberto  01/13/22 7423

## 2022-01-14 VITALS
RESPIRATION RATE: 15 BRPM | DIASTOLIC BLOOD PRESSURE: 75 MMHG | SYSTOLIC BLOOD PRESSURE: 147 MMHG | HEART RATE: 63 BPM | TEMPERATURE: 98.4 F | OXYGEN SATURATION: 93 %

## 2022-01-14 LAB
EKG ATRIAL RATE: 49 BPM
EKG ATRIAL RATE: 59 BPM
EKG ATRIAL RATE: 60 BPM
EKG P AXIS: 32 DEGREES
EKG P AXIS: 35 DEGREES
EKG P AXIS: 6 DEGREES
EKG P-R INTERVAL: 158 MS
EKG P-R INTERVAL: 160 MS
EKG P-R INTERVAL: 168 MS
EKG Q-T INTERVAL: 436 MS
EKG Q-T INTERVAL: 444 MS
EKG Q-T INTERVAL: 474 MS
EKG QRS DURATION: 102 MS
EKG QRS DURATION: 84 MS
EKG QRS DURATION: 98 MS
EKG QTC CALCULATION (BAZETT): 428 MS
EKG QTC CALCULATION (BAZETT): 431 MS
EKG QTC CALCULATION (BAZETT): 444 MS
EKG R AXIS: -19 DEGREES
EKG R AXIS: -20 DEGREES
EKG R AXIS: -22 DEGREES
EKG T AXIS: -5 DEGREES
EKG T AXIS: 21 DEGREES
EKG T AXIS: 21 DEGREES
EKG VENTRICULAR RATE: 49 BPM
EKG VENTRICULAR RATE: 59 BPM
EKG VENTRICULAR RATE: 60 BPM
TROPONIN INTERP: ABNORMAL
TROPONIN T: ABNORMAL NG/ML
TROPONIN, HIGH SENSITIVITY: 25 NG/L (ref 0–14)

## 2022-01-14 PROCEDURE — 6360000002 HC RX W HCPCS: Performed by: EMERGENCY MEDICINE

## 2022-01-14 PROCEDURE — 93005 ELECTROCARDIOGRAM TRACING: CPT | Performed by: EMERGENCY MEDICINE

## 2022-01-14 PROCEDURE — 93010 ELECTROCARDIOGRAM REPORT: CPT | Performed by: FAMILY MEDICINE

## 2022-01-14 PROCEDURE — 96376 TX/PRO/DX INJ SAME DRUG ADON: CPT

## 2022-01-14 PROCEDURE — 84484 ASSAY OF TROPONIN QUANT: CPT

## 2022-01-14 PROCEDURE — 36415 COLL VENOUS BLD VENIPUNCTURE: CPT

## 2022-01-14 PROCEDURE — 6370000000 HC RX 637 (ALT 250 FOR IP): Performed by: EMERGENCY MEDICINE

## 2022-01-14 RX ORDER — MORPHINE SULFATE 2 MG/ML
2 INJECTION, SOLUTION INTRAMUSCULAR; INTRAVENOUS EVERY 4 HOURS PRN
Status: DISCONTINUED | OUTPATIENT
Start: 2022-01-14 | End: 2022-01-14 | Stop reason: HOSPADM

## 2022-01-14 RX ORDER — HYDROCODONE BITARTRATE AND ACETAMINOPHEN 5; 325 MG/1; MG/1
1 TABLET ORAL EVERY 6 HOURS PRN
Qty: 12 TABLET | Refills: 0 | Status: SHIPPED | OUTPATIENT
Start: 2022-01-14 | End: 2022-01-17

## 2022-01-14 RX ADMIN — MORPHINE SULFATE 2 MG: 2 INJECTION, SOLUTION INTRAMUSCULAR; INTRAVENOUS at 09:52

## 2022-01-14 ASSESSMENT — PAIN SCALES - GENERAL: PAINLEVEL_OUTOF10: 5

## 2022-01-14 NOTE — ED NOTES
Spoke with pt and , plan of care at this time is to admit. Pt states she is feeling slightly better, less dizzy. Given a meal tray and water, mentioned that we will reassess to see if pt has improvement. Both are in agreeance with that. VS obtained as well. Denies additional needs at this time.       [facility]  01/13/22 1918

## 2022-01-14 NOTE — PROGRESS NOTES
Discussed discharge plans with the patient. Patient is a 68year old female here with Left arm pain and Lightheadedness. She is alert , oriented , pleasant , and cooperatives during our conversation. Patient is  and lives at home with her . She uses no medical equipment. Patient does the cooking and the cleaning. She is independent with her ADL's. Patient manages her own medications and drives. She has no outside services in the home. Her PCP is Dr.Scott HANNA Weber DO. She has medical insurance that helps with medication costs. The discharge plan is home with no services at this time. Patient does not know if she has advance directives, she thinks she does. LSW to monitor and assist with any needs or concerns as they arise.     MANUEL Mariscal

## 2022-01-14 NOTE — ED PROVIDER NOTES
[facility] [place] ED  EMERGENCY DEPARTMENT ENCOUNTER      Pt Name: Aron Barnes  MRN: [id number]  [place] 1945  Date of evaluation: 1/13/2022  Provider: Anant Hdez MD    [address]       Chief Complaint   Patient presents with    Arm Pain     pt c/o left arm and left lateral neck pain, onset this am         HISTORY OF PRESENT ILLNESS   (Location/Symptom, Timing/Onset, Context/Setting, Quality, Duration, Modifying Factors, Severity)  Note limiting factors. Aron Barnes is a 68 y.o. female who presents to the emergency department      Patient awaiting bed on a MedSurg floor at time of signout. Please refer to previous providers notes for history of present illness review of systems and physical exam findings. Patient been admitted for left upper extremity pain known coronary artery disease and lightheadedness. No beds were available at time of signout. She is currently resting comfortably. She is chest pain-free. She will troponin was 21. Repeat troponin was 18. Patient is feeling well. She had received a few doses of morphine which seemed to alleviate her left shoulder pain. Currently resting comfortably without any complaints. Patient was stating she did wish to go home. Nursing Notes were reviewed. REVIEW OF SYSTEMS    (2-9 systems for level 4, 10 or more for level 5)     Review of Systems   All other systems reviewed and are negative. Except as noted above the remainder of the review of systems was reviewed and negative.        PAST MEDICAL HISTORY     Past Medical History:   Diagnosis Date    CAD (coronary artery disease)     Hyperlipidemia     Hypertension          SURGICAL HISTORY       Past Surgical History:   Procedure Laterality Date    HYSTERECTOMY      JOINT REPLACEMENT Bilateral          CURRENT MEDICATIONS       Previous Medications    ARTIFICIAL TEAR OINTMENT (DRY EYES OP)    Apply 2 drops to eye as needed    ASPIRIN 81 MG EC TABLET    Take 81 mg by mouth daily    BUMETANIDE (BUMEX) 1 MG TABLET    Take 1.5 mg by mouth daily    CALCIUM CARBONATE (OSCAL) 500 MG TABS TABLET    Take 600 mg by mouth 2 times daily     DOCUSATE SODIUM (COLACE) 100 MG CAPSULE    Take 100 mg by mouth 2 times daily    LOSARTAN (COZAAR) 50 MG TABLET    Take 50 mg by mouth daily    METOPROLOL SUCCINATE (TOPROL XL) 50 MG EXTENDED RELEASE TABLET    Take 1 tablet by mouth daily    MULTIPLE VITAMINS-MINERALS (CENTRUM SILVER 50+WOMEN PO)    Take by mouth    OMEPRAZOLE (PRILOSEC) 20 MG DELAYED RELEASE CAPSULE    Take 40 mg by mouth daily    ROSUVASTATIN (CRESTOR) 20 MG TABLET    Take 1 tablet by mouth daily    SOTALOL (BETAPACE) 80 MG TABLET    Take 80 mg by mouth 2 times daily     XARELTO 20 MG TABS TABLET    Take 20 mg by mouth daily (with breakfast)        ALLERGIES     Patient has no known allergies. FAMILY HISTORY       Family History   Problem Relation Age of Onset    Heart Attack Father     Cancer Sister     Atrial Fibrillation Sister     Heart Disease Brother     Diabetes Brother     Atrial Fibrillation Sister           SOCIAL HISTORY       Social History     Socioeconomic History    Marital status:      Spouse name: None    Number of children: None    Years of education: None    Highest education level: None   Occupational History    None   Tobacco Use    Smoking status: Never Smoker    Smokeless tobacco: Never Used   Substance and Sexual Activity    Alcohol use: Not Currently    Drug use: Never    Sexual activity: None   Other Topics Concern    None   Social History Narrative    None     Social Determinants of Health     Financial Resource Strain:     Difficulty of Paying Living Expenses: Not on file   Food Insecurity:     Worried About Running Out of Food in the Last Year: Not on file    Jesus of Food in the Last Year: Not on file   Transportation Needs:     Lack of Transportation (Medical): Not on file    Lack of Transportation (Non-Medical):  Not on file   Physical Activity:     Days of Exercise per Week: Not on file    Minutes of Exercise per Session: Not on file   Stress:     Feeling of Stress : Not on file   Social Connections:     Frequency of Communication with Friends and Family: Not on file    Frequency of Social Gatherings with Friends and Family: Not on file    Attends Hinduism Services: Not on file    Active Member of [address] [place] [address] or Organizations: Not on file    Attends Club or Organization Meetings: Not on file    Marital Status: Not on file   Intimate Partner Violence:     Fear of Current or Ex-Partner: Not on file    Emotionally Abused: Not on file    Physically Abused: Not on file    Sexually Abused: Not on file   Housing Stability:     Unable to Pay for Housing in the Last Year: Not on file    Number of [place] in the Last Year: Not on file    Unstable Housing in the Last Year: Not on file       SCREENINGS                        PHYSICAL EXAM    (up to 7 for level 4, 8 or more for level 5)     ED Triage Vitals   BP Temp Temp Source Pulse Resp SpO2 Height Weight   01/13/22 0951 01/13/22 1001 01/13/22 1001 01/13/22 0952 01/13/22 0952 01/13/22 0951 -- --   (!) 211/91 98.4 °F (36.9 °C) Oral 87 17 95 %         Physical Exam  Vitals and nursing note reviewed. Constitutional:       General: She is not in acute distress. Appearance: She is not toxic-appearing. HENT:      Head: Normocephalic and atraumatic. Cardiovascular:      Rate and Rhythm: Normal rate and regular rhythm. Neurological:      Mental Status: She is alert.          DIAGNOSTIC RESULTS     EKG: All EKG's are interpreted by the Emergency Department Physician who either signs or Co-signs this chart in the absence of a cardiologist.        RADIOLOGY:   Non-plain film images such as CT, Ultrasound and MRI are read by the radiologist. Plain radiographic images are visualized and preliminarily interpreted by the emergency physician with the below findings:        Interpretation per the Radiologist below, if available at the time of this note:    XR CHEST PORTABLE   Final Result   Some senescent changes compatible with the age of the patient. No evidence of significant interval change when compared to October 11, 2021. ED BEDSIDE ULTRASOUND:   Performed by ED Physician - none    LABS:  Labs Reviewed   BASIC METABOLIC PANEL - Abnormal; Notable for the following components:       Result Value    Glucose 150 (*)     Bun/Cre Ratio 29 (*)     All other components within normal limits   CBC WITH AUTO DIFFERENTIAL - Abnormal; Notable for the following components:    Eosinophils % 7 (*)     All other components within normal limits   TROPONIN - Abnormal; Notable for the following components:    Troponin, High Sensitivity 21 (*)     All other components within normal limits   TROPONIN - Abnormal; Notable for the following components:    Troponin, High Sensitivity 18 (*)     All other components within normal limits   TROPONIN - Abnormal; Notable for the following components:    Troponin, High Sensitivity 25 (*)     All other components within normal limits   D-DIMER, QUANTITATIVE       All other labs were within normal range or not returned as of this dictation. EMERGENCY DEPARTMENT COURSE and DIFFERENTIAL DIAGNOSIS/MDM:   Vitals:    Vitals:    01/14/22 0800 01/14/22 0815 01/14/22 0830 01/14/22 0845   BP: (!) 185/70 (!) 200/136 (!) 172/66 (!) 163/61   Pulse: 63 62 57 64   Resp: 15 20 17 12   Temp:       TempSrc:       SpO2: 93%              MDM  Number of Diagnoses or Management Options  Left arm pain  Lightheadedness  Near syncope  Diagnosis management comments: I did order repeat EKG normal sinus rhythm rate of 60 bpm.  Incomplete right bundle branch block which is unchanged from previous. No acute ST segment or T wave findings. No acute findings of ischemia or infarction. Repeat troponin 25.   Patient remains without any chest discomfort or shortness of breath at this time. She was wishing to be discharged home did speak with Dr. Leon Peralta her cardiologist.  After discussion with the patient and with Dr. Leon Peralta she will be discharged home we will follow-up with her office next week. Patient agreeable to this. ED return and follow-up instructions discussed prior to discharge.        Amount and/or Complexity of Data Reviewed  Decide to obtain previous medical records or to obtain history from someone other than the patient: yes        MIPS       REASSESSMENT     ED Course as of 01/25/22 0857   Thu Jan 13, 2022   1019 EKG 12 Lead  Performed at 59 sinus bradycardia with sinus arrhythmia-CA interval 0.168 QRS duration 0.084-QTc corrected 0.431 there is no ST segment elevation [RS]   1127 XR CHEST PORTABLE  X-ray chest no acute process radiologist read [RS]   1331 EKG 12 Lead  Performed at 1301-ventricular rate is 49, CA interval 0.158, QRS duration 0.098-QTc corrected 0.428 there is no evidence of any ST segment elevation [RS]   Fri Jan 14, 2022   1251 D-Dimer, Quantitative:    D-Dimer, Quant 0.34 [RS]   1251 Troponin(!):    Troponin, High Sensitivity 25(!)   Troponin T NOT REPORTED   Troponin Interp NOT REPORTED [RS]   9618 Basic Metabolic Panel(!):    Glucose 150(!)   BUN 19   Creatinine 0.65   Bun/Cre Ratio 29(!)   CALCIUM, SERUM, 066742 9.6   Sodium 140   Potassium 3.8   Chloride 103   CO2 25   Anion Gap 12   GFR Non- >60   GFR  >60   GFR Comment        GFR Staging      [RS]   1252 CBC Auto Differential(!):    WBC 5.3   RBC 4.96   Hemoglobin Quant 15.0   Hematocrit 45.2   MCV 91.1   MCH 30.2   MCHC 33.2   RDW 13.1   Platelet Count 027   MPV 10.8   NRBC Automated 0.0   Differential Type NOT REPORTED   Seg Neutrophils 47   Lymphocytes 35   Monocytes 10   Eosinophils % 7(!)   Basophils 1   Immature Granulocytes 0   Segs Absolute 2.51   Absolute Lymph # 1.82   Absolute Mono # 0.53   Absolute Eos # 0.35   Basophils Absolute 0.04 Absolute Immature Granulocyte <0.03   WBC Morphology NOT REPORTED   RBC Morphology NOT REPORTED   Platelet Estimate NOT REPORTED [RS]      ED Course User Index  [RS] Nabeel Steni MD         CRITICAL CARE TIME   Total Critical Care time was  minutes, excluding separately reportable procedures. There was a high probability of clinically significant/life threatening deterioration in the patient's condition which required my urgent intervention. CONSULTS:  None    PROCEDURES:  Unless otherwise noted below, none     Procedures        FINAL IMPRESSION      1. Left arm pain    2. Lightheadedness    3. Near syncope          DISPOSITION/PLAN   DISPOSITION Decision To Discharge 01/14/2022 12:25:10 PM      PATIENT REFERRED TO:  Adia Trotter MD  [place] [id number]  [phone]      Follow-up next week      DISCHARGE MEDICATIONS:  New Prescriptions    HYDROCODONE-ACETAMINOPHEN (NORCO) 5-325 MG PER TABLET    Take 1 tablet by mouth every 6 hours as needed for Pain for up to 3 days. Intended supply: 3 days. Take lowest dose possible to manage pain     Controlled Substances Monitoring:     No flowsheet data found.     (Please note that portions of this note were completed with a voice recognition program.  Efforts were made to edit the dictations but occasionally words are mis-transcribed.)    Claude Pineda MD (electronically signed)  Attending Emergency Physician             Claude Pineda MD  01/25/22 2049

## 2022-01-14 NOTE — ED NOTES
Pt given hs medications and denies further needs. Resting quietly in bed. Call light in reach.      [place], [address] [facility]  01/13/22 7071

## 2022-01-14 NOTE — ED NOTES
Pt is resting quietly in bed, no signs of distress noted. Will continue to monitor. Call light in reach.       [place], [address] [place] [address]  01/14/22 [id number]

## 2022-01-18 ENCOUNTER — HOSPITAL ENCOUNTER (OUTPATIENT)
Dept: GENERAL RADIOLOGY | Age: 77
Discharge: HOME | End: 2022-01-20
Payer: MEDICARE

## 2022-01-18 ENCOUNTER — OFFICE VISIT (OUTPATIENT)
Dept: CARDIOLOGY | Age: 77
End: 2022-01-18
Payer: MEDICARE

## 2022-01-18 ENCOUNTER — TELEPHONE (OUTPATIENT)
Dept: CARDIOLOGY | Age: 77
End: 2022-01-18

## 2022-01-18 ENCOUNTER — HOSPITAL ENCOUNTER (OUTPATIENT)
Age: 77
Discharge: HOME | End: 2022-01-20
Payer: MEDICARE

## 2022-01-18 VITALS
OXYGEN SATURATION: 93 % | WEIGHT: 188 LBS | HEIGHT: 60 IN | BODY MASS INDEX: 36.91 KG/M2 | RESPIRATION RATE: 18 BRPM | SYSTOLIC BLOOD PRESSURE: 174 MMHG | DIASTOLIC BLOOD PRESSURE: 87 MMHG | HEART RATE: 65 BPM

## 2022-01-18 DIAGNOSIS — M79.602 LEFT ARM PAIN: ICD-10-CM

## 2022-01-18 DIAGNOSIS — I48.0 PAROXYSMAL ATRIAL FIBRILLATION (HCC): ICD-10-CM

## 2022-01-18 DIAGNOSIS — I10 ESSENTIAL HYPERTENSION: Primary | ICD-10-CM

## 2022-01-18 DIAGNOSIS — M54.2 NECK PAIN: ICD-10-CM

## 2022-01-18 DIAGNOSIS — M25.511 ACUTE PAIN OF RIGHT SHOULDER: ICD-10-CM

## 2022-01-18 DIAGNOSIS — I21.4 NON-ST ELEVATION MYOCARDIAL INFARCTION (NSTEMI) (HCC): ICD-10-CM

## 2022-01-18 DIAGNOSIS — M25.512 ACUTE PAIN OF LEFT SHOULDER: ICD-10-CM

## 2022-01-18 DIAGNOSIS — E78.2 MIXED HYPERLIPIDEMIA: ICD-10-CM

## 2022-01-18 PROCEDURE — 4040F PNEUMOC VAC/ADMIN/RCVD: CPT | Performed by: PHYSICIAN ASSISTANT

## 2022-01-18 PROCEDURE — 99214 OFFICE O/P EST MOD 30 MIN: CPT | Performed by: PHYSICIAN ASSISTANT

## 2022-01-18 PROCEDURE — 1123F ACP DISCUSS/DSCN MKR DOCD: CPT | Performed by: PHYSICIAN ASSISTANT

## 2022-01-18 PROCEDURE — 1090F PRES/ABSN URINE INCON ASSESS: CPT | Performed by: PHYSICIAN ASSISTANT

## 2022-01-18 PROCEDURE — 1036F TOBACCO NON-USER: CPT | Performed by: PHYSICIAN ASSISTANT

## 2022-01-18 PROCEDURE — G8417 CALC BMI ABV UP PARAM F/U: HCPCS | Performed by: PHYSICIAN ASSISTANT

## 2022-01-18 PROCEDURE — G8427 DOCREV CUR MEDS BY ELIG CLIN: HCPCS | Performed by: PHYSICIAN ASSISTANT

## 2022-01-18 PROCEDURE — 72040 X-RAY EXAM NECK SPINE 2-3 VW: CPT

## 2022-01-18 PROCEDURE — 99211 OFF/OP EST MAY X REQ PHY/QHP: CPT

## 2022-01-18 PROCEDURE — G8400 PT W/DXA NO RESULTS DOC: HCPCS | Performed by: PHYSICIAN ASSISTANT

## 2022-01-18 PROCEDURE — G8484 FLU IMMUNIZE NO ADMIN: HCPCS | Performed by: PHYSICIAN ASSISTANT

## 2022-01-18 RX ORDER — LOSARTAN POTASSIUM AND HYDROCHLOROTHIAZIDE 12.5; 5 MG/1; MG/1
1 TABLET ORAL DAILY
Qty: 90 TABLET | Refills: 3 | Status: SHIPPED | OUTPATIENT
Start: 2022-01-18

## 2022-01-18 RX ORDER — METHYLPREDNISOLONE 4 MG/1
TABLET ORAL
Qty: 1 KIT | Refills: 0 | Status: SHIPPED | OUTPATIENT
Start: 2022-01-18 | End: 2022-05-25 | Stop reason: ALTCHOICE

## 2022-01-18 NOTE — PROGRESS NOTES
Ophelia Alexander am scribing for and in the presence of Haydee Avila PA-C     Patient: Frank Ferro  : 1945  Date of Visit: 2022    REASON FOR VISIT / CONSULTATION: Follow-up (Hx: CAD. ED f/u for near syncope, left arm pain, dizziness. she woke up thursday morning dizzy and her neck hurt down across her shoulders down to her left shoulder. she called 911 they did an EKG and had irregular heart rate. her BP was over 200 at that time. she was here until friday afternoon. was given norco at discharged. the pain lasted for another 5 days. she still has a lot of pain. she has numbness in her arm and hand. Denies: SOB, dizziness, lightheaded, palps)    History of Present Illness:        Dear Errol Carl, DO    I had the pleasure of seeing Frank Ferro in my office today. Ms. Mary Ann Carrillo is a 68 y.o. female with a history of recently diagnosied atherosclerotic heart disease including a NSTEMI in 2021 and atrial fibrillation. She came into the ER on 2021 due to chest pains and heart palpitations. She was then told she was having a heart attack at that time and was sent to [facility] in [place]. She had no previous heart history prior to this episode. She does have two sisters who have a history of atrial fibrillation. She did not know she was in atrial fibrillation at all when she came to the ER. Her chest discomfort started like a pressure feeling and then she felt like a heat sensation in her face and her pain did radiate into her back at that time as well. While at [facility] in [place] she did have a heart cath done and the picture is scanned into media. She did not need any stents at that time however her medications were changed of course at that time. The cardiologist in [place] did tell her the heart was strong. She did have an Echo done in [place] however she was never told about her heart strength.  She has been on Bumex for many years due to leg swelling in the past. EF 55-70% on 7/14/2021 echo. She had Holter monitor done on 10/11/2021 1. The rhythm was sinus. Average IN interval 0.16 average QRS duration 0.08. Intermittent atrial fibrillation for 6% (82  minutes) test duration. 2. No symptoms noted. Frequent PAC's and frequent PVC's. Echo performed 11/9/2021: Global left ventricular systolic function appears preserved with anestimated ejection fraction of 60%. The left ventricular cavity size is within normal limits and the left ventricular wall thickness is mildly increased. No definite specific wall motion abnormalities were identified. The left atrium is moderately dilated (34-39) with a left atrial volume index of 38 ml/m2. Moderate mitral annular calcification is seen with mild mitral regurgitation. Moderate tricuspid regurgitation. Moderate pulmonary hypertension with an estimated right ventricular systolic pressure of 46 mmHg. Evidence of moderate diastolic dysfunction is seen. Ms. Ayde Hopson is here today for a ED follow up. She said she woke up Thursday morning (1/13/2022) in severe pain. It was in her neck across her shoulders and down her left arm. She also had numbness in her left arm and hand in addition to having weakness. She did have constant dizziness that morning and it made her nauseous. Nothing seemed to improve her dizziness, no aggravating factors. She had no change in vision, no change in speech. No chest pain or pressure. No shortness of breath. She did have a fall Wednesday before [place], she reports hurting her tail bone but does not recall and injury to her neck or arms at that time. She has no previous shoulder issues or neck issues. She continues to have pain. She was given Norco in the ED and it only helped a little bit. She denies any swelling in her legs. No headaches, migraines, or visual changes noted. She also said her upper legs were causing her pain, this has since resolved. She denies any heart palpitations or lightheaded or dizziness.  She also denied any chest pain now or increased shortness of breath, abdominal pain, bleeding problems, problems with her medications or any other concerns at this time. She is eating and drinking okay. No cough, fever or chills. Blood work and imaging reviewed from ER on 1/13/2022: Troponin, BNP, BMP, CBC, D-dimer, and chest XR unremarkable    Bleeding Risks: Ms. Yusuf [place] denies any current or recent bleeding problems including a history of a GI bleed, ulcers, recent or upcoming surgeries, blood in her stool or black tarry stools or blood in her urine. PAST MEDICAL HISTORY:         Past Medical History:   Diagnosis Date    CAD (coronary artery disease)     Hyperlipidemia     Hypertension        CURRENT ALLERGIES: Patient has no known allergies. REVIEW OF SYSTEMS: 14 systems were reviewed. Pertinent positives and negatives as above, all else negative. Past Surgical History:   Procedure Laterality Date    HYSTERECTOMY      JOINT REPLACEMENT Bilateral     Social History:  Social History     Tobacco Use    Smoking status: Never Smoker    Smokeless tobacco: Never Used   Substance Use Topics    Alcohol use: Not Currently    Drug use: Never        CURRENT MEDICATIONS:        Outpatient Medications Marked as Taking for the 1/18/22 encounter (Office Visit) with Cj Edmonds PA-C   Medication Sig Dispense Refill    losartan-hydroCHLOROthiazide (HYZAAR) 50-12.5 MG per tablet Take 1 tablet by mouth daily 90 tablet 3    methylPREDNISolone (MEDROL, GERRY,) 4 MG tablet Take by mouth.  1 kit 0    Artificial Tear Ointment (DRY EYES OP) Apply 2 drops to eye as needed      aspirin 81 MG EC tablet Take 81 mg by mouth daily      calcium carbonate (OSCAL) 500 MG TABS tablet Take 600 mg by mouth 2 times daily       Multiple Vitamins-Minerals (CENTRUM SILVER 50+WOMEN PO) Take by mouth      docusate sodium (COLACE) 100 MG capsule Take 100 mg by mouth 2 times daily      rosuvastatin (CRESTOR) 20 MG tablet Take 1 tablet by mouth daily 90 tablet 3    metoprolol succinate (TOPROL XL) 50 MG extended release tablet Take 1 tablet by mouth daily 90 tablet 3    XARELTO 20 MG TABS tablet Take 20 mg by mouth daily (with breakfast)       sotalol (BETAPACE) 80 MG tablet Take 80 mg by mouth 2 times daily       bumetanide (BUMEX) 1 MG tablet Take 1.5 mg by mouth daily      omeprazole (PRILOSEC) 20 MG delayed release capsule Take 40 mg by mouth daily         FAMILY HISTORY: family history includes Atrial Fibrillation in her sister and sister; Cancer in her sister; Diabetes in her brother; Heart Attack in her father; Heart Disease in her brother. Physical Examination:     BP (!) 174/87 (Site: Right Upper Arm, Position: Sitting, Cuff Size: Large Adult)   Pulse 65   Resp 18   Ht 5' (1.524 m)   Wt 188 lb (85.3 kg)   SpO2 93%   BMI 36.72 kg/m²  Body mass index is 36.72 kg/m². Constitutional: She appeared oriented to person and place. She appears well-developed and well-nourished. In no acute distress. HEENT: Normocephalic and atraumatic. No JVD present. Carotid bruit is not present. No mass and no thyromegaly present. No lymphadenopathy noted. Cardiovascular: Normal rate, regular rhythm, normal heart sounds. Exam reveals no gallop and no friction rubs. 2/6 systolic murmur, 2nd intercostal space on the RIGHT just lateral to the sternum. Pulmonary/Chest: Effort normal and breath sounds normal. No respiratory distress. She has no wheezes, rhonchi or rales. Abdominal: Soft, non-tender. She exhibits no organomegaly, mass or bruit. Extremities: Edema- None. No cyanosis or clubbing. 2+ radial and carotid pulses. Distal extremity pulses: 2+ bilaterally. Compression stockings in place. Tender to palpation right lateral neck and bilateral shoulders. Decreased ROM neck lateral rotation. No signs of rotator cuff abnormalities throughout exam; negative empty can, Neer, fitch.   Neurological: Alertness and orientation as per Constitutional exam. No evidence of gross cranial nerve deficit. Coordination appeared normal.   Skin: Skin is warm and dry. There is no rash or diaphoresis. Psychiatric: She has a normal mood and affect. Her speech is normal and behavior is normal.      MOST RECENT LABS ON RECORD:   Lab Results   Component Value Date    WBC 5.3 01/13/2022    HGB 15.0 01/13/2022    HCT 45.2 01/13/2022     01/13/2022    ALT 42 (H) 07/13/2021    AST 29 07/13/2021     01/13/2022    K 3.8 01/13/2022     01/13/2022    CREATININE 0.65 01/13/2022    BUN 19 01/13/2022    CO2 25 01/13/2022    TSH 2.21 07/13/2021    INR 2.4 10/11/2021       ASSESSMENT:     1. Essential hypertension    2. Paroxysmal atrial fibrillation (HCC)    3. Non-ST elevation myocardial infarction (NSTEMI) ([facility] [address]    4. Mixed hyperlipidemia    5. Neck pain    6. Left arm pain    7. Acute pain of left shoulder    8. Acute pain of right shoulder       PLAN:      Essential Hypertension: Borderline controlled  Beta Blocker: Continue Metoprolol succinate (Toprol XL) 50 mg daily. ACE Inibitor/ARB: STOP losartan (Cozaar) and START losartan/HCTZ (Hyzaar) 50/12.5 mg every morning. I also discussed the potential side effects of this medication including lightheadedness and dizziness and instructed them to stop the medication of this occurs and call our office if this occurs. Calcium Channel Blocker: Not indicated at this time. Diuretics: Continue 1.5 mg daily. Educated the patient to call us in 1-2 weeks with an update of her blood pressure readings, we may increase the Hyzaar as appropriate. · Neck pain/ left/right shoulder and arm pain: fall in December but no known injury. · History of bilateral knee osteoarthritis. S/P right and left total knee arthoplasties  · I ordered a cervical spine XR to help guide further evaluation and treatment. · I did call in a medrol dose pack for her, instructed her on the dosage and usage, she does not have diabetes.   · She has 2 Norco pills left from her ER visit. Instructed her not to drive while taking her pain medication. Also discussed after her Norco prescription is used she may swith to Tylenol every 6 hours as needed for pain. · Follow up with [address] [place] [address].     Atherosclerotic Heart Disease: Recent NSTEMI as outlined above. Also S/P Heart Cath done on 7/14/2021 and no stents were needed at that time. Currently Stable at this time.  Antiplatelet Agent: Continue Aspirin 81 mg daily.  Beta Blocker: Continue Metoprolol succinate (Toprol XL) 50 mg daily.  Cholesterol Reduction Therapy: Continue rosuvastatin (Crestor) 20 mg daily.  Additional counseling: I advised them to call our office or go to the emergency room if they developed worsening or persistent chest pain or increased shortness of breath as this could be life threatening. · Paroxysmal Atrial Fibrillation: Rhythm Control Asymptomatic.  Beta Blocker: Continue Metoprolol succinate (Toprol XL) 50 mg daily.  Anti-Arrhythmic: sotalol (Betapace) 80 mg every 12 hours.: Monitoring: Since he will being maintained on dronedarone, I told them that we will need to closely monitor them for potential side effects. These include re-assessment of their ECG, LFTs and renal function. [id number] Score for Atrial Fibrillation Stroke Risk   Risk   Factors  Component Value   C CHF No 0   H HTN Yes 1   A2 Age >= 76 Yes,  (77 y.o.) 2   D DM No 0   S2 Prior Stroke/TIA No 0   V Vascular Disease Yes 1   A Age 74-69 No,  (77 y.o.) 0   Sc Sex female 1    [id number]  Score  5   Score last updated 1/5/24 07:01 AM EDT  Click here for a link to the UpToDate guideline \"Atrial Fibrillation: Anticoagulation therapy to prevent embolization  Disclaimer: Risk Score calculation is dependent on accuracy of patient problem list and past encounter diagnosis.     Stroke Risk: CHADS2-VASc Score: 4/9 (4% stroke risk)   Anticoagulation: Continue Riveroxaban (Xarelto): 20 mg once daily with largest meal (typically dinner). · Hyperlipidemia: Mixed  · Cholesterol Reduction Therapy: Continue rosuvastatin (Crestor) 20 mg daily. · If she continues to have neck/shoulder with an unclear etiology we could consider trying an alternative statin medication, however her arthralgia does not appear to be due to her statin medication at this time. · Ordered a cervical spine X: AP and lateral views    Finally, I recommended that she continue her current medications and follow up with you as previously scheduled. I discussed patient's symptoms and treatment plan with Dr Jose Abrams, he was in agreement with the plan and follow up. I also called and spoke with Stacie Francis DO, he is in agreement with the plan and will follow up Ms. Vera Lal  for further evaluation of her neck and shoulder pain. FOLLOW UP:   I told Ms. Vera Lal to call my office if she had any problems, but otherwise I asked her to Return in about 5 weeks (around 2/22/2022). However, I would be happy to see her sooner should the need arise. Sincerely,  Roark Sever [facility] Cardiology Specialist    [address]  Phone: [phone], Fax: [phone]     I I believe that the risk of significant morbidity and mortality related to the patient's current medical conditions are: Intermediate. Between 30 and 44 minutes were spent during prep work, discussion and exam of the patient, and follow up documentation and all of their questions were answered. The documentation recorded by the scribe, accurately and completely reflects the services I personally performed and the decisions made by me.  Sunita Gibson PA-C  January 18, 2022

## 2022-01-18 NOTE — PATIENT INSTRUCTIONS
SURVEY:    You may be receiving a survey from [employer] regarding your visit today. Please complete the survey to enable us to provide the highest quality of care to you and your family. If you cannot score us a very good on any question, please call the office to discuss how we could have made your experience a very good one. Thank you.

## 2022-01-18 NOTE — TELEPHONE ENCOUNTER
----- Message from Candy Cardoso PA-C sent at 1/18/2022  2:27 PM EST -----  Can we please send a copy of this report to [address] [place] [address]. Also please let patient know that there are mild degenerative changes noted but nothing acute. Please make sure they have contacted [address] [place] [address] for follow up. Thank sheldon minor

## 2022-01-27 ENCOUNTER — HOSPITAL ENCOUNTER (OUTPATIENT)
Dept: PHYSICAL THERAPY | Age: 77
Setting detail: THERAPIES SERIES
Discharge: HOME | End: 2022-01-27
Payer: MEDICARE

## 2022-01-27 ENCOUNTER — TELEPHONE (OUTPATIENT)
Dept: CARDIOLOGY | Age: 77
End: 2022-01-27

## 2022-01-27 PROCEDURE — G0283 ELEC STIM OTHER THAN WOUND: HCPCS

## 2022-01-27 PROCEDURE — 97140 MANUAL THERAPY 1/> REGIONS: CPT

## 2022-01-27 PROCEDURE — 97162 PT EVAL MOD COMPLEX 30 MIN: CPT

## 2022-01-27 RX ORDER — SOTALOL HYDROCHLORIDE 80 MG/1
80 TABLET ORAL 2 TIMES DAILY
Qty: 180 TABLET | Refills: 3 | Status: SHIPPED | OUTPATIENT
Start: 2022-01-27

## 2022-01-27 NOTE — TELEPHONE ENCOUNTER
Current BP lo-20-22  135/78  48  22  155/82  48  22  154/77  47  22  146/78  69  22  130/62  70  22  123/61  61  22  140/56  45    Pt denies any symptoms.

## 2022-01-28 NOTE — PROGRESS NOTES
Phone: [phone] [facility]          Fax: [phone]                      Outpatient Physical Therapy                                                                      Evaluation  Date: 2022  Patient: Naveed Curry  : 1945  CSN #: [id number]  Referring Practitioner: Bernabe Weber DO    Referral Date : 22     Medical Diagnosis: Cervical radiculopathy, M54.12, cervicalgia, M54.2    Treatment Diagnosis: Cervical radiculopathy, L shoulder pain, cervicalgia  Onset Date: 22  PT Insurance Information: Medicare B/[employer] supplement  Total # of Visits Approved: 8   Total # of Visits to Date: 1  No Show: 0  Canceled Appointment: 0     Subjective  Subjective: Patient reports she fell on 2021 injuring her tailbone and reporting it was the only place that hurt at the time. She reports her neck, L arm, and shoulder blade area was very painful when waking up on 2022. She reports continued pain that ranges from 0/10 at best to 6/10 at worst.  She reports reaching and lifting with the L UE increases pain. She reports decreased pain with medication. Additional Pertinent Hx: HTN, B TKA CAD    Objective     Observation/Palpation  Posture: Fair  Palpation: Tenderness of upper trap, proximal biceps, cervical paraspinals    Spine  Cervical: Flexion: 50% with pain radiating down her L shoulder, Extension: 50% with radiating pain to shoulder, R rotation: 80%, L rotation: 75% with pain radiating to L shoulder, chin tuck 50% with L shoulder pain.      Joint Mobility  Spine: Reproduction of symptoms with lower cervical PIVMS     Strength RUE  Strength RUE: WFL  Strength LUE  Strength LUE: Exception  L Shoulder Flexion: 2+/5  L Shoulder ABduction: 2+/5  L Shoulder Internal Rotation: 4+/5  L Elbow Flexion: 4/5  L Wrist Flexion: 4+/5  L Wrist Extension: 5/5    Additional Measures  Special Tests: +quadrant test      Exercises:  Exercise 1: HEP: posture education, posterior capsule stretch, upper trap stretch    Manual:  Manual traction: To cervical spine with reports of decreased symptoms. Soft Tissue Mobalization: Gentle STM to L upper trap (stopped due to increasing soreness/pain)    Modalities:  Moist heat: With IFC to decrease pain  E-stim (parameters): IFC with moist heat to decrease pain     Functional Outcome Measures  Pt disability report: 100% disabled    Assessment  Body structures, Functions, Activity limitations: Decreased functional mobility ,Decreased ROM,Decreased strength,Decreased endurance,Decreased posture,Increased pain,Decreased high-level IADLs  Assessment: The patient is a 68 y.o. female who reports a 2 week history of severe L sided neck pain with pain radiating to her L shoulder. She reports N&T of first digit and tingling down her L UE. She demonstrrates decreased cervical spine ROM with neck pain with flexion, extension, and L rotation. She also reported a reproduction of UE symptoms with asessment of lower cervical spine mobility assessment and with quadrant testing. She does demonstrate decreased L shoulder flexion and reported posterior shoulder pain when testing shoulder ROM. She would benefit from skilled PT to address her deficits to decrease pain and improve overall function. Prognosis: Fair        Decision Making: Medium Complexity    Patient Education  Patient Education: PT POC, tx rational, posture education  Pt verbalized/demonstrated good understanding:     [X] Yes         [] No, pt required further clarification.     Goals  Short term goals  Time Frame for Short term goals: 2 weeks  Short term goal 1: Patient will be initiated with a HEP  Short term goal 2: Patient will tolerate manual techniques and modalities to decrease pain    Long term goals  Time Frame for Long term goals : 4 weeks  Long term goal 1: Patient will be independent and compliant with a HEP  Long term goal 2: Patient will improve cervical spine flexion and extension ROM by 25% for ADLs  Long term goal 3: Patient will report decreased pain to <4/10 at worst and will report abolished N&T. Long term goal 4: Patient will improve L shoulder ROM to match R for ADLs  Long term goal 5: Patient will report 70% improvement in overall symptoms and function.       Patient goals : Decrease pain        Minutes Tracking:  Time In: 0242  Time Out: 4080  Minutes: 65  Timed Code Treatment Minutes: 948 Romulo Palmer DPT    1/27/2022

## 2022-01-28 NOTE — PLAN OF CARE
[facility]           Phone: [phone]             Outpatient Physical Therapy  Fax: [phone]                                           Date: 2022  Patient: Osmani Peacock : 1945 [facility] #: [id number]   Referring Practitioner:  Genesis Weber DO Referral Date:  22       [x] Plan of Care   [] Updated Plan of Care    Dates of Service to Include: 2022 to 22    Diagnosis:  Cervical radiculopathy, M54.12, cervicalgia, M54.2    Rehab (Treatment) Diagnosis:  Cervical radiculopathy, L shoulder pain, cervicalgia             Onset Date:  22    Attendance  Total # of Visits to Date: 1 No Show: 0 Canceled Appointment: 0    Assessment  Body structures, Functions, Activity limitations: Decreased functional mobility ,Decreased ROM,Decreased strength,Decreased endurance,Decreased posture,Increased pain,Decreased high-level IADLs  Assessment: The patient is a 68 y.o. female who reports a 2 week history of severe L sided neck pain with pain radiating to her L shoulder. She reports N&T of first digit and tingling down her L UE. She demonstrrates decreased cervical spine ROM with neck pain with flexion, extension, and L rotation. She also reported a reproduction of UE symptoms with asessment of lower cervical spine mobility assessment and with quadrant testing. She does demonstrate decreased L shoulder flexion and reported posterior shoulder pain when testing shoulder ROM. She would benefit from skilled PT to address her deficits to decrease pain and improve overall function.       Goals  Short term goals  Time Frame for Short term goals: 2 weeks  Short term goal 1: Patient will be initiated with a HEP  Short term goal 2: Patient will tolerate manual techniques and modalities to decrease pain  Long term goals  Time Frame for Long term goals : 4 weeks  Long term goal 1: Patient will be independent and compliant with a HEP  Long term goal 2: Patient will improve cervical spine flexion and extension ROM by 25% for ADLs  Long term goal 3: Patient will report decreased pain to <4/10 at worst and will report abolished N&T. Long term goal 4: Patient will improve L shoulder ROM to match R for ADLs  Long term goal 5: Patient will report 70% improvement in overall symptoms and function.      Prognosis  Prognosis: Fair    Treatment Plan   Times per week: 2  Plan weeks: 4  [x] HP/CP      [x] Electrical Stim   [x] Therapeutic Exercise      [x] Gait Training  [] Aquatics   [x] Ultrasound         [x] Patient Education/HEP   [x] Manual Therapy  [x] Traction    [x] Neuro-mahogany        [x] Soft Tissue Mobs            [] Home TENS  [] Iontophoresis    [] Orthotic casting/fitting      [x] Dry Needling             Electronically signed by: Mary Mora PT, DPT    Date: 1/27/2022      ______________________________________ Date: 1/27/2022   Physician Signature

## 2022-02-02 ENCOUNTER — HOSPITAL ENCOUNTER (OUTPATIENT)
Dept: PHYSICAL THERAPY | Age: 77
Setting detail: THERAPIES SERIES
Discharge: HOME | End: 2022-02-02
Payer: MEDICARE

## 2022-02-02 PROCEDURE — 97110 THERAPEUTIC EXERCISES: CPT

## 2022-02-02 PROCEDURE — 97140 MANUAL THERAPY 1/> REGIONS: CPT

## 2022-02-02 PROCEDURE — G0283 ELEC STIM OTHER THAN WOUND: HCPCS

## 2022-02-03 NOTE — PROGRESS NOTES
Phone: Anju           Fax: [phone]                           Outpatient Physical Therapy                                                                            Daily Note    Patient: Osmani Peacock : 1945  CSN #: [id number]   Referring Practitioner:  Genesis Weber DO    Referral Date : 22     Date: 2022    Diagnosis: Cervical radiculopathy, M54.12, cervicalgia, M54.2  Treatment Diagnosis: Cervical radiculopathy, L shoulder pain, cervicalgia    Onset Date: 22  PT Insurance Information: Medicare B/Aetna supplement  Total # of Visits Approved: 8 Per Physician Order  Total # of Visits to Date: 2  No Show: 0  Canceled Appointment: 0    Pre-Treatment Pain:  5/10  Subjective: Pt reports her shoulder blade is sore along with her L shoulder. Pt rates current pain a 4-5/10. Exercises:  Exercise 1: HEP: posture education, posterior capsule stretch, upper trap stretch  Exercise 2: UBE retro 4 mins  Exercise 3: GTB LAE/ Rows 10x ea  Exercise 4: UT/ LS stretch 3x30  Exercise 5: yes/ no (within comfort) 10x  Exercise 6: post cap stretch 3x30    Manual:  Manual traction: To cervical spine with reports of decreased symptoms. Soft Tissue Mobalization: Gentle STM to L upper trap (stopped due to increasing soreness/pain)    Modalities:  Moist heat: With IFC to decrease pain  E-stim (parameters): IFC with moist heat to decrease pain -Sitting     Assessment  Assessment: Initiated light postural support exercises today with fair tolerance noted. Continued use of manual techniques and modalities for pain relief. Will continue. Activity Tolerance  Activity Tolerance: Patient Tolerated treatment well    Patient Education  Patient Education: Cont current HEP. Pt verbalized/demonstrated good understanding:     [x] Yes         [] No, pt required further clarification.      Post Treatment Pain:  4/10    Plan  Times per week: 2  Plan weeks: 4    Goals (Total # of Visits to Date: 2)      Short term goals  Time Frame for Short term goals: 2 weeks  Short term goal 1: Patient will be initiated with a HEP  Short term goal 2: Patient will tolerate manual techniques and modalities to decrease pain -MET    Long term goals  Time Frame for Long term goals : 4 weeks  Long term goal 1: Patient will be independent and compliant with a HEP  Long term goal 2: Patient will improve cervical spine flexion and extension ROM by 25% for ADLs  Long term goal 3: Patient will report decreased pain to <4/10 at worst and will report abolished N&T. Long term goal 4: Patient will improve L shoulder ROM to match R for ADLs  Long term goal 5: Patient will report 70% improvement in overall symptoms and function.     Minutes Tracking:  Time In: 1860  Time Out: 3318  Minutes: 61  Timed Code Treatment Minutes: [address] [place], [place]          Date: 2/2/2022

## 2022-02-04 ENCOUNTER — HOSPITAL ENCOUNTER (OUTPATIENT)
Dept: PHYSICAL THERAPY | Age: 77
Setting detail: THERAPIES SERIES
Discharge: HOME | End: 2022-02-04
Payer: MEDICARE

## 2022-02-04 PROCEDURE — 97140 MANUAL THERAPY 1/> REGIONS: CPT

## 2022-02-04 PROCEDURE — G0283 ELEC STIM OTHER THAN WOUND: HCPCS

## 2022-02-04 PROCEDURE — 97110 THERAPEUTIC EXERCISES: CPT

## 2022-02-04 NOTE — PROGRESS NOTES
Phone: Anju           Fax: [phone]                           Outpatient Physical Therapy                                                                            Daily Note    Patient: Hannah Peñaloza : 1945  CSN #: [id number]   Referring Practitioner:  Severiano Eaton. Rioch, DO    Referral Date : 22     Date: 2022    Diagnosis: Cervical radiculopathy, M54.12, cervicalgia, M54.2  Treatment Diagnosis: Cervical radiculopathy, L shoulder pain, cervicalgia    Onset Date: 22  PT Insurance Information: Medicare B/Aetna supplement  Total # of Visits Approved: 8 Per Physician Order  Total # of Visits to Date: 3  No Show: 0  Canceled Appointment: 0    Pre-Treatment Pain:  3/10  Subjective: Pt states she feels like shes getting better. Pt rates current pain a 3/10. Exercises:  Exercise 1: HEP: posture education, posterior capsule stretch, upper trap stretch  Exercise 2: UBE retro 4 mins  Exercise 3: GTB LAE/ Rows 10x ea  Exercise 4: UT/ LS stretch 3x30  Exercise 5: yes/ no (within comfort) 10x  Exercise 6: post cap stretch 3x30  Exercise 7: 90/90 0# 15x ea    Manual:  Manual traction: To cervical spine with reports of decreased symptoms. Modalities:  Moist heat: With IFC to decrease pain  E-stim (parameters): IFC with moist heat to decrease pain -Sitting     Assessment  Assessment: Pt conitnues to report pain down LUE to about elbow and numbness to thumb. Continued postural support ex today with fair tolerance noted. Activity Tolerance  Activity Tolerance: Patient Tolerated treatment well    Patient Education  Patient Education: Cont current HEP. Pt verbalized/demonstrated good understanding:     [x] Yes         [] No, pt required further clarification.      Post Treatment Pain:  2/10    Plan  Times per week: 2  Plan weeks: 4    Goals  (Total # of Visits to Date: 3)      Short term goals  Time Frame for Short term goals: 2 weeks  Short term goal 1: Patient will be initiated with a HEP -MET  Short term goal 2: Patient will tolerate manual techniques and modalities to decrease pain -MET    Long term goals  Time Frame for Long term goals : 4 weeks  Long term goal 1: Patient will be independent and compliant with a HEP  Long term goal 2: Patient will improve cervical spine flexion and extension ROM by 25% for ADLs  Long term goal 3: Patient will report decreased pain to <4/10 at worst and will report abolished N&T. Long term goal 4: Patient will improve L shoulder ROM to match R for ADLs  Long term goal 5: Patient will report 70% improvement in overall symptoms and function.     Minutes Tracking:  Time In: 8049  Time Out: 8518  Minutes: 60  Timed Code Treatment Minutes: Hedy Falk [facility], [place]         Date: 2/4/2022

## 2022-02-08 ENCOUNTER — HOSPITAL ENCOUNTER (OUTPATIENT)
Dept: PHYSICAL THERAPY | Age: 77
Setting detail: THERAPIES SERIES
Discharge: HOME | End: 2022-02-08
Payer: MEDICARE

## 2022-02-08 PROCEDURE — G0283 ELEC STIM OTHER THAN WOUND: HCPCS

## 2022-02-08 PROCEDURE — 97110 THERAPEUTIC EXERCISES: CPT

## 2022-02-08 PROCEDURE — 97140 MANUAL THERAPY 1/> REGIONS: CPT

## 2022-02-09 NOTE — PROGRESS NOTES
Phone: Anju           Fax: [phone]                           Outpatient Physical Therapy                                                                            Daily Note    Patient: Jing Han : 1945  CSN #: [id number]   Referring Practitioner:  Jagdeep Zamora. DO Tia    Referral Date : 22     Date: 2022    Diagnosis: Cervical radiculopathy, M54.12, cervicalgia, M54.2  Treatment Diagnosis: Cervical radiculopathy, L shoulder pain, cervicalgia    Onset Date: 22  PT Insurance Information: Medicare B/Aetna supplement  Total # of Visits Approved: 8 Per Physician Order  Total # of Visits to Date: 4  No Show: 0  Canceled Appointment: 0    Pre-Treatment Pain:  2/10  Subjective: Pt reports she is feeling so much better. Pt states she still has some pain on her L post arm but shes been able to really cut back on pain pills. Pt rates current pain a 2/10. Exercises:  Exercise 1: HEP: posture education, posterior capsule stretch, upper trap stretch  Exercise 2: UBE retro 4 mins  Exercise 3: GTB LAE/ Rows 10x ea  Exercise 4: UT/ LS stretch 3x30  Exercise 5: yes/ no (within comfort) 10x  Exercise 6: post cap stretch 3x30  Exercise 7: 90/90 0# 15x ea    Manual:  Manual traction: To cervical spine with reports of decreased symptoms. Modalities:  Moist heat: With IFC to decrease pain  E-stim (parameters): IFC with moist heat to decrease pain -Sitting     Assessment  Assessment: 75% to full cervical roatation noted today passively. Symptoms overall have decreased with less need of pain pills for comfort. Activity Tolerance  Activity Tolerance: Patient Tolerated treatment well    Patient Education  Patient Education: Cont current HEP. Pt verbalized/demonstrated good understanding:     [x] Yes         [] No, pt required further clarification.      Post Treatment Pain:  2/10    Plan  Times per week: 2  Plan weeks: 4    Goals  (Total # of Visits to Date: 4)      Short term goals  Time Frame for Short term goals: 2 weeks  Short term goal 1: Patient will be initiated with a HEP -MET  Short term goal 2: Patient will tolerate manual techniques and modalities to decrease pain -MET    Long term goals  Time Frame for Long term goals : 4 weeks  Long term goal 1: Patient will be independent and compliant with a HEP  Long term goal 2: Patient will improve cervical spine flexion and extension ROM by 25% for ADLs  Long term goal 3: Patient will report decreased pain to <4/10 at worst and will report abolished N&T. Long term goal 4: Patient will improve L shoulder ROM to match R for ADLs  Long term goal 5: Patient will report 70% improvement in overall symptoms and function.     Minutes Tracking:  Time In: 1402  Time Out: 1500  Minutes: 58  Timed Code Treatment Minutes: [address]         Date: 2/8/2022

## 2022-02-10 ENCOUNTER — HOSPITAL ENCOUNTER (OUTPATIENT)
Dept: PHYSICAL THERAPY | Age: 77
Setting detail: THERAPIES SERIES
Discharge: HOME | End: 2022-02-10
Payer: MEDICARE

## 2022-02-10 PROCEDURE — G0283 ELEC STIM OTHER THAN WOUND: HCPCS

## 2022-02-10 PROCEDURE — 97140 MANUAL THERAPY 1/> REGIONS: CPT

## 2022-02-10 PROCEDURE — 97110 THERAPEUTIC EXERCISES: CPT

## 2022-02-11 NOTE — PROGRESS NOTES
Phone: Anju           Fax: [phone]                           Outpatient Physical Therapy                                                                            Daily Note    Patient: Andres Davey : 1945  CSN #: [id number]   Referring Practitioner:  Viv Weber DO    Referral Date : 22     Date: 2/10/2022    Diagnosis: Cervical radiculopathy, M54.12, cervicalgia, M54.2  Treatment Diagnosis: Cervical radiculopathy, L shoulder pain, cervicalgia    Onset Date: 22  PT Insurance Information: Medicare B/Aetna supplement  Total # of Visits Approved: 8 Per Physician Order  Total # of Visits to Date: 5  Canceled Appointment: 0      Pre-Treatment Pain:  2/10  Subjective: Patient reports she hasn't needed any pain medication today. She reports 2/10 posterior shoulder pain coming into therapy today. Exercises:  Exercise 2: UBE retro 4 mins  Exercise 3: GTB LAE/ Rows 10x2 ea  Exercise 6: post cap stretch 3x30  Exercise 7: 90/90 0# 15x ea  Exercise 8: Therapy ball roll    Manual:  Manual traction: To cervical spine with reports of decreased symptoms. Soft Tissue Mobalization: Heated thermoprobe to teres muscle group    Modalities:  Moist heat: With IFC to decrease pain  E-stim (parameters): IFC with moist heat to decrease pain -Sitting     Assessment  Body structures, Functions, Activity limitations: Decreased functional mobility ,Decreased ROM,Decreased strength,Decreased endurance,Decreased posture,Increased pain,Decreased high-level IADLs  Assessment: Progressed reps with exercises to work toward her strength goals. Used heated thermoprobe and IFC to decrease pain. Activity Tolerance  Activity Tolerance: Patient Tolerated treatment well    Patient Education  Patient Education: Cont current HEP. Pt verbalized/demonstrated good understanding:     [x] Yes         [] No, pt required further clarification.     Post Treatment Pain:

## 2022-02-16 ENCOUNTER — HOSPITAL ENCOUNTER (OUTPATIENT)
Dept: PHYSICAL THERAPY | Age: 77
Setting detail: THERAPIES SERIES
Discharge: HOME | End: 2022-02-16
Payer: MEDICARE

## 2022-02-16 PROCEDURE — 97110 THERAPEUTIC EXERCISES: CPT

## 2022-02-16 PROCEDURE — 97140 MANUAL THERAPY 1/> REGIONS: CPT

## 2022-02-16 PROCEDURE — G0283 ELEC STIM OTHER THAN WOUND: HCPCS

## 2022-02-17 ENCOUNTER — OFFICE VISIT (OUTPATIENT)
Dept: CARDIOLOGY | Age: 77
End: 2022-02-17
Payer: MEDICARE

## 2022-02-17 VITALS
DIASTOLIC BLOOD PRESSURE: 77 MMHG | WEIGHT: 186.6 LBS | SYSTOLIC BLOOD PRESSURE: 148 MMHG | RESPIRATION RATE: 18 BRPM | BODY MASS INDEX: 36.63 KG/M2 | HEIGHT: 60 IN | OXYGEN SATURATION: 96 % | HEART RATE: 64 BPM

## 2022-02-17 DIAGNOSIS — E78.2 MIXED HYPERLIPIDEMIA: ICD-10-CM

## 2022-02-17 DIAGNOSIS — I25.10 CORONARY ARTERY DISEASE INVOLVING NATIVE CORONARY ARTERY OF NATIVE HEART WITHOUT ANGINA PECTORIS: ICD-10-CM

## 2022-02-17 DIAGNOSIS — M54.2 NECK PAIN: ICD-10-CM

## 2022-02-17 DIAGNOSIS — I10 ESSENTIAL HYPERTENSION: ICD-10-CM

## 2022-02-17 DIAGNOSIS — I48.0 PAROXYSMAL ATRIAL FIBRILLATION (HCC): ICD-10-CM

## 2022-02-17 PROCEDURE — 99211 OFF/OP EST MAY X REQ PHY/QHP: CPT | Performed by: PHYSICIAN ASSISTANT

## 2022-02-17 PROCEDURE — G8484 FLU IMMUNIZE NO ADMIN: HCPCS | Performed by: PHYSICIAN ASSISTANT

## 2022-02-17 PROCEDURE — G8417 CALC BMI ABV UP PARAM F/U: HCPCS | Performed by: PHYSICIAN ASSISTANT

## 2022-02-17 PROCEDURE — G8400 PT W/DXA NO RESULTS DOC: HCPCS | Performed by: PHYSICIAN ASSISTANT

## 2022-02-17 PROCEDURE — 1036F TOBACCO NON-USER: CPT | Performed by: PHYSICIAN ASSISTANT

## 2022-02-17 PROCEDURE — G8427 DOCREV CUR MEDS BY ELIG CLIN: HCPCS | Performed by: PHYSICIAN ASSISTANT

## 2022-02-17 PROCEDURE — 99213 OFFICE O/P EST LOW 20 MIN: CPT | Performed by: PHYSICIAN ASSISTANT

## 2022-02-17 RX ORDER — LOSARTAN POTASSIUM AND HYDROCHLOROTHIAZIDE 25; 100 MG/1; MG/1
1 TABLET ORAL DAILY
Qty: 90 TABLET | Refills: 1 | Status: CANCELLED | OUTPATIENT
Start: 2022-02-17

## 2022-02-17 NOTE — PATIENT INSTRUCTIONS
SURVEY:    You may be receiving a survey from [employer] regarding your visit today. Please complete the survey to enable us to provide the highest quality of care to you and your family. If you cannot score us a very good on any question, please call the office to discuss how we could have made your experience a very good one. Thank you.

## 2022-02-17 NOTE — PROGRESS NOTES
Delvis Apodaca am scribing for and in the presence of Michael Aguilar PA-C     Patient: Jl Carrillo  : 1945  Date of Visit: 2022    REASON FOR VISIT / CONSULTATION: Follow-up (Hx: HTN, neck pain, ASHD, PAF, HLD. pt is here for 5 week f/u. review bps, doing okay. found out her neck and chest pain was a pinched nerve, going to PT. Denies: CP, SOB, dizziness, lightehaded, palps )    History of Present Illness:        Dear Cony Cramer, DO    I had the pleasure of seeing Jl Carrillo in my office today. Ms. Juvencio Hernandez is a 68 y.o. female with a history of recently diagnosied atherosclerotic heart disease including a NSTEMI in 2021 and atrial fibrillation. She came into the ER on 2021 due to chest pains and heart palpitations. She was then told she was having a heart attack at that time and was sent to [facility] in [place]. She had no previous heart history prior to this episode. She does have two sisters who have a history of atrial fibrillation. She did not know she was in atrial fibrillation at all when she came to the ER. Her chest discomfort started like a pressure feeling and then she felt like a heat sensation in her face and her pain did radiate into her back at that time as well. While at [facility] in [place] she did have a heart cath done and the picture is scanned into media. She did not need any stents at that time however her medications were changed of course at that time. The cardiologist in [place] did tell her the heart was strong. She did have an Echo done in [place] however she was never told about her heart strength. She has been on Bumex for many years due to leg swelling in the past. EF 55-70% on 2021 echo. She had Holter monitor done on 10/11/2021 1. The rhythm was sinus. Average NE interval 0.16 average QRS duration 0.08. Intermittent atrial fibrillation for 6% (82  minutes) test duration. 2. No symptoms noted. Frequent PAC's and frequent PVC's. Echo performed 11/9/2021: Global left ventricular systolic function appears preserved with anestimated ejection fraction of 60%. The left ventricular cavity size is within normal limits and the left ventricular wall thickness is mildly increased. No definite specific wall motion abnormalities were identified. The left atrium is moderately dilated (34-39) with a left atrial volume index of 38 ml/m2. Moderate mitral annular calcification is seen with mild mitral regurgitation. Moderate tricuspid regurgitation. Moderate pulmonary hypertension with an estimated right ventricular systolic pressure of 46 mmHg. Evidence of moderate diastolic dysfunction is seen. Ms. Rachelle Mckeon is here today for a follow up. She is doing much better since her last visit. She is going to physical therapy for a pinched nerve in her neck and her arm pain has almost completely resolved. She denies any heart palpitations or lightheaded or dizziness. She also denied any chest pain now or increased shortness of breath, abdominal pain, bleeding problems, problems with her medications or any other concerns at this time. She is eating and drinking okay. No cough, fever or chills. Bleeding Risks: Ms. Rachelle Mckeon denies any current or recent bleeding problems including a history of a GI bleed, ulcers, recent or upcoming surgeries, blood in her stool or black tarry stools or blood in her urine. PAST MEDICAL HISTORY:         Past Medical History:   Diagnosis Date    CAD (coronary artery disease)     Hyperlipidemia     Hypertension        CURRENT ALLERGIES: Patient has no known allergies. REVIEW OF SYSTEMS: 14 systems were reviewed. Pertinent positives and negatives as above, all else negative.      Past Surgical History:   Procedure Laterality Date    HYSTERECTOMY      JOINT REPLACEMENT Bilateral     Social History:  Social History     Tobacco Use    Smoking status: Never Smoker    Smokeless tobacco: Never Used   Substance Use Topics    Alcohol use: Not Currently    Drug use: Never        CURRENT MEDICATIONS:        Outpatient Medications Marked as Taking for the 2/17/22 encounter (Office Visit) with Whit Roach PA-C   Medication Sig Dispense Refill    sotalol (BETAPACE) 80 MG tablet Take 1 tablet by mouth 2 times daily 180 tablet 3    losartan-hydroCHLOROthiazide (HYZAAR) 50-12.5 MG per tablet Take 1 tablet by mouth daily 90 tablet 3    Artificial Tear Ointment (DRY EYES OP) Apply 2 drops to eye as needed      aspirin 81 MG EC tablet Take 81 mg by mouth daily      calcium carbonate (OSCAL) 500 MG TABS tablet Take 600 mg by mouth 2 times daily       Multiple Vitamins-Minerals (CENTRUM SILVER 50+WOMEN PO) Take by mouth      docusate sodium (COLACE) 100 MG capsule Take 100 mg by mouth 2 times daily      rosuvastatin (CRESTOR) 20 MG tablet Take 1 tablet by mouth daily 90 tablet 3    metoprolol succinate (TOPROL XL) 50 MG extended release tablet Take 1 tablet by mouth daily 90 tablet 3    XARELTO 20 MG TABS tablet Take 20 mg by mouth daily (with breakfast)       bumetanide (BUMEX) 1 MG tablet Take 1.5 mg by mouth daily      omeprazole (PRILOSEC) 20 MG delayed release capsule Take 40 mg by mouth daily         FAMILY HISTORY: family history includes Atrial Fibrillation in her sister and sister; Cancer in her sister; Diabetes in her brother; Heart Attack in her father; Heart Disease in her brother. Physical Examination:     BP (!) 148/77 (Site: Left Upper Arm, Position: Sitting, Cuff Size: Large Adult)   Pulse 64   Resp 18   Ht 5' (1.524 m)   Wt 186 lb 9.6 oz (84.6 kg)   SpO2 96%   BMI 36.44 kg/m²  Body mass index is 36.44 kg/m². Constitutional: She appeared oriented to person and place. She appears well-developed and well-nourished. In no acute distress. HEENT: Normocephalic and atraumatic. No JVD present. Carotid bruit is not present. No mass and no thyromegaly present. No lymphadenopathy noted.   Cardiovascular: Normal rate, regular rhythm, normal heart sounds. Exam reveals no gallop and no friction rubs. 2/6 systolic murmur, 2nd intercostal space on the RIGHT just lateral to the sternum. Pulmonary/Chest: Effort normal and breath sounds normal. No respiratory distress. She has no wheezes, rhonchi or rales. Abdominal: Soft, non-tender. She exhibits no organomegaly, mass or bruit. Extremities: Edema- None. No cyanosis or clubbing. 2+ radial and carotid pulses. Distal extremity pulses: 2+ bilaterally. Compression stockings in place. Neurological: Alertness and orientation as per Constitutional exam. No evidence of gross cranial nerve deficit. Coordination appeared normal.   Skin: Skin is warm and dry. There is no rash or diaphoresis. Psychiatric: She has a normal mood and affect. Her speech is normal and behavior is normal.      MOST RECENT LABS ON RECORD:   Lab Results   Component Value Date    WBC 5.3 01/13/2022    HGB 15.0 01/13/2022    HCT 45.2 01/13/2022     01/13/2022    ALT 42 (H) 07/13/2021    AST 29 07/13/2021     01/13/2022    K 3.8 01/13/2022     01/13/2022    CREATININE 0.65 01/13/2022    BUN 19 01/13/2022    CO2 25 01/13/2022    TSH 2.21 07/13/2021    INR 2.4 10/11/2021       ASSESSMENT:     1. Essential hypertension    2. Neck pain    3. Coronary artery disease involving native coronary artery of native heart without angina pectoris    4. Paroxysmal atrial fibrillation (HCC)    5. Mixed hyperlipidemia       PLAN:      Essential Hypertension: Controlled  · Beta Blocker: Continue Metoprolol succinate (Toprol XL) 50 mg daily. · ACE Inibitor/ARB: Continue losartan/HCTZ (Hyzaar) 50/12.5 mg every morning. · Calcium Channel Blocker: Not indicated at this time. · Diuretics: Continue bumetanide (Bumex) 1.5 mg daily. · Neck pain/ left/right shoulder and arm pain:   · Continue follow up with Physical Therapy     Atherosclerotic Heart Disease: Recent NSTEMI as outlined above.  Also S/P Heart Cath done on 7/14/2021 and no stents were needed at that time. Currently Stable at this time.  Antiplatelet Agent: Continue Aspirin 81 mg daily.  Beta Blocker: Continue Metoprolol succinate (Toprol XL) 50 mg daily.  Cholesterol Reduction Therapy: Continue rosuvastatin (Crestor) 20 mg daily.  Additional counseling: I advised them to call our office or go to the emergency room if they developed worsening or persistent chest pain or increased shortness of breath as this could be life threatening. · Paroxysmal Atrial Fibrillation: Rhythm Control Asymptomatic.  Beta Blocker: Continue Metoprolol succinate (Toprol XL) 50 mg daily.  Anti-Arrhythmic: sotalol (Betapace) 80 mg every 12 hours.: Monitoring: Since he will being maintained on dronedarone, I told them that we will need to closely monitor them for potential side effects. These include re-assessment of their ECG, LFTs and renal function. [id number] Score for Atrial Fibrillation Stroke Risk   Risk   Factors  Component Value   C CHF No 0   H HTN Yes 1   A2 Age >= 76 Yes,  (77 y.o.) 2   D DM No 0   S2 Prior Stroke/TIA No 0   V Vascular Disease Yes 1   A Age 74-69 No,  (77 y.o.) 0   Sc Sex female 1    [id number]  Score  5   Score last updated 9/8/96 18:53 AM EDT  Click here for a link to the UpToDate guideline \"Atrial Fibrillation: Anticoagulation therapy to prevent embolization  Disclaimer: Risk Score calculation is dependent on accuracy of patient problem list and past encounter diagnosis.  Stroke Risk: CHADS2-VASc Score: 4/9 (4% stroke risk)   Anticoagulation: Continue Riveroxaban (Xarelto): 20 mg once daily with largest meal (typically dinner). · Hyperlipidemia: Mixed  · Cholesterol Reduction Therapy: Continue rosuvastatin (Crestor) 20 mg daily. Finally, I recommended that she continue her current medications and follow up with you as previously scheduled. FOLLOW UP:   I told Ms. Erika Rebollar to call my office if she had any problems, but otherwise I asked her to Return in about 3 months (around 5/17/2022). However, I would be happy to see her sooner should the need arise. Sincerely,  Fish Pagan   [facility] Cardiology Specialist    [address], [place], [address] [place] [address]  Phone: [phone], Fax: [phone]     I believe that the risk of significant morbidity and mortality related to the patient's current medical conditions are: low-intermediate. Between 15-29 minutes were spent during prep work, discussion and exam of the patient, and follow up documentation and all of their questions were answered. The documentation recorded by the scribe, accurately and completely reflects the services I personally performed and the decisions made by me.  Cj Edmonds PA-C February 17, 2022

## 2022-02-17 NOTE — PROGRESS NOTES
Phone: Anju           Fax: [phone]                           Outpatient Physical Therapy                                                                            Daily Note    Patient: Adilene Jones : 1945  CSN #: [id number]   Referring Practitioner:  Michelle Weber DO    Referral Date : 22     Date: 2022    Diagnosis: Cervical radiculopathy, M54.12, cervicalgia, M54.2  Treatment Diagnosis: Cervical radiculopathy, L shoulder pain, cervicalgia    Onset Date: 22  PT Insurance Information: Medicare B/Aetna supplement  Total # of Visits Approved: 8 Per Physician Order  Total # of Visits to Date: 6  No Show: 0  Canceled Appointment: 0    Pre-Treatment Pain:  2/10  Subjective: Pt states she has been able to stop her pain meds and feels like shes making improvements. Pt rates current pain a 1-2/10. Exercises:  Exercise 2: UBE retro 6 mins  Exercise 3: BTB LAE/ Rows 10x2 ea  Exercise 6: post cap stretch 3x30  Exercise 7: 90/90 0# 15x ea  Exercise 8: Therapy ball roll  Exercise 9: OTB 10x HAB/ diags  Exercise 10: shrugs/ rolls/ curls 3# 15x    Manual:  Manual traction: To cervical spine with reports of decreased symptoms. Modalities:  Moist heat: With IFC to decrease pain  E-stim (parameters): IFC with moist heat to decrease pain -Sitting     Assessment  Assessment: Advanced postural strengthening ex today with fair tolerance noted. Continued to educate Pt on importance of posture throughout day to decrease stress placed on body d/t poor posture. Activity Tolerance  Activity Tolerance: Patient Tolerated treatment well    Patient Education  Patient Education: Cont current HEP. Pt verbalized/demonstrated good understanding:     [x] Yes         [] No, pt required further clarification.      Post Treatment Pain:  2/10    Plan  Times per week: 2  Plan weeks: 4    Goals  (Total # of Visits to Date: 6)      Short term goals  Time Frame for Short term goals: 2 weeks  Short term goal 1: Patient will be initiated with a HEP -MET  Short term goal 2: Patient will tolerate manual techniques and modalities to decrease pain -MET    Long term goals  Time Frame for Long term goals : 4 weeks  Long term goal 1: Patient will be independent and compliant with a HEP  Long term goal 2: Patient will improve cervical spine flexion and extension ROM by 25% for ADLs  Long term goal 3: Patient will report decreased pain to <4/10 at worst and will report abolished N&T. Long term goal 4: Patient will improve L shoulder ROM to match R for ADLs  Long term goal 5: Patient will report 70% improvement in overall symptoms and function.     Minutes Tracking:  Time In: 1616  Time Out: 9380  Minutes: 59  Timed Code Treatment Minutes: Hedy Falk [facility], [place]         Date: 2/16/2022

## 2022-02-18 ENCOUNTER — HOSPITAL ENCOUNTER (OUTPATIENT)
Dept: PHYSICAL THERAPY | Age: 77
Setting detail: THERAPIES SERIES
Discharge: HOME | End: 2022-02-18
Payer: MEDICARE

## 2022-02-18 PROCEDURE — 97110 THERAPEUTIC EXERCISES: CPT

## 2022-02-18 PROCEDURE — 97140 MANUAL THERAPY 1/> REGIONS: CPT

## 2022-02-18 PROCEDURE — G0283 ELEC STIM OTHER THAN WOUND: HCPCS

## 2022-02-23 ENCOUNTER — HOSPITAL ENCOUNTER (OUTPATIENT)
Dept: PHYSICAL THERAPY | Age: 77
Setting detail: THERAPIES SERIES
Discharge: HOME | End: 2022-02-23
Payer: MEDICARE

## 2022-02-23 PROCEDURE — 97140 MANUAL THERAPY 1/> REGIONS: CPT

## 2022-02-23 PROCEDURE — 97110 THERAPEUTIC EXERCISES: CPT

## 2022-02-23 PROCEDURE — G0283 ELEC STIM OTHER THAN WOUND: HCPCS

## 2022-02-24 NOTE — PROGRESS NOTES
Phone: Anju           Fax: [phone]                           Outpatient Physical Therapy                                                                            Daily Note    Patient: Kev Ponce : 1945  CSN #: [id number]   Referring Practitioner:  Honey Weber DO    Referral Date : 22     Date: 2022    Diagnosis: Cervical radiculopathy, M54.12, cervicalgia, M54.2  Treatment Diagnosis: Cervical radiculopathy, L shoulder pain, cervicalgia    Onset Date: 22  PT Insurance Information: Medicare B/Aetna supplement  Total # of Visits Approved: 8 Per Physician Order  Total # of Visits to Date: 8  No Show: 0  Canceled Appointment: 0    Pre-Treatment Pain:  0/10  Subjective: Pt states the pain is pretty much gone but she still has just a little numbness in her forearm and thumb. Pt denies pain rating. Exercises:  Exercise 1: HEP: posture education, posterior capsule stretch, upper trap stretch  Exercise 2: UBE 4/4 mins  Exercise 3: BTB LAE/ Rows 10x2 ea  Exercise 7: 90/90 1# 15x ea  Exercise 8: Therapy ball roll  Exercise 9: OTB 10x HAB/ diags  Exercise 10: shrugs/ rolls/ curls 3# 15x    Manual:  Manual traction: To cervical spine with reports of decreased symptoms. Modalities:  Moist heat: With IFC to decrease pain  E-stim (parameters): IFC with moist heat to decrease pain -Sitting     Assessment  Assessment: Increased CROM noted since eval with less pain noted. Continued working on Otf shoulder and scap stability program with fair tolerance. Activity Tolerance  Activity Tolerance: Patient Tolerated treatment well    Patient Education  Patient Education: Cont current HEP. Pt verbalized/demonstrated good understanding:     [x] Yes         [] No, pt required further clarification.      Post Treatment Pain:  0/10    Plan  Times per week: 2  Plan weeks: 4    Goals  (Total # of Visits to Date: 8)      Short term goals  Time Frame for Short term goals: 2 weeks  Short term goal 1: Patient will be initiated with a HEP -MET  Short term goal 2: Patient will tolerate manual techniques and modalities to decrease pain -MET    Long term goals  Time Frame for Long term goals : 4 weeks  Long term goal 1: Patient will be independent and compliant with a HEP  Long term goal 2: Patient will improve cervical spine flexion and extension ROM by 25% for ADLs  Long term goal 3: Patient will report decreased pain to <4/10 at worst and will report abolished N&T. Long term goal 4: Patient will improve L shoulder ROM to match R for ADLs  Long term goal 5: Patient will report 70% improvement in overall symptoms and function.     Minutes Tracking:  Time In: 0040  Time Out: 1201  Minutes: 58  Timed Code Treatment Minutes: [address], [place]         Date: 2/23/2022

## 2022-02-25 ENCOUNTER — HOSPITAL ENCOUNTER (OUTPATIENT)
Dept: PHYSICAL THERAPY | Age: 77
Setting detail: THERAPIES SERIES
Discharge: HOME | End: 2022-02-25
Payer: MEDICARE

## 2022-02-25 PROCEDURE — 97110 THERAPEUTIC EXERCISES: CPT

## 2022-04-07 RX ORDER — RIVAROXABAN 20 MG/1
20 TABLET, FILM COATED ORAL
Qty: 90 TABLET | Refills: 3 | Status: SHIPPED | OUTPATIENT
Start: 2022-04-07

## 2022-04-15 NOTE — DISCHARGE SUMMARY
Phone: Anju          Fax: [phone]                            Outpatient Physical Therapy                                                                    Discharge Summary    Patient: Hannah Peñaloza  : 1945  [facility] #: [id number]   Referring physician: No admitting provider for patient encounter. Referring Practitioner: Severiano Eaton. DO Tia      Diagnosis: Cervical radiculopathy, M54.12, cervicalgia, M54.2      Date Treatment Initiated: 22  Date of Last Treatment: 22      PT Visit Information  Onset Date: 22  PT Insurance Information: Medicare B/Aetna supplement  Total # of Visits Approved: 8  Total # of Visits to Date: 9  Plan of Care/Certification Expiration Date: 22  No Show: 0  Canceled Appointment: 0  Progress Note Counter: RTD: a couple of weeks      Frequency/Duration  2 times per week  4 weeks      Treatment Received  [x] HP/CP      [x] Electrical Stim   [x] Therapeutic Exercise      [] Gait Training  [] Aquatics   [] Ultrasound         [x] Patient Education/HEP   [x] Manual Therapy  [x] Traction    [x] Neuro-mahogany        [x] Soft Tissue Mobs            [] Home TENS  [] Iontophoresis    [] Orthotic casting/fitting      [] Dry Needling    Assessment  Assessment: Patient reports she is 90% better overall. She reports a little numbness in her forearm, but denies pain over the past week. [facility], she demonstrates improved L shoulder ROM and strength. She is currently independent with a HEP and will be discharged at this time.     Goals  Short term goals  Time Frame for Short term goals: 2 weeks  Short term goal 1: Patient will be initiated with a HEP -MET  Short term goal 2: Patient will tolerate manual techniques and modalities to decrease pain -MET    Long term goals  Time Frame for Long term goals : 4 weeks  Long term goal 1: Patient will be independent and compliant with a HEP -MET  Long term goal 2: Patient will improve cervical spine flexion and extension ROM by 25% for ADLs -MET  Long term goal 3: Patient will report decreased pain to <4/10 at worst and will report abolished N&T -PARTIALLY MET: she denies pain, but reports numbness in her forearm that can extend to her thumb.   Long term goal 4: Patient will improve L shoulder ROM to match R for ADLs  Long term goal 5: Patient will report 70% improvement in overall symptoms and function -MET: she reports she is 90% better overall      Reason for Discharge  [x] Goals Achieved                        []  Poor Follow Through/Attendance                  []  Optimal Function Achieved     []  Patient Discharged Self    []  Hospitalization                         []  Physician discharge      Thank you for this referral      Brian Ren, PT, DPT               Date: 4/15/2022

## 2022-05-25 ENCOUNTER — OFFICE VISIT (OUTPATIENT)
Dept: CARDIOLOGY | Age: 77
End: 2022-05-25
Payer: MEDICARE

## 2022-05-25 VITALS
SYSTOLIC BLOOD PRESSURE: 145 MMHG | OXYGEN SATURATION: 96 % | WEIGHT: 188 LBS | DIASTOLIC BLOOD PRESSURE: 76 MMHG | BODY MASS INDEX: 36.91 KG/M2 | HEART RATE: 57 BPM | RESPIRATION RATE: 18 BRPM | HEIGHT: 60 IN

## 2022-05-25 DIAGNOSIS — I25.10 CORONARY ARTERY DISEASE INVOLVING NATIVE CORONARY ARTERY OF NATIVE HEART WITHOUT ANGINA PECTORIS: Primary | ICD-10-CM

## 2022-05-25 DIAGNOSIS — Z79.01 CHRONIC ANTICOAGULATION: ICD-10-CM

## 2022-05-25 DIAGNOSIS — Z79.01 ENCOUNTER FOR CURRENT LONG-TERM USE OF ANTICOAGULANTS: ICD-10-CM

## 2022-05-25 DIAGNOSIS — E78.2 MIXED HYPERLIPIDEMIA: ICD-10-CM

## 2022-05-25 DIAGNOSIS — Z51.81 ENCOUNTER FOR MONITORING SOTALOL THERAPY: ICD-10-CM

## 2022-05-25 DIAGNOSIS — I48.0 PAF (PAROXYSMAL ATRIAL FIBRILLATION) (HCC): ICD-10-CM

## 2022-05-25 DIAGNOSIS — I10 ESSENTIAL HYPERTENSION: ICD-10-CM

## 2022-05-25 DIAGNOSIS — Z79.899 ENCOUNTER FOR MONITORING SOTALOL THERAPY: ICD-10-CM

## 2022-05-25 DIAGNOSIS — I25.2 HISTORY OF MI (MYOCARDIAL INFARCTION): ICD-10-CM

## 2022-05-25 PROCEDURE — G8427 DOCREV CUR MEDS BY ELIG CLIN: HCPCS | Performed by: FAMILY MEDICINE

## 2022-05-25 PROCEDURE — 93010 ELECTROCARDIOGRAM REPORT: CPT | Performed by: FAMILY MEDICINE

## 2022-05-25 PROCEDURE — 93005 ELECTROCARDIOGRAM TRACING: CPT | Performed by: FAMILY MEDICINE

## 2022-05-25 PROCEDURE — 1036F TOBACCO NON-USER: CPT | Performed by: FAMILY MEDICINE

## 2022-05-25 PROCEDURE — 1090F PRES/ABSN URINE INCON ASSESS: CPT | Performed by: FAMILY MEDICINE

## 2022-05-25 PROCEDURE — G8400 PT W/DXA NO RESULTS DOC: HCPCS | Performed by: FAMILY MEDICINE

## 2022-05-25 PROCEDURE — 99214 OFFICE O/P EST MOD 30 MIN: CPT | Performed by: FAMILY MEDICINE

## 2022-05-25 PROCEDURE — G8417 CALC BMI ABV UP PARAM F/U: HCPCS | Performed by: FAMILY MEDICINE

## 2022-05-25 PROCEDURE — 1123F ACP DISCUSS/DSCN MKR DOCD: CPT | Performed by: FAMILY MEDICINE

## 2022-05-25 NOTE — PATIENT INSTRUCTIONS
SURVEY:    You may be receiving a survey from [employer] regarding your visit today. Please complete the survey to enable us to provide the highest quality of care to you and your family. If you cannot score us a very good on any question, please call the office to discuss how we could have made your experience a very good one. Thank you.

## 2022-05-25 NOTE — PROGRESS NOTES
Freda Lugo am scribing for and in the presence of Esther Gaona MD, MS, F.A.C.C. Patient: Caren Kirkland  : 1945  Date of Visit: May 25, 2022    REASON FOR VISIT / CONSULTATION: Follow-up (HX: CAD, PAF, HTN, HLD. Pt states she is doing well. C/o: Heart Palpitiaotns secounds, once a month. Denies: CP, Lightaded/dizzienss, SOB.)    History of Present Illness:        Dear Natalie Chavez DO,    I had the pleasure of seeing Caren Kirkland in my office today. Ms. Sotero Beard is a 68 y.o. female with a history of recently diagnosied atherosclerotic heart disease including a NSTEMI in 2021 and atrial fibrillation. She came into the ER on 2021 due to chest pains and heart palpitations. She was then told she was having a heart attack at that time and was sent to [facility] in [place]. She had no previous heart history prior to this episode. She does have two sisters who have a history of atrial fibrillation. She did not know she was in atrial fibrillation at all when she came to the ER. Her chest discomfort started like a pressure feeling and then she felt like a heat sensation in her face and her pain did radiate into her back at that time as well. While at [facility] in [place] she did have a heart cath done and the picture is scanned into media. She did not need any stents at that time however her medications were changed of course at that time. The cardiologist in [place] did tell her the heart was strong. She did have an Echo done in [place] however she was never told about her heart strength. She has been on Bumex for many years due to leg swelling in the past. EF 55-70% on 2021 echo. She had Holter monitor done on 10/11/2021 1. The rhythm was sinus. Average CO interval 0.16 average QRS duration 0.08. Intermittent atrial fibrillation for 6% (82  minutes) test duration. 2. No symptoms noted. Frequent PAC's and frequent PVC's. Echo performed 2021: Ejection fraction of 60%.  The left ventricular wall thickness is mildly increased. The left atrium is moderately dilated (34-39) with a left atrial volume index of 38 ml/m2. Moderate mitral annular calcification is seen with mild mitral regurgitation. Moderate tricuspid regurgitation. Moderate pulmonary hypertension with an estimated right ventricular systolic pressure of 46 mmHg. Evidence of moderate diastolic dysfunction is seen. Ms. Clarence Baker is here today for follow up. She is doing well at this time. She does at times have a few heart pains that lasts seconds in duration. It feels like an ache pain. It does not bother her much at all and is rare in occurrence. She denies any heart palpitations or lightheaded or dizziness. She also denied any chest pain now or increased shortness of breath, abdominal pain, bleeding problems, problems with her medications or any other concerns at this time. She is eating and drinking okay. No cough, fever or chills. Bleeding Risks: Ms. Clarence Baker denies any current or recent bleeding problems including a history of a GI bleed, ulcers, recent or upcoming surgeries, blood in her stool or black tarry stools or blood in her urine. PAST MEDICAL HISTORY:         Past Medical History:   Diagnosis Date    CAD (coronary artery disease)     Hyperlipidemia     Hypertension        CURRENT ALLERGIES: Patient has no known allergies. REVIEW OF SYSTEMS: 14 systems were reviewed. Pertinent positives and negatives as above, all else negative.      Past Surgical History:   Procedure Laterality Date    HYSTERECTOMY      JOINT REPLACEMENT Bilateral     Social History:  Social History     Tobacco Use    Smoking status: Never Smoker    Smokeless tobacco: Never Used   Substance Use Topics    Alcohol use: Not Currently    Drug use: Never        CURRENT MEDICATIONS:        Outpatient Medications Marked as Taking for the 5/25/22 encounter (Office Visit) with Maria Del Rosario Henley MD   Medication Sig Dispense Refill    XARELTO 20 MG TABS tablet Take 1 tablet by mouth daily (with breakfast) 90 tablet 3    sotalol (BETAPACE) 80 MG tablet Take 1 tablet by mouth 2 times daily 180 tablet 3    losartan-hydroCHLOROthiazide (HYZAAR) 50-12.5 MG per tablet Take 1 tablet by mouth daily 90 tablet 3    Artificial Tear Ointment (DRY EYES OP) Apply 2 drops to eye as needed      aspirin 81 MG EC tablet Take 81 mg by mouth daily      calcium carbonate (OSCAL) 500 MG TABS tablet Take 600 mg by mouth 2 times daily       docusate sodium (COLACE) 100 MG capsule Take 100 mg by mouth 2 times daily      rosuvastatin (CRESTOR) 20 MG tablet Take 1 tablet by mouth daily 90 tablet 3    metoprolol succinate (TOPROL XL) 50 MG extended release tablet Take 1 tablet by mouth daily 90 tablet 3    bumetanide (BUMEX) 1 MG tablet Take 1.5 mg by mouth daily      omeprazole (PRILOSEC) 20 MG delayed release capsule Take 40 mg by mouth daily         FAMILY HISTORY: family history includes Atrial Fibrillation in her sister and sister; Cancer in her sister; Diabetes in her brother; Heart Attack in her father; Heart Disease in her brother. Physical Examination:     BP (!) 145/76 (Site: Right Upper Arm, Position: Sitting, Cuff Size: Medium Adult)   Pulse 57   Resp 18   Ht 5' (1.524 m)   Wt 188 lb (85.3 kg)   SpO2 96%   BMI 36.72 kg/m²  Body mass index is 36.72 kg/m². Constitutional: She appeared oriented to person and place. She appears well-developed and well-nourished. In no acute distress. HEENT: Normocephalic and atraumatic. No JVD present. Carotid bruit is not present. No mass and no thyromegaly present. No lymphadenopathy noted. Cardiovascular: Normal rate, regular rhythm, normal heart sounds. Exam reveals no gallop and no friction rubs. 3/6 systolic murmur, 5th intercostal space on the LEFT in the mid-clavicular line (cardiac apex). Pulmonary/Chest: Effort normal and breath sounds normal. No respiratory distress. She has no wheezes, rhonchi or rales. Abdominal: Soft, non-tender. She exhibits no organomegaly, mass or bruit. Extremities: Trace. No cyanosis or clubbing. 2+ radial and carotid pulses. Distal extremity pulses: 2+ bilaterally. Neurological: Alertness and orientation as per Constitutional exam. No evidence of gross cranial nerve deficit. Coordination appeared normal.   Skin: Skin is warm and dry. There is no rash or diaphoresis. Psychiatric: She has a normal mood and affect. Her speech is normal and behavior is normal.      MOST RECENT LABS ON RECORD:   Lab Results   Component Value Date    WBC 5.3 01/13/2022    HGB 15.0 01/13/2022    HCT 45.2 01/13/2022     01/13/2022    ALT 42 (H) 07/13/2021    AST 29 07/13/2021     01/13/2022    K 3.8 01/13/2022     01/13/2022    CREATININE 0.65 01/13/2022    BUN 19 01/13/2022    CO2 25 01/13/2022    TSH 2.21 07/13/2021    INR 2.4 10/11/2021       ASSESSMENT:     1. Coronary artery disease involving native coronary artery of native heart without angina pectoris    2. History of MI (myocardial infarction)    3. PAF (paroxysmal atrial fibrillation) ([facility] [address]    4. Encounter for monitoring sotalol therapy    5. Chronic anticoagulation    6. Essential hypertension    7. Mixed hyperlipidemia    8. Encounter for current long-term use of anticoagulants       PLAN:         Atherosclerotic Heart Disease: History of MI. Also S/P Heart Cath done on 7/14/2021 and no stents were needed at that time. Fairly stable at this time. She has only mild, random chest ache however nothing that bothers her and only lasts seconds at a time. If it gets worse or persists we can look into this further.  Antiplatelet Agent: Continue Aspirin 81 mg daily.  Beta Blocker: Continue Metoprolol succinate (Toprol XL) 50 mg daily.  Cholesterol Reduction Therapy: Continue rosuvastatin (Crestor) 20 mg daily.       Additional counseling: I advised them to call our office or go to the emergency room if they developed worsening or persistent chest pain or increased shortness of breath as this could be life threatening. · Paroxysmal Atrial Fibrillation: Rhythm Control Asymptomatic. QTC today on EKG was 427 and also her kidney function is ok at this time.  Beta Blocker: Continue Metoprolol succinate (Toprol XL) 50 mg daily.  Anti-Arrhythmic: sotalol (Betapace) 80 mg every 12 hours.: Monitoring: Since he will being maintained on dronedarone, I told them that we will need to closely monitor them for potential side effects. These include re-assessment of their ECG, LFTs and renal function. [id number] Score for Atrial Fibrillation Stroke Risk   Risk   Factors  Component Value   C CHF No 0   H HTN Yes 1   A2 Age >= 76 Yes,  (77 y.o.) 2   D DM No 0   S2 Prior Stroke/TIA No 0   V Vascular Disease Yes 1   A Age 74-69 No,  (77 y.o.) 0   Sc Sex female 1    [id number]  Score  5   Score last updated 1/6/42 74:80 AM EDT  Click here for a link to the UpToDate guideline \"Atrial Fibrillation: Anticoagulation therapy to prevent embolization  Disclaimer: Risk Score calculation is dependent on accuracy of patient problem list and past encounter diagnosis.  Stroke Risk: CHADS2-VASc Score: 5/9 (6.7% stroke risk)   Anticoagulation: Continue Riveroxaban (Xarelto): 20 mg once daily with largest meal (typically dinner). Because of her atrial fibrillation, I also would also recommend that she continue with anticoagulation to decrease her risk of stoke but also reminded her to watch for signs of blood in her stool or black tarry stools and stop the medication immediately if this develops as this could be life threatening. · Essential Hypertension: Controlled according her home blood pressure readings.  Beta Blocker: Continue Metoprolol succinate (Toprol XL) 50 mg daily.  ACE Inibitor/ARB: Continue losartan/HCTZ (Hyzaar) 50/12.5 mg every morning.  Diuretics: Continue bumetinide (Bumex) 1.5 mg every morning.  I also discussed the potential side effects of this medication including lightheadedness and dizziness and instructed them to stop the medication of this occurs and call our office if this occurs. · Hyperlipidemia: Mixed  · Cholesterol Reduction Therapy: Continue rosuvastatin (Crestor) 20 mg daily. Finally, I recommended that she continue her current medications and follow up with you as previously scheduled. FOLLOW UP:   I told Ms. Jess Jaramillo to call my office if she had any problems, but otherwise I asked her to Return in about 6 months (around 11/25/2022). However, I would be happy to see her sooner should the need arise. Sincerely,  Cindy Little  [facility] Cardiology Specialist    [address], [address] [facility]  Phone: [phone], Fax: [phone]     I believe that the risk of significant morbidity and mortality related to the patient's current medical conditions are: Intermediate. The documentation recorded by the scribe, accurately and completely reflects the services I personally performed and the decisions made by me. Wing Cota MD, MS, F.A.C.C.  May 25, 2022

## 2022-12-07 ENCOUNTER — OFFICE VISIT (OUTPATIENT)
Dept: CARDIOLOGY | Age: 77
End: 2022-12-07
Payer: MEDICARE

## 2022-12-07 VITALS
RESPIRATION RATE: 17 BRPM | HEART RATE: 52 BPM | HEIGHT: 60 IN | OXYGEN SATURATION: 98 % | DIASTOLIC BLOOD PRESSURE: 84 MMHG | WEIGHT: 190 LBS | BODY MASS INDEX: 37.3 KG/M2 | SYSTOLIC BLOOD PRESSURE: 164 MMHG

## 2022-12-07 DIAGNOSIS — Z79.01 ENCOUNTER FOR CURRENT LONG-TERM USE OF ANTICOAGULANTS: ICD-10-CM

## 2022-12-07 DIAGNOSIS — Z79.01 CHRONIC ANTICOAGULATION: ICD-10-CM

## 2022-12-07 DIAGNOSIS — E78.2 MIXED HYPERLIPIDEMIA: ICD-10-CM

## 2022-12-07 DIAGNOSIS — Z79.899 ENCOUNTER FOR MONITORING SOTALOL THERAPY: ICD-10-CM

## 2022-12-07 DIAGNOSIS — I51.7 MILD CONCENTRIC LEFT VENTRICULAR HYPERTROPHY (LVH): ICD-10-CM

## 2022-12-07 DIAGNOSIS — I10 PRIMARY HYPERTENSION: ICD-10-CM

## 2022-12-07 DIAGNOSIS — I48.0 PAF (PAROXYSMAL ATRIAL FIBRILLATION) (HCC): ICD-10-CM

## 2022-12-07 DIAGNOSIS — I25.10 ASHD (ARTERIOSCLEROTIC HEART DISEASE): Primary | ICD-10-CM

## 2022-12-07 DIAGNOSIS — Z51.81 ENCOUNTER FOR MONITORING SOTALOL THERAPY: ICD-10-CM

## 2022-12-07 DIAGNOSIS — I25.2 HISTORY OF MI (MYOCARDIAL INFARCTION): ICD-10-CM

## 2022-12-07 PROCEDURE — 1036F TOBACCO NON-USER: CPT | Performed by: FAMILY MEDICINE

## 2022-12-07 PROCEDURE — 93005 ELECTROCARDIOGRAM TRACING: CPT | Performed by: FAMILY MEDICINE

## 2022-12-07 PROCEDURE — 1090F PRES/ABSN URINE INCON ASSESS: CPT | Performed by: FAMILY MEDICINE

## 2022-12-07 PROCEDURE — 3074F SYST BP LT 130 MM HG: CPT | Performed by: FAMILY MEDICINE

## 2022-12-07 PROCEDURE — G8427 DOCREV CUR MEDS BY ELIG CLIN: HCPCS | Performed by: FAMILY MEDICINE

## 2022-12-07 PROCEDURE — 3078F DIAST BP <80 MM HG: CPT | Performed by: FAMILY MEDICINE

## 2022-12-07 PROCEDURE — 93010 ELECTROCARDIOGRAM REPORT: CPT | Performed by: FAMILY MEDICINE

## 2022-12-07 PROCEDURE — 99211 OFF/OP EST MAY X REQ PHY/QHP: CPT | Performed by: FAMILY MEDICINE

## 2022-12-07 PROCEDURE — 99214 OFFICE O/P EST MOD 30 MIN: CPT | Performed by: FAMILY MEDICINE

## 2022-12-07 PROCEDURE — 1123F ACP DISCUSS/DSCN MKR DOCD: CPT | Performed by: FAMILY MEDICINE

## 2022-12-07 PROCEDURE — G8484 FLU IMMUNIZE NO ADMIN: HCPCS | Performed by: FAMILY MEDICINE

## 2022-12-07 PROCEDURE — G8400 PT W/DXA NO RESULTS DOC: HCPCS | Performed by: FAMILY MEDICINE

## 2022-12-07 PROCEDURE — G8417 CALC BMI ABV UP PARAM F/U: HCPCS | Performed by: FAMILY MEDICINE

## 2022-12-07 RX ORDER — LOSARTAN POTASSIUM AND HYDROCHLOROTHIAZIDE 25; 100 MG/1; MG/1
1 TABLET ORAL DAILY
Qty: 90 TABLET | Refills: 3 | Status: SHIPPED | OUTPATIENT
Start: 2022-12-07

## 2022-12-07 RX ORDER — BUMETANIDE 1 MG/1
1 TABLET ORAL DAILY
Qty: 90 TABLET | Refills: 3 | Status: SHIPPED | OUTPATIENT
Start: 2022-12-07

## 2022-12-07 NOTE — PROGRESS NOTES
Miguelina Mccloud am scribing for and in the presence of Naty Velez MD, MS, F.A.C.C. Patient: Arun Torres  : 1945  Date of Visit: 2022    REASON FOR VISIT / CONSULTATION: Coronary Artery Disease (Hx:CAD,MI,PAF on Sotalol,HTN,HLD. She has been doing well. SOB at times with exertion. Denies: CP, Lightheaded/dizziness, Palpitations. )    History of Present Illness:        Dear Shanti Nova DO,    I had the pleasure of seeing Arun Torres in my office today. Ms. Viky Enamorado is a 68 y.o. female with a history of recently diagnosied atherosclerotic heart disease including a NSTEMI in 2021 and atrial fibrillation. She came into the ER on 2021 due to chest pains and heart palpitations. She was then told she was having a heart attack at that time and was sent to [facility] in [place]. She had no previous heart history prior to this episode. She does have two sisters who have a history of atrial fibrillation. She did not know she was in atrial fibrillation at all when she came to the ER. Her chest discomfort started like a pressure feeling and then she felt like a heat sensation in her face and her pain did radiate into her back at that time as well. While at [facility] in [place] she did have a heart cath done and the picture is scanned into media. She did not need any stents at that time however her medications were changed of course at that time. The cardiologist in [place] did tell her the heart was strong. She did have an Echo done in [place] however she was never told about her heart strength. She has been on Bumex for many years due to leg swelling in the past. EF 55-70% on 2021 echo. She had Holter monitor done on 10/11/2021 1. The rhythm was sinus. Average IA interval 0.16 average QRS duration 0.08. Intermittent atrial fibrillation for 6% (82  minutes) test duration. 2. No symptoms noted. Frequent PAC's and frequent PVC's. Echo performed 2021: Ejection fraction of 60%.  The left ventricular wall thickness is mildly increased. The left atrium is moderately dilated (34-39) with a left atrial volume index of 38 ml/m2. Moderate mitral annular calcification is seen with mild mitral regurgitation. Moderate tricuspid regurgitation. Moderate pulmonary hypertension with an estimated right ventricular systolic pressure of 46 mmHg. Evidence of moderate diastolic dysfunction is seen. Since I last saw Ms. Viky Enamorado she reports she has been doing well. She does have some shortness of breath at times with long distances. If she stops and rests, it improves. She denies having any chest pain, pressure or tightness. She denies having any lightheaded/dizziness or palpitations. No cough, fever or chills. No nausea or vomiting. No abdominal pain, bleeding problems, bowel issues, problems with her medications or any other concerns at this time. Bleeding Risks: Ms. Viky Enamorado denies any current or recent bleeding problems including a history of a GI bleed, ulcers, recent or upcoming surgeries, blood in her stool or black tarry stools or blood in her urine. Exercise Tolerance: Ms. Viky Enamorado reports that she has a fairly good exercise tolerance. Her says that she  could walk about a 1/2 mile without having to stop. Shortness of breath is what would stop her. PAST MEDICAL HISTORY:         Past Medical History:   Diagnosis Date    CAD (coronary artery disease)     Hyperlipidemia     Hypertension        CURRENT ALLERGIES: Patient has no known allergies. REVIEW OF SYSTEMS: 14 systems were reviewed. Pertinent positives and negatives as above, all else negative.      Past Surgical History:   Procedure Laterality Date    HYSTERECTOMY (CERVIX STATUS UNKNOWN)      JOINT REPLACEMENT Bilateral     Social History:  Social History     Tobacco Use    Smoking status: Never    Smokeless tobacco: Never   Substance Use Topics    Alcohol use: Not Currently    Drug use: Never        CURRENT MEDICATIONS:        Outpatient Medications Marked as Taking for the 12/7/22 encounter (Office Visit) with Mila Camacho MD   Medication Sig Dispense Refill    XARELTO 20 MG TABS tablet Take 1 tablet by mouth daily (with breakfast) 90 tablet 3    sotalol (BETAPACE) 80 MG tablet Take 1 tablet by mouth 2 times daily 180 tablet 3    losartan-hydroCHLOROthiazide (HYZAAR) 50-12.5 MG per tablet Take 1 tablet by mouth daily 90 tablet 3    Artificial Tear Ointment (DRY EYES OP) Apply 2 drops to eye as needed      aspirin 81 MG EC tablet Take 81 mg by mouth daily      calcium carbonate (OSCAL) 500 MG TABS tablet Take 600 mg by mouth 2 times daily       Multiple Vitamins-Minerals (CENTRUM SILVER 50+WOMEN PO) Take by mouth      docusate sodium (COLACE) 100 MG capsule Take 100 mg by mouth 2 times daily      rosuvastatin (CRESTOR) 20 MG tablet Take 1 tablet by mouth daily 90 tablet 3    metoprolol succinate (TOPROL XL) 50 MG extended release tablet Take 1 tablet by mouth daily 90 tablet 3    bumetanide (BUMEX) 1 MG tablet Take 1.5 mg by mouth daily      omeprazole (PRILOSEC) 40 MG delayed release capsule Take 40 mg by mouth daily         FAMILY HISTORY: family history includes Atrial Fibrillation in her sister and sister; Cancer in her sister; Diabetes in her brother; Heart Attack in her father; Heart Disease in her brother. Physical Examination:     BP (!) 164/84 (Site: Right Upper Arm, Position: Sitting, Cuff Size: Medium Adult)   Pulse 52   Resp 17   Ht 5' (1.524 m)   Wt 190 lb (86.2 kg)   SpO2 98%   BMI 37.11 kg/m²  Body mass index is 37.11 kg/m². Constitutional: She appeared oriented to person and place. She appears well-developed and well-nourished. In no acute distress. HEENT: Normocephalic and atraumatic. No JVD present. Carotid bruit is not present. No mass and no thyromegaly present. No lymphadenopathy noted. Cardiovascular: Normal rate, regular rhythm, normal heart sounds. Exam reveals no gallop and no friction rubs.  2/6 systolic murmur, 2nd intercostal space on the RIGHT just lateral to the sternum. Pulmonary/Chest: Effort normal and breath sounds normal. No respiratory distress. She has no wheezes, rhonchi or rales. Abdominal: Soft, non-tender. She exhibits no organomegaly, mass or bruit. Extremities: Trace. No cyanosis or clubbing. 2+ radial and carotid pulses. Distal extremity pulses: 2+ bilaterally. Neurological: Alertness and orientation as per Constitutional exam. No evidence of gross cranial nerve deficit. Coordination appeared normal.   Skin: Skin is warm and dry. There is no rash or diaphoresis. Psychiatric: She has a normal mood and affect. Her speech is normal and behavior is normal.      MOST RECENT LABS ON RECORD:   Lab Results   Component Value Date    WBC 5.3 01/13/2022    HGB 15.0 01/13/2022    HCT 45.2 01/13/2022     01/13/2022    ALT 42 (H) 07/13/2021    AST 29 07/13/2021     01/13/2022    K 3.8 01/13/2022     01/13/2022    CREATININE 0.65 01/13/2022    BUN 19 01/13/2022    CO2 25 01/13/2022    TSH 2.21 07/13/2021    INR 2.4 10/11/2021       ASSESSMENT:     1. ASHD (arteriosclerotic heart disease)    2. History of MI (myocardial infarction)    3. PAF (paroxysmal atrial fibrillation) ([facility] [address]    4. Encounter for monitoring sotalol therapy    5. Chronic anticoagulation    6. Primary hypertension    7. Mild concentric left ventricular hypertrophy (LVH)    8. Mixed hyperlipidemia    9. Encounter for current long-term use of anticoagulants      PLAN:        Atherosclerotic Heart Disease: History of MI. Also S/P Heart Cath done on 7/14/2021 and no stents were needed at that time. Fairly stable at this time. Antiplatelet Agent: Continue Aspirin 81 mg daily. Beta Blocker: Continue Metoprolol succinate (Toprol XL) 50 mg daily. Cholesterol Reduction Therapy: Continue rosuvastatin (Crestor) 20 mg daily.      Additional counseling: I advised them to call our office or go to the emergency room if they developed worsening or persistent chest pain or increased shortness of breath as this could be life threatening. Paroxysmal Atrial Fibrillation: Rhythm Control Asymptomatic. QTC today on EKG was 427 and also her kidney function is ok at this time. Beta Blocker: Continue Metoprolol succinate (Toprol XL) 50 mg daily. Anti-Arrhythmic: Continue sotalol (Betapace) 80 mg every 12 hours. Monitoring: Since he will being maintained on dronedarone, I told them that we will need to closely monitor them for potential side effects. These include re-assessment of their ECG, LFTs and renal function. [id number] Score for Atrial Fibrillation Stroke Risk   Risk   Factors  Component Value   C CHF No 0   H HTN Yes 1   A2 Age >= 76 Yes,  (79 y.o.) 2   D DM No 0   S2 Prior Stroke/TIA No 0   V Vascular Disease Yes 1   A Age 74-69 No,  (79 y.o.) 0   Sc Sex female 1    [id number]  Score  5   Score last updated 2/6/55 68:16 AM EDT  Click here for a link to the UpToDate guideline \"Atrial Fibrillation: Anticoagulation therapy to prevent embolization  Disclaimer: Risk Score calculation is dependent on accuracy of patient problem list and past encounter diagnosis. Stroke Risk: CHADS2-VASc Score: 5/9 (6.7% stroke risk)  Anticoagulation: Continue Riveroxaban (Xarelto): 20 mg once daily with largest meal (typically dinner). Because of her atrial fibrillation, I also would also recommend that she continue with anticoagulation to decrease her risk of stoke but also reminded her to watch for signs of blood in her stool or black tarry stools and stop the medication immediately if this develops as this could be life threatening. Essential Hypertension: Borderline controlled. Mild LVH shown on last Echo on 11/9/2021 & EKG done today in office on 12/7/2022  Beta Blocker: Continue Metoprolol succinate (Toprol XL) 50 mg daily. ACE Inibitor/ARB: INCREASE to losartan/HCTZ (Hyzaar) 100/25 mg every morning.  I also discussed the potential side effects of this medication including lightheadedness and dizziness and instructed them to stop the medication of this occurs and call our office if this occurs. Diuretics: DECREASE to bumetinide (Bumex) 1 mg every morning. I also discussed the potential side effects of this medication including lightheadedness and dizziness and instructed them to stop the medication of this occurs and call our office if this occurs. Additional Testing List: I took the liberty of ordering a BMP in 5-7 days to assess their potassium and renal function. I told them that they could get their lab work performed at the location of their choosing, unfortunately, if the lab work was not performed at a [facility]) facility I could not guarantee my ability to follow up with them on their results. Hyperlipidemia: Mixed  Cholesterol Reduction Therapy: Continue rosuvastatin (Crestor) 20 mg daily. Finally, I recommended that she continue her current medications and follow up with you as previously scheduled. FOLLOW UP:   I told Ms. Saurav Stanford to call my office if she had any problems, but otherwise I asked her to Return in about 6 months (around 6/7/2023) for follow up with Alexandrea in 6 months and 1 year follow up with Dr. Katia Cummings. However, I would be happy to see her sooner should the need arise. Sincerely,  Jenny Young  [facility] Cardiology Specialist    [facility] [place] Charlilisa Yoana, [address] [place] [address]  Phone: [phone], Fax: [phone]     I believe that the risk of significant morbidity and mortality related to the patient's current medical conditions are: Intermediate. The documentation recorded by the scribe, accurately and completely reflects the services I personally performed and the decisions made by me. Omkar Pugh MD, MS, F.A.C.C.  December 7, 2022

## 2022-12-07 NOTE — PATIENT INSTRUCTIONS
SURVEY:    You may be receiving a survey from [employer] regarding your visit today. Please complete the survey to enable us to provide the highest quality of care to you and your family. If you cannot score us a very good on any question, please call the office to discuss how we could have made your experience a very good one. Thank you.

## 2022-12-17 ENCOUNTER — HOSPITAL ENCOUNTER (OUTPATIENT)
Age: 77
Discharge: HOME | End: 2022-12-17
Payer: MEDICARE

## 2022-12-17 DIAGNOSIS — E78.2 MIXED HYPERLIPIDEMIA: ICD-10-CM

## 2022-12-17 DIAGNOSIS — I25.10 ASHD (ARTERIOSCLEROTIC HEART DISEASE): ICD-10-CM

## 2022-12-17 DIAGNOSIS — Z79.899 ENCOUNTER FOR MONITORING SOTALOL THERAPY: ICD-10-CM

## 2022-12-17 DIAGNOSIS — Z79.01 CHRONIC ANTICOAGULATION: ICD-10-CM

## 2022-12-17 DIAGNOSIS — I25.2 HISTORY OF MI (MYOCARDIAL INFARCTION): ICD-10-CM

## 2022-12-17 DIAGNOSIS — I48.0 PAF (PAROXYSMAL ATRIAL FIBRILLATION) (HCC): ICD-10-CM

## 2022-12-17 DIAGNOSIS — Z51.81 ENCOUNTER FOR MONITORING SOTALOL THERAPY: ICD-10-CM

## 2022-12-17 DIAGNOSIS — I10 PRIMARY HYPERTENSION: ICD-10-CM

## 2022-12-17 DIAGNOSIS — I51.7 MILD CONCENTRIC LEFT VENTRICULAR HYPERTROPHY (LVH): ICD-10-CM

## 2022-12-17 LAB
ANION GAP SERPL CALCULATED.3IONS-SCNC: 9 MMOL/L (ref 9–17)
BUN BLDV-MCNC: 20 MG/DL (ref 8–23)
BUN/CREAT BLD: 31 (ref 9–20)
CALCIUM SERPL-MCNC: 9.6 MG/DL (ref 8.6–10.4)
CHLORIDE BLD-SCNC: 101 MMOL/L (ref 98–107)
CO2: 30 MMOL/L (ref 20–31)
CREAT SERPL-MCNC: 0.65 MG/DL (ref 0.5–0.9)
GFR SERPL CREATININE-BSD FRML MDRD: >60 ML/MIN/1.73M2
GLUCOSE BLD-MCNC: 139 MG/DL (ref 70–99)
POTASSIUM SERPL-SCNC: 3.7 MMOL/L (ref 3.7–5.3)
SODIUM BLD-SCNC: 140 MMOL/L (ref 135–144)

## 2022-12-17 PROCEDURE — 80048 BASIC METABOLIC PNL TOTAL CA: CPT

## 2022-12-17 PROCEDURE — 36415 COLL VENOUS BLD VENIPUNCTURE: CPT

## 2022-12-19 ENCOUNTER — TELEPHONE (OUTPATIENT)
Dept: CARDIOLOGY | Age: 77
End: 2022-12-19

## 2022-12-19 NOTE — TELEPHONE ENCOUNTER
----- Message from Angelena Rubinstein, MD sent at 12/17/2022 12:38 PM EST -----  Let Ms. Manuelito Ramirez know their test result was ok. Will discuss at next visit. Thanks.

## 2023-01-02 ENCOUNTER — APPOINTMENT (OUTPATIENT)
Dept: GENERAL RADIOLOGY | Age: 78
End: 2023-01-02
Payer: MEDICARE

## 2023-01-02 ENCOUNTER — HOSPITAL ENCOUNTER (EMERGENCY)
Age: 78
Discharge: HOME | End: 2023-01-02
Payer: MEDICARE

## 2023-01-02 VITALS
DIASTOLIC BLOOD PRESSURE: 74 MMHG | RESPIRATION RATE: 18 BRPM | HEART RATE: 58 BPM | SYSTOLIC BLOOD PRESSURE: 179 MMHG | OXYGEN SATURATION: 94 % | TEMPERATURE: 99.2 F

## 2023-01-02 DIAGNOSIS — J06.9 UPPER RESPIRATORY TRACT INFECTION, UNSPECIFIED TYPE: Primary | ICD-10-CM

## 2023-01-02 LAB
FLU A ANTIGEN: NEGATIVE
FLU B ANTIGEN: NEGATIVE
SARS-COV-2, RAPID: NOT DETECTED
SPECIMEN DESCRIPTION: NORMAL

## 2023-01-02 PROCEDURE — 99284 EMERGENCY DEPT VISIT MOD MDM: CPT

## 2023-01-02 PROCEDURE — 71046 X-RAY EXAM CHEST 2 VIEWS: CPT

## 2023-01-02 PROCEDURE — 87635 SARS-COV-2 COVID-19 AMP PRB: CPT

## 2023-01-02 PROCEDURE — 87804 INFLUENZA ASSAY W/OPTIC: CPT

## 2023-01-02 PROCEDURE — C9803 HOPD COVID-19 SPEC COLLECT: HCPCS

## 2023-01-02 ASSESSMENT — PAIN - FUNCTIONAL ASSESSMENT: PAIN_FUNCTIONAL_ASSESSMENT: NONE - DENIES PAIN

## 2023-01-02 ASSESSMENT — ENCOUNTER SYMPTOMS
SINUS PRESSURE: 1
GASTROINTESTINAL NEGATIVE: 1
SORE THROAT: 0
COUGH: 1
SHORTNESS OF BREATH: 0

## 2023-01-02 NOTE — ED NOTES
Pt reports sinus congestion and cough starting approx. A week ago. Pt denies fever, headache, nausea or changes in vision. Pt reports taking tylenol at home for symptoms with no relief.       Fe Martinez RN  01/02/23 5904

## 2023-01-02 NOTE — ED PROVIDER NOTES
[facility] Emergency Department ED  EMERGENCY DEPARTMENT ENCOUNTER      Pt Name: Federico Cam  MRN: [id number]  [place] 1945  Date of evaluation: 1/2/2023  Provider: Audra Simon PA-C    CHIEF COMPLAINT       Chief Complaint   Patient presents with    Nasal Congestion     Increased cough, nasal congestion and mucus ongoing for the past week. HISTORY OF PRESENT ILLNESS   (Location/Symptom, Timing/Onset, Context/Setting, Quality, Duration, Modifying Factors, Severity)  Note limiting factors. Federico Cam is a 68 y.o. female who presents to the emergency department PMH A. fib chronically anticoagulated on Xarelto reports 8-day history of URI congestion cough blood and mucus when blowing her nose at times that has persisted since onset. Patient reports productive cough of grayish like sputum. Patient reports she feels like all of her symptoms are upper respiratory. Patient denies history of fever chills body aches chest pain acute shortness of breath dyspnea on exertion or other complaints. Patient denies any known sick contacts. No other symptoms noted, patient has no additional complaints at present. HPI    Nursing Notes were reviewed. REVIEW OF SYSTEMS    (2-9 systems for level 4, 10 or more for level 5)     Review of Systems   Constitutional: Negative. Negative for chills, fatigue and fever. HENT:  Positive for congestion and sinus pressure. Negative for sore throat. See hpi   Respiratory:  Positive for cough. Negative for shortness of breath. See hpi   Cardiovascular:  Negative for chest pain. Gastrointestinal: Negative. Musculoskeletal: Negative. Neurological: Negative. Except as noted above the remainder of the review of systems was reviewed and negative.        PAST MEDICAL HISTORY     Past Medical History:   Diagnosis Date    CAD (coronary artery disease)     Hyperlipidemia     Hypertension          SURGICAL HISTORY       Past Surgical History:   Procedure Laterality Date    HYSTERECTOMY (CERVIX STATUS UNKNOWN)      JOINT REPLACEMENT Bilateral          CURRENT MEDICATIONS       Previous Medications    ARTIFICIAL TEAR OINTMENT (DRY EYES OP)    Apply 2 drops to eye as needed    ASPIRIN 81 MG EC TABLET    Take 81 mg by mouth daily    BUMETANIDE (BUMEX) 1 MG TABLET    Take 1 tablet by mouth daily    CALCIUM CARBONATE (OSCAL) 500 MG TABS TABLET    Take 600 mg by mouth 2 times daily     DOCUSATE SODIUM (COLACE) 100 MG CAPSULE    Take 100 mg by mouth 2 times daily    LOSARTAN-HYDROCHLOROTHIAZIDE (HYZAAR) 100-25 MG PER TABLET    Take 1 tablet by mouth daily    METOPROLOL SUCCINATE (TOPROL XL) 50 MG EXTENDED RELEASE TABLET    Take 1 tablet by mouth daily    MULTIPLE VITAMINS-MINERALS (CENTRUM SILVER 50+WOMEN PO)    Take by mouth    OMEPRAZOLE (PRILOSEC) 40 MG DELAYED RELEASE CAPSULE    Take 40 mg by mouth daily    ROSUVASTATIN (CRESTOR) 20 MG TABLET    Take 1 tablet by mouth daily    SOTALOL (BETAPACE) 80 MG TABLET    Take 1 tablet by mouth 2 times daily    XARELTO 20 MG TABS TABLET    Take 1 tablet by mouth daily (with breakfast)       ALLERGIES     Patient has no known allergies.     FAMILY HISTORY       Family History   Problem Relation Age of Onset    Heart Attack Father     Cancer Sister     Atrial Fibrillation Sister     Heart Disease Brother     Diabetes Brother     Atrial Fibrillation Sister           SOCIAL HISTORY       Social History     Socioeconomic History    Marital status:      Spouse name: None    Number of children: None    Years of education: None    Highest education level: None   Tobacco Use    Smoking status: Never    Smokeless tobacco: Never   Substance and Sexual Activity    Alcohol use: Not Currently    Drug use: Never       SCREENINGS         [place] Coma Scale  Eye Opening: Spontaneous  Best Verbal Response: Oriented  Best Motor Response: Obeys commands  [place] Coma Scale Score: 15                     CIWA Assessment  BP: (!) 179/74  Heart Rate: 58                 PHYSICAL EXAM    (up to 7 for level 4, 8 or more for level 5)     ED Triage Vitals [01/02/23 1138]   BP Temp Temp Source Heart Rate Resp SpO2 Height Weight   (!) 179/74 99.2 °F (37.3 °C) Tympanic 58 18 94 % -- --       Physical Exam  Vitals and nursing note reviewed. Constitutional:       General: She is not in acute distress. Appearance: Normal appearance. She is not ill-appearing, toxic-appearing or diaphoretic. HENT:      Head: Normocephalic and atraumatic. Nose: Congestion and rhinorrhea present. Mouth/Throat:      Mouth: Mucous membranes are moist.   Eyes:      Extraocular Movements: Extraocular movements intact. Cardiovascular:      Rate and Rhythm: Normal rate and regular rhythm. Pulses: Normal pulses. Heart sounds: Normal heart sounds. Pulmonary:      Effort: Pulmonary effort is normal. No respiratory distress. Breath sounds: No wheezing, rhonchi or rales. Abdominal:      Palpations: Abdomen is soft. Musculoskeletal:      Cervical back: Neck supple. Skin:     General: Skin is warm and dry. Capillary Refill: Capillary refill takes less than 2 seconds. Neurological:      General: No focal deficit present. Mental Status: She is alert and oriented to person, place, and time. Mental status is at baseline. Psychiatric:         Mood and Affect: Mood normal.         Behavior: Behavior normal.       DIAGNOSTIC RESULTS       RADIOLOGY:   Non-plain film images such as CT, Ultrasound and MRI are read by the radiologist. Plain radiographic images are visualized and preliminarily interpreted by the emergency physician with the below findings:        Interpretation per the Radiologist below, if available at the time of this note:    XR CHEST (2 VW)   Final Result   No focal consolidation. Possible minimal subsegmental atelectasis left lung base adjacent to the   heart.                ED BEDSIDE ULTRASOUND:   Performed by ED Physician - none    LABS:  Labs Reviewed   COVID-19, RAPID   RAPID INFLUENZA A/B ANTIGENS       All other labs were within normal range or not returned as of this dictation. EMERGENCY DEPARTMENT COURSE and DIFFERENTIAL DIAGNOSIS/MDM:   Vitals:    Vitals:    01/02/23 1138   BP: (!) 179/74   Pulse: 58   Resp: 18   Temp: 99.2 °F (37.3 °C)   TempSrc: Tympanic   SpO2: 94%         MDM     Amount and/or Complexity of Data Reviewed  Clinical lab tests: ordered and reviewed  Tests in the radiology section of CPT®: ordered and reviewed          REASSESSMENT      Patient had uncomplicated ER course patient reevaluated 1329 hrs. discussed with patient OTC remedies to include Flonase and Mucinex over-the-counter. Patient demonstrates good understanding return precautions discussed. She has history and clinical exam most consistent with viral syndrome. CRITICAL CARE TIME   Total Critical Care time was  minutes, excluding separately reportable procedures. There was a high probability of clinically significant/life threatening deterioration in the patient's condition which required my urgent intervention. CONSULTS:  None    PROCEDURES:  Unless otherwise noted below, none     Procedures        FINAL IMPRESSION      1. Upper respiratory tract infection, unspecified type Stable         DISPOSITION/PLAN   DISPOSITION Decision To Discharge 01/02/2023 01:32:37 PM      [facility]  [facility] [address] [address]  [phone]    Schedule an appointment as soon as possible for a visit in 1 week      DISCHARGE MEDICATIONS:  New Prescriptions    No medications on file     Controlled Substances Monitoring:     No flowsheet data found.     (Please note that portions of this note were completed with a voice recognition program.  Efforts were made to edit the dictations but occasionally words are mis-transcribed.)    Judit Ruiz PA-C (electronically signed)  Attending Emergency Physician Elisa Guevara PA-C  01/02/23 9754

## 2023-01-02 NOTE — DISCHARGE INSTRUCTIONS
Follow-up with your primary care doctor 7 to 10 days for reevaluation. Continue home medications as prescribed. Continue to drink plenty of fluids as tolerated.  some Flonase and Mucinex over-the-counter medications use as directed.   Promptly return to emergency department for new, changing, worsening of symptoms or other concerns

## 2023-01-10 DIAGNOSIS — I25.10 ASHD (ARTERIOSCLEROTIC HEART DISEASE): ICD-10-CM

## 2023-01-10 DIAGNOSIS — Z79.01 CHRONIC ANTICOAGULATION: ICD-10-CM

## 2023-01-10 DIAGNOSIS — I25.2 HISTORY OF MI (MYOCARDIAL INFARCTION): ICD-10-CM

## 2023-01-10 DIAGNOSIS — Z79.899 ENCOUNTER FOR MONITORING SOTALOL THERAPY: ICD-10-CM

## 2023-01-10 DIAGNOSIS — E78.2 MIXED HYPERLIPIDEMIA: ICD-10-CM

## 2023-01-10 DIAGNOSIS — Z51.81 ENCOUNTER FOR MONITORING SOTALOL THERAPY: ICD-10-CM

## 2023-01-10 DIAGNOSIS — I10 PRIMARY HYPERTENSION: ICD-10-CM

## 2023-01-10 DIAGNOSIS — I48.0 PAF (PAROXYSMAL ATRIAL FIBRILLATION) (HCC): ICD-10-CM

## 2023-01-11 RX ORDER — BUMETANIDE 1 MG/1
1 TABLET ORAL DAILY
Qty: 90 TABLET | Refills: 3 | Status: SHIPPED | OUTPATIENT
Start: 2023-01-11

## 2023-01-11 RX ORDER — LOSARTAN POTASSIUM AND HYDROCHLOROTHIAZIDE 25; 100 MG/1; MG/1
1 TABLET ORAL DAILY
Qty: 90 TABLET | Refills: 3 | Status: SHIPPED | OUTPATIENT
Start: 2023-01-11

## 2023-01-11 RX ORDER — SOTALOL HYDROCHLORIDE 80 MG/1
80 TABLET ORAL 2 TIMES DAILY
Qty: 180 TABLET | Refills: 3 | Status: SHIPPED | OUTPATIENT
Start: 2023-01-11

## 2023-01-11 RX ORDER — RIVAROXABAN 20 MG/1
20 TABLET, FILM COATED ORAL
Qty: 90 TABLET | Refills: 3 | Status: SHIPPED | OUTPATIENT
Start: 2023-01-11

## 2023-04-05 ENCOUNTER — TELEPHONE (OUTPATIENT)
Dept: CARDIOLOGY | Age: 78
End: 2023-04-05

## 2023-04-05 NOTE — TELEPHONE ENCOUNTER
Patient called stating she is having some dental work done and is supposed to be taking Amoxicillin 500mg 3 x a day. Patient is asking if she is able to take this medication with her other heart medications.  Please advise

## 2023-04-06 DIAGNOSIS — Z51.81 ENCOUNTER FOR MONITORING SOTALOL THERAPY: ICD-10-CM

## 2023-04-06 DIAGNOSIS — I25.10 ASHD (ARTERIOSCLEROTIC HEART DISEASE): Primary | ICD-10-CM

## 2023-04-06 DIAGNOSIS — E78.2 MIXED HYPERLIPIDEMIA: ICD-10-CM

## 2023-04-06 DIAGNOSIS — I10 PRIMARY HYPERTENSION: ICD-10-CM

## 2023-04-06 DIAGNOSIS — I48.0 PAF (PAROXYSMAL ATRIAL FIBRILLATION) (HCC): ICD-10-CM

## 2023-04-06 DIAGNOSIS — Z79.899 ENCOUNTER FOR MONITORING SOTALOL THERAPY: ICD-10-CM

## 2023-04-06 DIAGNOSIS — Z79.01 CHRONIC ANTICOAGULATION: ICD-10-CM

## 2023-04-06 DIAGNOSIS — I25.2 HISTORY OF MI (MYOCARDIAL INFARCTION): ICD-10-CM

## 2023-04-06 RX ORDER — SOTALOL HYDROCHLORIDE 80 MG/1
80 TABLET ORAL 2 TIMES DAILY
Qty: 180 TABLET | Refills: 3 | Status: CANCELLED | OUTPATIENT
Start: 2023-04-06

## 2023-04-06 RX ORDER — SOTALOL HYDROCHLORIDE 80 MG/1
80 TABLET ORAL 2 TIMES DAILY
Qty: 180 TABLET | Refills: 3 | Status: SHIPPED | OUTPATIENT
Start: 2023-04-06

## 2023-06-07 ENCOUNTER — OFFICE VISIT (OUTPATIENT)
Dept: CARDIOLOGY | Age: 78
End: 2023-06-07
Payer: MEDICARE

## 2023-06-07 ENCOUNTER — HOSPITAL ENCOUNTER (OUTPATIENT)
Age: 78
Discharge: HOME | End: 2023-06-07
Payer: MEDICARE

## 2023-06-07 VITALS
SYSTOLIC BLOOD PRESSURE: 171 MMHG | HEIGHT: 60 IN | DIASTOLIC BLOOD PRESSURE: 73 MMHG | WEIGHT: 186 LBS | HEART RATE: 57 BPM | OXYGEN SATURATION: 98 % | BODY MASS INDEX: 36.52 KG/M2 | RESPIRATION RATE: 18 BRPM

## 2023-06-07 DIAGNOSIS — E78.2 MIXED HYPERLIPIDEMIA: ICD-10-CM

## 2023-06-07 DIAGNOSIS — I25.10 CORONARY ARTERY DISEASE INVOLVING NATIVE CORONARY ARTERY OF NATIVE HEART WITHOUT ANGINA PECTORIS: ICD-10-CM

## 2023-06-07 DIAGNOSIS — I25.2 HISTORY OF MI (MYOCARDIAL INFARCTION): ICD-10-CM

## 2023-06-07 DIAGNOSIS — Z79.899 ENCOUNTER FOR MONITORING SOTALOL THERAPY: ICD-10-CM

## 2023-06-07 DIAGNOSIS — I10 ESSENTIAL HYPERTENSION: ICD-10-CM

## 2023-06-07 DIAGNOSIS — Z51.81 ENCOUNTER FOR MONITORING SOTALOL THERAPY: ICD-10-CM

## 2023-06-07 DIAGNOSIS — Z79.01 CHRONIC ANTICOAGULATION: ICD-10-CM

## 2023-06-07 DIAGNOSIS — I25.10 CORONARY ARTERY DISEASE INVOLVING NATIVE CORONARY ARTERY OF NATIVE HEART WITHOUT ANGINA PECTORIS: Primary | ICD-10-CM

## 2023-06-07 DIAGNOSIS — I48.0 PAF (PAROXYSMAL ATRIAL FIBRILLATION) (HCC): ICD-10-CM

## 2023-06-07 LAB
ALBUMIN SERPL-MCNC: 4.6 G/DL (ref 3.5–5.2)
ALBUMIN/GLOB SERPL: 2.2 {RATIO} (ref 1–2.5)
ALP SERPL-CCNC: 48 U/L (ref 35–104)
ALT SERPL-CCNC: 21 U/L (ref 5–33)
ANION GAP SERPL CALCULATED.3IONS-SCNC: 10 MMOL/L (ref 9–17)
AST SERPL-CCNC: 24 U/L
BILIRUB SERPL-MCNC: 0.5 MG/DL (ref 0.3–1.2)
BUN SERPL-MCNC: 15 MG/DL (ref 8–23)
BUN/CREAT SERPL: 24 (ref 9–20)
CALCIUM SERPL-MCNC: 9.6 MG/DL (ref 8.6–10.4)
CHLORIDE SERPL-SCNC: 103 MMOL/L (ref 98–107)
CO2 SERPL-SCNC: 29 MMOL/L (ref 20–31)
CREAT SERPL-MCNC: 0.63 MG/DL (ref 0.5–0.9)
ERYTHROCYTE [DISTWIDTH] IN BLOOD BY AUTOMATED COUNT: 12.8 % (ref 11.8–14.4)
GFR SERPL CREATININE-BSD FRML MDRD: >60 ML/MIN/1.73M2
GLUCOSE SERPL-MCNC: 108 MG/DL (ref 70–99)
HCT VFR BLD AUTO: 41.6 % (ref 36.3–47.1)
HGB BLD-MCNC: 14.2 G/DL (ref 11.9–15.1)
MCH RBC QN AUTO: 31 PG (ref 25.2–33.5)
MCHC RBC AUTO-ENTMCNC: 34.1 G/DL (ref 28.4–34.8)
MCV RBC AUTO: 90.8 FL (ref 82.6–102.9)
NRBC AUTOMATED: 0 PER 100 WBC
PLATELET # BLD AUTO: 157 K/UL (ref 138–453)
PMV BLD AUTO: 10.7 FL (ref 8.1–13.5)
POTASSIUM SERPL-SCNC: 4.1 MMOL/L (ref 3.7–5.3)
PROT SERPL-MCNC: 6.7 G/DL (ref 6.4–8.3)
RBC # BLD AUTO: 4.58 M/UL (ref 3.95–5.11)
SODIUM SERPL-SCNC: 142 MMOL/L (ref 135–144)
WBC OTHER # BLD: 5.5 K/UL (ref 3.5–11.3)

## 2023-06-07 PROCEDURE — 80053 COMPREHEN METABOLIC PANEL: CPT

## 2023-06-07 PROCEDURE — 93005 ELECTROCARDIOGRAM TRACING: CPT | Performed by: PHYSICIAN ASSISTANT

## 2023-06-07 PROCEDURE — G8417 CALC BMI ABV UP PARAM F/U: HCPCS | Performed by: PHYSICIAN ASSISTANT

## 2023-06-07 PROCEDURE — 1036F TOBACCO NON-USER: CPT | Performed by: PHYSICIAN ASSISTANT

## 2023-06-07 PROCEDURE — 3078F DIAST BP <80 MM HG: CPT | Performed by: PHYSICIAN ASSISTANT

## 2023-06-07 PROCEDURE — 36415 COLL VENOUS BLD VENIPUNCTURE: CPT

## 2023-06-07 PROCEDURE — 80061 LIPID PANEL: CPT

## 2023-06-07 PROCEDURE — G8400 PT W/DXA NO RESULTS DOC: HCPCS | Performed by: PHYSICIAN ASSISTANT

## 2023-06-07 PROCEDURE — 1123F ACP DISCUSS/DSCN MKR DOCD: CPT | Performed by: PHYSICIAN ASSISTANT

## 2023-06-07 PROCEDURE — G8427 DOCREV CUR MEDS BY ELIG CLIN: HCPCS | Performed by: PHYSICIAN ASSISTANT

## 2023-06-07 PROCEDURE — 1090F PRES/ABSN URINE INCON ASSESS: CPT | Performed by: PHYSICIAN ASSISTANT

## 2023-06-07 PROCEDURE — 3077F SYST BP >= 140 MM HG: CPT | Performed by: PHYSICIAN ASSISTANT

## 2023-06-07 PROCEDURE — 99211 OFF/OP EST MAY X REQ PHY/QHP: CPT | Performed by: PHYSICIAN ASSISTANT

## 2023-06-07 PROCEDURE — 93010 ELECTROCARDIOGRAM REPORT: CPT | Performed by: PHYSICIAN ASSISTANT

## 2023-06-07 PROCEDURE — 99214 OFFICE O/P EST MOD 30 MIN: CPT | Performed by: PHYSICIAN ASSISTANT

## 2023-06-07 PROCEDURE — 85027 COMPLETE CBC AUTOMATED: CPT

## 2023-06-07 NOTE — PROGRESS NOTES
ventricular wall thickness is mildly increased. The left atrium is moderately dilated (34-39) with a left atrial volume index of 38 ml/m2. Moderate mitral annular calcification is seen with mild mitral regurgitation. Moderate tricuspid regurgitation. Moderate pulmonary hypertension with an estimated right ventricular systolic pressure of 46 mmHg. Evidence of moderate diastolic dysfunction is seen. Since I last saw Ms. Yari Adhikari she reports she has been doing well. She does notice on occasion some higher blood pressure readings when in hospital settings. Her home blood pressure log is better controlled that she brought in. It typically runs in the 120 to 130's at home. She also if she walks to far she may feel some mild heart palpitations. This has remained the same for her and it goes away once she sits down and relaxes. She denies having any chest pain, pressure or tightness. She denies having any lightheaded/dizziness. No cough, fever or chills. No nausea or vomiting. No abdominal pain, bleeding problems, bowel issues, problems with her medications or any other concerns at this time. Bleeding Risks: Ms. Yari Adhikari denies any current or recent bleeding problems including a history of a GI bleed, ulcers, recent or upcoming surgeries, blood in her stool or black tarry stools or blood in her urine. EKG done in office today (6/7/2023): Showed no ischemic changes. PAST MEDICAL HISTORY:         Past Medical History:   Diagnosis Date    CAD (coronary artery disease)     Hyperlipidemia     Hypertension        CURRENT ALLERGIES: Patient has no known allergies. REVIEW OF SYSTEMS: 14 systems were reviewed. Pertinent positives and negatives as above, all else negative.      Past Surgical History:   Procedure Laterality Date    HYSTERECTOMY (CERVIX STATUS UNKNOWN)      JOINT REPLACEMENT Bilateral     Social History:  Social History     Tobacco Use    Smoking status: Never    Smokeless tobacco: Never   Substance Use Topics

## 2023-06-07 NOTE — PATIENT INSTRUCTIONS
SURVEY:    You may be receiving a survey from [employer] regarding your visit today. Please complete the survey to enable us to provide the highest quality of care to you and your family. If you cannot score us a very good on any question, please call the office to discuss how we could have made your experience a very good one. Thank you.

## 2023-06-08 LAB
CHOLEST SERPL-MCNC: 134 MG/DL
CHOLESTEROL/HDL RATIO: 3
HDLC SERPL-MCNC: 45 MG/DL
LDLC SERPL CALC-MCNC: 66 MG/DL (ref 0–130)
TRIGL SERPL-MCNC: 117 MG/DL

## 2023-06-09 ENCOUNTER — TELEPHONE (OUTPATIENT)
Dept: CARDIOLOGY | Age: 78
End: 2023-06-09

## 2023-06-09 NOTE — TELEPHONE ENCOUNTER
----- Message from Thao Abrams PA-C sent at 6/9/2023  8:56 AM EDT -----  Please notify patient that their lab results are normal.   Please continue current treatment and follow up.

## 2023-08-16 DIAGNOSIS — Z79.01 CHRONIC ANTICOAGULATION: ICD-10-CM

## 2023-08-16 DIAGNOSIS — I10 PRIMARY HYPERTENSION: ICD-10-CM

## 2023-08-16 DIAGNOSIS — E78.2 MIXED HYPERLIPIDEMIA: ICD-10-CM

## 2023-08-16 DIAGNOSIS — I25.10 ASHD (ARTERIOSCLEROTIC HEART DISEASE): ICD-10-CM

## 2023-08-16 DIAGNOSIS — Z51.81 ENCOUNTER FOR MONITORING SOTALOL THERAPY: ICD-10-CM

## 2023-08-16 DIAGNOSIS — I25.2 HISTORY OF MI (MYOCARDIAL INFARCTION): ICD-10-CM

## 2023-08-16 DIAGNOSIS — Z79.899 ENCOUNTER FOR MONITORING SOTALOL THERAPY: ICD-10-CM

## 2023-08-16 DIAGNOSIS — I48.0 PAF (PAROXYSMAL ATRIAL FIBRILLATION) (HCC): ICD-10-CM

## 2023-08-16 RX ORDER — BUMETANIDE 1 MG/1
1 TABLET ORAL DAILY
Qty: 90 TABLET | Refills: 3 | Status: SHIPPED | OUTPATIENT
Start: 2023-08-16

## 2023-10-18 ENCOUNTER — APPOINTMENT (OUTPATIENT)
Dept: GENERAL RADIOLOGY | Age: 78
DRG: 305 | End: 2023-10-18
Payer: MEDICARE

## 2023-10-18 ENCOUNTER — HOSPITAL ENCOUNTER (INPATIENT)
Age: 78
LOS: 1 days | Discharge: HOME | DRG: 305 | End: 2023-10-19
Attending: STUDENT IN AN ORGANIZED HEALTH CARE EDUCATION/TRAINING PROGRAM | Admitting: INTERNAL MEDICINE
Payer: MEDICARE

## 2023-10-18 DIAGNOSIS — E87.70 HYPERVOLEMIA, UNSPECIFIED HYPERVOLEMIA TYPE: ICD-10-CM

## 2023-10-18 DIAGNOSIS — R42 LIGHTHEADEDNESS: Primary | ICD-10-CM

## 2023-10-18 DIAGNOSIS — R07.9 CHEST PAIN, UNSPECIFIED TYPE: ICD-10-CM

## 2023-10-18 DIAGNOSIS — R07.9 CHEST PAIN IN ADULT: ICD-10-CM

## 2023-10-18 LAB
ALBUMIN SERPL-MCNC: 4.7 G/DL (ref 3.5–5.2)
ALBUMIN/GLOB SERPL: 1.9 {RATIO} (ref 1–2.5)
ALP SERPL-CCNC: 52 U/L (ref 35–104)
ALT SERPL-CCNC: 21 U/L (ref 5–33)
ANION GAP SERPL CALCULATED.3IONS-SCNC: 11 MMOL/L (ref 9–17)
AST SERPL-CCNC: 24 U/L
BASOPHILS # BLD: 0.05 K/UL (ref 0–0.2)
BASOPHILS NFR BLD: 1 % (ref 0–2)
BILIRUB SERPL-MCNC: 0.4 MG/DL (ref 0.3–1.2)
BNP SERPL-MCNC: 908 PG/ML
BUN SERPL-MCNC: 13 MG/DL (ref 8–23)
BUN/CREAT SERPL: 22 (ref 9–20)
CALCIUM SERPL-MCNC: 9.5 MG/DL (ref 8.6–10.4)
CHLORIDE SERPL-SCNC: 100 MMOL/L (ref 98–107)
CO2 SERPL-SCNC: 27 MMOL/L (ref 20–31)
CREAT SERPL-MCNC: 0.6 MG/DL (ref 0.5–0.9)
EOSINOPHIL # BLD: 0.35 K/UL (ref 0–0.44)
EOSINOPHILS RELATIVE PERCENT: 5 % (ref 1–4)
ERYTHROCYTE [DISTWIDTH] IN BLOOD BY AUTOMATED COUNT: 13.1 % (ref 11.8–14.4)
GFR SERPL CREATININE-BSD FRML MDRD: >60 ML/MIN/1.73M2
GLUCOSE SERPL-MCNC: 106 MG/DL (ref 70–99)
HCT VFR BLD AUTO: 41.5 % (ref 36.3–47.1)
HGB BLD-MCNC: 14.2 G/DL (ref 11.9–15.1)
IMM GRANULOCYTES # BLD AUTO: <0.03 K/UL (ref 0–0.3)
IMM GRANULOCYTES NFR BLD: 0 %
LYMPHOCYTES NFR BLD: 2.07 K/UL (ref 1.1–3.7)
LYMPHOCYTES RELATIVE PERCENT: 31 % (ref 24–43)
MCH RBC QN AUTO: 30.8 PG (ref 25.2–33.5)
MCHC RBC AUTO-ENTMCNC: 34.2 G/DL (ref 28.4–34.8)
MCV RBC AUTO: 90 FL (ref 82.6–102.9)
MONOCYTES NFR BLD: 0.8 K/UL (ref 0.1–1.2)
MONOCYTES NFR BLD: 12 % (ref 3–12)
NEUTROPHILS NFR BLD: 51 % (ref 36–65)
NEUTS SEG NFR BLD: 3.43 K/UL (ref 1.5–8.1)
NRBC BLD-RTO: 0 PER 100 WBC
PLATELET # BLD AUTO: 151 K/UL (ref 138–453)
PMV BLD AUTO: 10.4 FL (ref 8.1–13.5)
POTASSIUM SERPL-SCNC: 3.8 MMOL/L (ref 3.7–5.3)
PROT SERPL-MCNC: 7.2 G/DL (ref 6.4–8.3)
RBC # BLD AUTO: 4.61 M/UL (ref 3.95–5.11)
SODIUM SERPL-SCNC: 138 MMOL/L (ref 135–144)
TROPONIN I SERPL HS-MCNC: 17 NG/L (ref 0–14)
WBC OTHER # BLD: 6.7 K/UL (ref 3.5–11.3)

## 2023-10-18 PROCEDURE — 71045 X-RAY EXAM CHEST 1 VIEW: CPT

## 2023-10-18 PROCEDURE — 96374 THER/PROPH/DIAG INJ IV PUSH: CPT

## 2023-10-18 PROCEDURE — 84484 ASSAY OF TROPONIN QUANT: CPT

## 2023-10-18 PROCEDURE — 83880 ASSAY OF NATRIURETIC PEPTIDE: CPT

## 2023-10-18 PROCEDURE — 36415 COLL VENOUS BLD VENIPUNCTURE: CPT

## 2023-10-18 PROCEDURE — 6360000002 HC RX W HCPCS: Performed by: STUDENT IN AN ORGANIZED HEALTH CARE EDUCATION/TRAINING PROGRAM

## 2023-10-18 PROCEDURE — 80053 COMPREHEN METABOLIC PANEL: CPT

## 2023-10-18 PROCEDURE — 85025 COMPLETE CBC W/AUTO DIFF WBC: CPT

## 2023-10-18 PROCEDURE — 99285 EMERGENCY DEPT VISIT HI MDM: CPT

## 2023-10-18 RX ORDER — HYDRALAZINE HYDROCHLORIDE 20 MG/ML
10 INJECTION INTRAMUSCULAR; INTRAVENOUS ONCE
Status: COMPLETED | OUTPATIENT
Start: 2023-10-18 | End: 2023-10-18

## 2023-10-18 RX ADMIN — HYDRALAZINE HYDROCHLORIDE 10 MG: 20 INJECTION INTRAMUSCULAR; INTRAVENOUS at 23:34

## 2023-10-18 ASSESSMENT — PATIENT HEALTH QUESTIONNAIRE - PHQ9
1. LITTLE INTEREST OR PLEASURE IN DOING THINGS: 0
SUM OF ALL RESPONSES TO PHQ QUESTIONS 1-9: 0
SUM OF ALL RESPONSES TO PHQ QUESTIONS 1-9: 0
2. FEELING DOWN, DEPRESSED OR HOPELESS: 0
SUM OF ALL RESPONSES TO PHQ QUESTIONS 1-9: 0
SUM OF ALL RESPONSES TO PHQ QUESTIONS 1-9: 0
SUM OF ALL RESPONSES TO PHQ9 QUESTIONS 1 & 2: 0

## 2023-10-18 ASSESSMENT — LIFESTYLE VARIABLES
HOW MANY STANDARD DRINKS CONTAINING ALCOHOL DO YOU HAVE ON A TYPICAL DAY: PATIENT DOES NOT DRINK
HOW OFTEN DO YOU HAVE A DRINK CONTAINING ALCOHOL: NEVER

## 2023-10-18 ASSESSMENT — PAIN - FUNCTIONAL ASSESSMENT: PAIN_FUNCTIONAL_ASSESSMENT: NONE - DENIES PAIN

## 2023-10-19 ENCOUNTER — APPOINTMENT (OUTPATIENT)
Age: 78
DRG: 305 | End: 2023-10-19
Payer: MEDICARE

## 2023-10-19 VITALS
HEIGHT: 60 IN | WEIGHT: 193.12 LBS | HEART RATE: 63 BPM | BODY MASS INDEX: 37.92 KG/M2 | RESPIRATION RATE: 18 BRPM | OXYGEN SATURATION: 95 % | SYSTOLIC BLOOD PRESSURE: 142 MMHG | TEMPERATURE: 98.5 F | DIASTOLIC BLOOD PRESSURE: 75 MMHG

## 2023-10-19 PROBLEM — I10 HYPERTENSION: Status: ACTIVE | Noted: 2023-10-19

## 2023-10-19 PROBLEM — I48.0 HYPERCOAGULABLE STATE DUE TO PAROXYSMAL ATRIAL FIBRILLATION (HCC): Status: ACTIVE | Noted: 2023-10-19

## 2023-10-19 PROBLEM — D68.69 HYPERCOAGULABLE STATE DUE TO PAROXYSMAL ATRIAL FIBRILLATION (HCC): Status: ACTIVE | Noted: 2023-10-19

## 2023-10-19 PROBLEM — R07.9 CHEST PAIN IN ADULT: Status: ACTIVE | Noted: 2023-10-19

## 2023-10-19 LAB
ECHO AO ROOT DIAM: 3.5 CM
ECHO AO ROOT INDEX: 1.9 CM/M2
ECHO AV ACCELERATION TIME: 77.35 MS
ECHO AV CUSP MM: 2.1 CM
ECHO AV MEAN GRADIENT: 6 MMHG
ECHO AV MEAN VELOCITY: 1.1 M/S
ECHO AV PEAK VELOCITY: 1.7 M/S
ECHO AV VTI: 38.7 CM
ECHO BSA: 1.93 M2
ECHO EST RA PRESSURE: 3 MMHG
ECHO LA DIAMETER INDEX: 2.61 CM/M2
ECHO LA DIAMETER: 4.8 CM
ECHO LA MAJOR AXIS: 6.1 CM
ECHO LA TO AORTIC ROOT RATIO: 1.37
ECHO LA VOL 2C: 63 ML (ref 22–52)
ECHO LA VOL 4C: 51 ML (ref 22–52)
ECHO LA VOL BP: 59 ML (ref 22–52)
ECHO LA VOL/BSA BIPLANE: 32 ML/M2 (ref 16–34)
ECHO LA VOLUME AREA LENGTH: 63 ML
ECHO LA VOLUME INDEX A2C: 34 ML/M2 (ref 16–34)
ECHO LA VOLUME INDEX A4C: 28 ML/M2 (ref 16–34)
ECHO LA VOLUME INDEX AREA LENGTH: 34 ML/M2 (ref 16–34)
ECHO LV E' LATERAL VELOCITY: 7 CM/S
ECHO LV EDV A2C: 50 ML
ECHO LV EDV A4C: 76 ML
ECHO LV EDV BP: 62 ML (ref 56–104)
ECHO LV EDV INDEX A4C: 41 ML/M2
ECHO LV EDV INDEX BP: 34 ML/M2
ECHO LV EDV NDEX A2C: 27 ML/M2
ECHO LV EJECTION FRACTION BIPLANE: 66 % (ref 55–100)
ECHO LV ESV A2C: 19 ML
ECHO LV ESV A4C: 24 ML
ECHO LV ESV BP: 21 ML (ref 19–49)
ECHO LV ESV INDEX A2C: 10 ML/M2
ECHO LV ESV INDEX A4C: 13 ML/M2
ECHO LV ESV INDEX BP: 11 ML/M2
ECHO LV FRACTIONAL SHORTENING: 38 % (ref 28–44)
ECHO LV INTERNAL DIMENSION DIASTOLE INDEX: 2.45 CM/M2
ECHO LV INTERNAL DIMENSION DIASTOLIC: 4.5 CM (ref 3.9–5.3)
ECHO LV INTERNAL DIMENSION SYSTOLIC INDEX: 1.52 CM/M2
ECHO LV INTERNAL DIMENSION SYSTOLIC: 2.8 CM
ECHO LV IVSD: 1.2 CM (ref 0.6–0.9)
ECHO LV IVSS: 1.7 CM
ECHO LV MASS 2D: 175 G (ref 67–162)
ECHO LV MASS INDEX 2D: 95.1 G/M2 (ref 43–95)
ECHO LV POSTERIOR WALL DIASTOLIC: 1 CM (ref 0.6–0.9)
ECHO LV POSTERIOR WALL SYSTOLIC: 1.3 CM
ECHO LV RELATIVE WALL THICKNESS RATIO: 0.44
ECHO LVOT AV VTI INDEX: 0.8
ECHO LVOT MEAN GRADIENT: 2 MMHG
ECHO LVOT VTI: 30.8 CM
ECHO MV A VELOCITY: 0.81 M/S
ECHO MV E DECELERATION TIME (DT): 232.1 MS
ECHO MV E VELOCITY: 0.9 M/S
ECHO MV E/A RATIO: 1.11
ECHO MV E/E' LATERAL: 12.86
ECHO PV MAX VELOCITY: 0.8 M/S
ECHO RIGHT VENTRICULAR SYSTOLIC PRESSURE (RVSP): 45 MMHG
ECHO TV REGURGITANT MAX VELOCITY: 3.24 M/S
ECHO TV REGURGITANT PEAK GRADIENT: 42 MMHG
TROPONIN I SERPL HS-MCNC: 15 NG/L (ref 0–14)
TROPONIN I SERPL HS-MCNC: 18 NG/L (ref 0–14)

## 2023-10-19 PROCEDURE — 93005 ELECTROCARDIOGRAM TRACING: CPT | Performed by: STUDENT IN AN ORGANIZED HEALTH CARE EDUCATION/TRAINING PROGRAM

## 2023-10-19 PROCEDURE — 6370000000 HC RX 637 (ALT 250 FOR IP)

## 2023-10-19 PROCEDURE — 84484 ASSAY OF TROPONIN QUANT: CPT

## 2023-10-19 PROCEDURE — 2580000003 HC RX 258

## 2023-10-19 PROCEDURE — G0378 HOSPITAL OBSERVATION PER HR: HCPCS

## 2023-10-19 PROCEDURE — 93306 TTE W/DOPPLER COMPLETE: CPT

## 2023-10-19 PROCEDURE — 93005 ELECTROCARDIOGRAM TRACING: CPT | Performed by: NURSE PRACTITIONER

## 2023-10-19 PROCEDURE — 97162 PT EVAL MOD COMPLEX 30 MIN: CPT

## 2023-10-19 PROCEDURE — 6370000000 HC RX 637 (ALT 250 FOR IP): Performed by: NURSE PRACTITIONER

## 2023-10-19 PROCEDURE — 93306 TTE W/DOPPLER COMPLETE: CPT | Performed by: FAMILY MEDICINE

## 2023-10-19 PROCEDURE — 94761 N-INVAS EAR/PLS OXIMETRY MLT: CPT

## 2023-10-19 PROCEDURE — 36415 COLL VENOUS BLD VENIPUNCTURE: CPT

## 2023-10-19 PROCEDURE — 6370000000 HC RX 637 (ALT 250 FOR IP): Performed by: INTERNAL MEDICINE

## 2023-10-19 PROCEDURE — 97112 NEUROMUSCULAR REEDUCATION: CPT

## 2023-10-19 RX ORDER — ACETAMINOPHEN 650 MG/1
650 SUPPOSITORY RECTAL EVERY 6 HOURS PRN
Status: DISCONTINUED | OUTPATIENT
Start: 2023-10-19 | End: 2023-10-19 | Stop reason: HOSPADM

## 2023-10-19 RX ORDER — HYDROCHLOROTHIAZIDE 25 MG/1
25 TABLET ORAL DAILY
Status: DISCONTINUED | OUTPATIENT
Start: 2023-10-19 | End: 2023-10-19 | Stop reason: HOSPADM

## 2023-10-19 RX ORDER — ASPIRIN 81 MG/1
81 TABLET ORAL DAILY
Status: DISCONTINUED | OUTPATIENT
Start: 2023-10-19 | End: 2023-10-19 | Stop reason: HOSPADM

## 2023-10-19 RX ORDER — METOPROLOL SUCCINATE 50 MG/1
50 TABLET, EXTENDED RELEASE ORAL DAILY
Status: DISCONTINUED | OUTPATIENT
Start: 2023-10-19 | End: 2023-10-19

## 2023-10-19 RX ORDER — SPIRONOLACTONE 25 MG/1
25 TABLET ORAL DAILY
Status: DISCONTINUED | OUTPATIENT
Start: 2023-10-19 | End: 2023-10-19 | Stop reason: HOSPADM

## 2023-10-19 RX ORDER — ASPIRIN 81 MG/1
81 TABLET, CHEWABLE ORAL DAILY
Status: DISCONTINUED | OUTPATIENT
Start: 2023-10-20 | End: 2023-10-19

## 2023-10-19 RX ORDER — SODIUM CHLORIDE 9 MG/ML
INJECTION, SOLUTION INTRAVENOUS PRN
Status: DISCONTINUED | OUTPATIENT
Start: 2023-10-19 | End: 2023-10-19 | Stop reason: HOSPADM

## 2023-10-19 RX ORDER — SODIUM CHLORIDE 0.9 % (FLUSH) 0.9 %
5-40 SYRINGE (ML) INJECTION EVERY 12 HOURS SCHEDULED
Status: DISCONTINUED | OUTPATIENT
Start: 2023-10-19 | End: 2023-10-19 | Stop reason: HOSPADM

## 2023-10-19 RX ORDER — ATORVASTATIN CALCIUM 40 MG/1
40 TABLET, FILM COATED ORAL NIGHTLY
Status: DISCONTINUED | OUTPATIENT
Start: 2023-10-19 | End: 2023-10-19

## 2023-10-19 RX ORDER — ONDANSETRON 4 MG/1
4 TABLET, ORALLY DISINTEGRATING ORAL EVERY 8 HOURS PRN
Status: DISCONTINUED | OUTPATIENT
Start: 2023-10-19 | End: 2023-10-19 | Stop reason: HOSPADM

## 2023-10-19 RX ORDER — CALCIUM CARBONATE 500 MG/1
500 TABLET, CHEWABLE ORAL 2 TIMES DAILY
Status: DISCONTINUED | OUTPATIENT
Start: 2023-10-19 | End: 2023-10-19 | Stop reason: HOSPADM

## 2023-10-19 RX ORDER — SOTALOL HYDROCHLORIDE 80 MG/1
80 TABLET ORAL ONCE
Status: COMPLETED | OUTPATIENT
Start: 2023-10-19 | End: 2023-10-19

## 2023-10-19 RX ORDER — SODIUM CHLORIDE 0.9 % (FLUSH) 0.9 %
5-40 SYRINGE (ML) INJECTION PRN
Status: DISCONTINUED | OUTPATIENT
Start: 2023-10-19 | End: 2023-10-19 | Stop reason: HOSPADM

## 2023-10-19 RX ORDER — POLYETHYLENE GLYCOL 3350 17 G/17G
17 POWDER, FOR SOLUTION ORAL DAILY PRN
Status: DISCONTINUED | OUTPATIENT
Start: 2023-10-19 | End: 2023-10-19 | Stop reason: HOSPADM

## 2023-10-19 RX ORDER — ONDANSETRON 2 MG/ML
4 INJECTION INTRAMUSCULAR; INTRAVENOUS EVERY 6 HOURS PRN
Status: DISCONTINUED | OUTPATIENT
Start: 2023-10-19 | End: 2023-10-19

## 2023-10-19 RX ORDER — METOPROLOL SUCCINATE 25 MG/1
25 TABLET, EXTENDED RELEASE ORAL DAILY
Qty: 30 TABLET | Refills: 3 | Status: SHIPPED | OUTPATIENT
Start: 2023-10-20

## 2023-10-19 RX ORDER — DOCUSATE SODIUM 100 MG/1
100 CAPSULE, LIQUID FILLED ORAL 2 TIMES DAILY
Status: DISCONTINUED | OUTPATIENT
Start: 2023-10-19 | End: 2023-10-19 | Stop reason: HOSPADM

## 2023-10-19 RX ORDER — ROSUVASTATIN CALCIUM 20 MG/1
20 TABLET, COATED ORAL DAILY
Status: DISCONTINUED | OUTPATIENT
Start: 2023-10-19 | End: 2023-10-19

## 2023-10-19 RX ORDER — BUMETANIDE 1 MG/1
1 TABLET ORAL DAILY
Status: DISCONTINUED | OUTPATIENT
Start: 2023-10-19 | End: 2023-10-19 | Stop reason: HOSPADM

## 2023-10-19 RX ORDER — LOSARTAN POTASSIUM 50 MG/1
100 TABLET ORAL DAILY
Status: DISCONTINUED | OUTPATIENT
Start: 2023-10-19 | End: 2023-10-19 | Stop reason: HOSPADM

## 2023-10-19 RX ORDER — LOSARTAN POTASSIUM AND HYDROCHLOROTHIAZIDE 25; 100 MG/1; MG/1
1 TABLET ORAL DAILY
Status: DISCONTINUED | OUTPATIENT
Start: 2023-10-19 | End: 2023-10-19

## 2023-10-19 RX ORDER — METOPROLOL SUCCINATE 25 MG/1
25 TABLET, EXTENDED RELEASE ORAL ONCE
Status: COMPLETED | OUTPATIENT
Start: 2023-10-19 | End: 2023-10-19

## 2023-10-19 RX ORDER — ROSUVASTATIN CALCIUM 20 MG/1
20 TABLET, COATED ORAL NIGHTLY
Status: DISCONTINUED | OUTPATIENT
Start: 2023-10-19 | End: 2023-10-19 | Stop reason: HOSPADM

## 2023-10-19 RX ORDER — ACETAMINOPHEN 325 MG/1
650 TABLET ORAL EVERY 6 HOURS PRN
Status: DISCONTINUED | OUTPATIENT
Start: 2023-10-19 | End: 2023-10-19 | Stop reason: HOSPADM

## 2023-10-19 RX ORDER — SOTALOL HYDROCHLORIDE 80 MG/1
80 TABLET ORAL 2 TIMES DAILY
Status: DISCONTINUED | OUTPATIENT
Start: 2023-10-19 | End: 2023-10-19 | Stop reason: HOSPADM

## 2023-10-19 RX ORDER — SPIRONOLACTONE 25 MG/1
25 TABLET ORAL DAILY
Qty: 30 TABLET | Refills: 3 | Status: SHIPPED | OUTPATIENT
Start: 2023-10-20

## 2023-10-19 RX ORDER — METOPROLOL SUCCINATE 25 MG/1
25 TABLET, EXTENDED RELEASE ORAL DAILY
Status: DISCONTINUED | OUTPATIENT
Start: 2023-10-20 | End: 2023-10-19 | Stop reason: HOSPADM

## 2023-10-19 RX ORDER — PANTOPRAZOLE SODIUM 40 MG/1
40 TABLET, DELAYED RELEASE ORAL
Status: DISCONTINUED | OUTPATIENT
Start: 2023-10-19 | End: 2023-10-19 | Stop reason: HOSPADM

## 2023-10-19 RX ADMIN — DOCUSATE SODIUM 100 MG: 100 CAPSULE, LIQUID FILLED ORAL at 09:58

## 2023-10-19 RX ADMIN — ASPIRIN 81 MG: 81 TABLET, COATED ORAL at 10:00

## 2023-10-19 RX ADMIN — PANTOPRAZOLE SODIUM 40 MG: 40 TABLET, DELAYED RELEASE ORAL at 07:00

## 2023-10-19 RX ADMIN — LOSARTAN POTASSIUM 100 MG: 50 TABLET, FILM COATED ORAL at 10:00

## 2023-10-19 RX ADMIN — SPIRONOLACTONE 25 MG: 25 TABLET, FILM COATED ORAL at 16:19

## 2023-10-19 RX ADMIN — SOTALOL HYDROCHLORIDE 80 MG: 80 TABLET ORAL at 15:49

## 2023-10-19 RX ADMIN — SODIUM CHLORIDE, PRESERVATIVE FREE 10 ML: 5 INJECTION INTRAVENOUS at 10:02

## 2023-10-19 RX ADMIN — RIVAROXABAN 20 MG: 20 TABLET, FILM COATED ORAL at 09:57

## 2023-10-19 RX ADMIN — ACETAMINOPHEN 650 MG: 325 TABLET ORAL at 01:51

## 2023-10-19 RX ADMIN — BUMETANIDE 1 MG: 1 TABLET ORAL at 10:00

## 2023-10-19 RX ADMIN — METOPROLOL SUCCINATE 25 MG: 25 TABLET, EXTENDED RELEASE ORAL at 15:49

## 2023-10-19 RX ADMIN — HYDROCHLOROTHIAZIDE 25 MG: 25 TABLET ORAL at 10:04

## 2023-10-19 RX ADMIN — ANTACID TABLETS 500 MG: 500 TABLET, CHEWABLE ORAL at 09:58

## 2023-10-19 ASSESSMENT — ENCOUNTER SYMPTOMS
ABDOMINAL PAIN: 0
SHORTNESS OF BREATH: 0
VOMITING: 0
RHINORRHEA: 0
NAUSEA: 1
EYE PAIN: 0

## 2023-10-19 ASSESSMENT — PAIN SCALES - GENERAL
PAINLEVEL_OUTOF10: 0
PAINLEVEL_OUTOF10: 0
PAINLEVEL_OUTOF10: 3
PAINLEVEL_OUTOF10: 0

## 2023-10-19 ASSESSMENT — PAIN DESCRIPTION - ORIENTATION: ORIENTATION: MID

## 2023-10-19 ASSESSMENT — PAIN DESCRIPTION - LOCATION: LOCATION: HEAD

## 2023-10-19 ASSESSMENT — PAIN - FUNCTIONAL ASSESSMENT: PAIN_FUNCTIONAL_ASSESSMENT: ACTIVITIES ARE NOT PREVENTED

## 2023-10-19 ASSESSMENT — PAIN DESCRIPTION - DESCRIPTORS: DESCRIPTORS: ACHING;NAGGING

## 2023-10-19 NOTE — PROGRESS NOTES
Reviewed discharge instructions with patient and spouse. Patient aware of need to  prescriptions. Reviewed new medication and side effects to monitor for. Patient aware to call Dr. Cristóbal De La Rosa office tomorrow morning for follow up appointment. Patient aware of date/time of follow up appointment with PCP. Reviewed with patient to follow a cardiac diet. Educational handout information on low sodium diet and how to read a food label given to patient. Patient denies questions. Verbalizes understanding. Copy of discharge instructions given to patient.

## 2023-10-19 NOTE — PROGRESS NOTES
Patient admitted to [facility] Room 317 via wheelchair. Assessment, vitals and navigator completed at this time as charted. Pt is A&O x4. Pt oriented to room and call light and denies any further needs at this time. Call light within reach, care ongoing.

## 2023-10-19 NOTE — CARE COORDINATION
Case Management Assessment  Initial Evaluation    Date/Time of Evaluation: 10/19/2023 9:40 AM  Assessment Completed by: MANUEL Falcon    If patient is discharged prior to next notation, then this note serves as note for discharge by case management. Patient Name: Jessenia Jordan                   YOB: 1945  Diagnosis: Lightheadedness [R42]  Chest pain in adult [R07.9]  Hypervolemia, unspecified hypervolemia type [E87.70]  Chest pain, unspecified type [R07.9]                   Date / Time: 10/18/2023 10:35 PM    Patient Admission Status: Observation   Readmission Risk (Low < 19, Mod (19-27), High > 27): No data recorded  Current PCP: Darian Weber, DO  PCP verified by CM? Yes    Chart Reviewed: Yes      History Provided by: Patient  Patient Orientation: Alert and Oriented, Person, Place, Situation, Self    Patient Cognition: Alert    Hospitalization in the last 30 days (Readmission):  No    If yes, Readmission Assessment in CM Navigator will be completed. Advance Directives:      Code Status: Full Code   Patient's Primary Decision Maker is: Legal Next of Kin    Primary Decision MakerSharon Haw - [phone]    Discharge Planning:    Patient lives with: Spouse/Significant Other Type of Home: House  Primary Care Giver: Self  Patient Support Systems include: Spouse/Significant Other, Family Members   Current Financial resources: Medicare  Current community resources: None  Current services prior to admission: None            Current DME:              Type of Home Care services:  None    ADLS  Prior functional level: Independent in ADLs/IADLs  Current functional level: Independent in ADLs/IADLs    PT AM-PAC:   /24  OT AM-PAC:   /24    Family can provide assistance at DC: Yes  Would you like Case Management to discuss the discharge plan with any other family members/significant others, and if so, who?  Yes  Plans to Return to Present Housing: Yes  Other Identified Issues/Barriers to

## 2023-10-19 NOTE — PROGRESS NOTES
Patient discharged, via wheelchair, to personal vehicle with spouse at this time. All belongings in hand.

## 2023-10-19 NOTE — ED PROVIDER NOTES
[address] [facility] ED  EMERGENCY DEPARTMENT ENCOUNTER      Pt Name: Jackquline Severs  MRN: [id number]  [address] [facility] 1945  Date of evaluation: 10/18/2023  Provider: Jorge Alberto Lyon MD     [address]       Chief Complaint   Patient presents with    Dizziness     Patient/squad reports approximately 45 minutes ago patient was watching TV and felt a hot flash come on with onset of dizziness and nausea. HISTORY OF PRESENT ILLNESS   (Location/Symptom, Timing/Onset, Context/Setting, Quality, Duration, Modifying Factors, Severity) Note limiting factors. HPI    Jackquline Severs is a 66 y.o. female with a past medical history significant for coronary artery disease, and hyperlipidemia who presents to the emergency department for evaluation for an episode of diaphoresis with lightheadedness that occurred at home approximately 30 minutes prior to presentation. Patient states that it came out of nowhere. She states that she could hardly stand as she is afraid she will pass out and fall. Patient states symptoms are accompanied by nausea but no vomiting. She states that at the time of my evaluation she still had mild nausea but no other symptoms. Nursing Notes were reviewed. REVIEW OF SYSTEMS    (2+ for level 4; 10+ for level 5)   Review of Systems   Constitutional:  Positive for diaphoresis. Negative for activity change and unexpected weight change. HENT:  Negative for congestion and rhinorrhea. Eyes:  Negative for pain and visual disturbance. Respiratory:  Negative for shortness of breath. Cardiovascular:  Positive for chest pain. Negative for palpitations. Gastrointestinal:  Positive for nausea. Negative for abdominal pain and vomiting. Genitourinary:  Negative for decreased urine volume and hematuria. Musculoskeletal:  Negative for arthralgias and myalgias. Skin:  Negative for wound. Neurological:  Positive for light-headedness. Negative for weakness.    Psychiatric/Behavioral:

## 2023-10-19 NOTE — PROGRESS NOTES
Nutrition Assessment     Type and Reason for Visit: Initial    Nutrition Recommendations/Plan:   Continue current diet. Malnutrition Assessment:  Malnutrition Status: Insufficient data    Nutrition Assessment:  Overweight/obesity r/t excess energy intakes aeb BMI 37.72. Pt with Dr Jose Luis Crawford. Pt admitted with chest pain. Attach 2 gm sodium diet information to d/c. Nutrition Related Findings:     Wound Type: None    Current Nutrition Therapies:    ADULT DIET; Regular; Low Sodium (2 gm);  No Caffeine    Anthropometric Measures:  Height: 152.4 cm (5')  Current Body Wt: 87.6 kg (193 lb 2 oz)   BMI: 37.7  Hematology:  Recent Labs     10/18/23  2235   WBC 6.7   HGB 14.2   HCT 41.5     Chemistry:  Recent Labs     10/18/23  2235      K 3.8      CO2 27   GLUCOSE 106*   BUN 13   CREATININE 0.6   CALCIUM 9.5     Recent Labs     10/18/23  2235   PROT 7.2   LABALBU 4.7   AST 24   ALT 21   ALKPHOS 52   BILITOT 0.4     Lab Results   Component Value Date/Time    TRIG 117 06/07/2023 12:07 PM    HDL 45 06/07/2023 12:07 PM        Nutrition Diagnosis:   Overweight/Obese related to excessive energy intake as evidenced by BMI    Nutrition Interventions:   Food and/or Nutrient Delivery: Continue Current Diet  Nutrition Education/Counseling: No recommendation at this time  Coordination of Nutrition Care: Continue to monitor while inpatient  Plan of Care discussed with: no one    Goals:     Goals: Meet at least 75% of estimated needs       Nutrition Monitoring and Evaluation:   Behavioral-Environmental Outcomes: None Identified  Food/Nutrient Intake Outcomes: Food and Nutrient Intake  Physical Signs/Symptoms Outcomes: Biochemical Data, Weight    Discharge Planning:    Continue current diet     Chantel Arroyo [id number] [facility]  Contact: [phone]

## 2023-10-19 NOTE — DISCHARGE SUMMARY
Discharge Summary    Tessa Pro  :  1945  MRN:  [id number]    Admit date:  10/18/2023      Discharge date: 10/19/2023     Admitting Physician:  Luisito Bowen MD    Discharge Diagnoses:    Principal Problem:    Chest pain in adult  Active Problems:    Atrial fibrillation [facility])    Hypertension    Hypercoagulable state due to paroxysmal atrial fibrillation [facility])  Resolved Problems:    * No resolved hospital problems. [facility] Course: Tessa Pro is a 66 y.o. female admitted with chest pain. She presented with complaints of lightheadedness and nausea. She states she was watching TV when she suddenly became lightheaded and diaphoretic. She developed a hot feeling in her chest and became nauseated. She states her symptoms felt similar to when she had a heart attack 2 years ago. Symptoms improved by the time she came to the hospital.  During her time in the emergency room she was found to be hypertensive with systolic blood pressure in the 200s and was given hydralazine which improved to 180s. She does have history of CAD hyperlipidemia hypertension NSTEMI and PAF. Initial troponin was 17 followed by 15. BNP was 908. She denied any shortness of breath or cough. Denied any GI or  symptoms otherwise. Patient was admitted placed on telemetry and monitored. She did have some bradycardia into the 50s but asymptomatic with that. Her Toprol-XL was decreased to 25 mg and will continue on discharge as well as with sotalol. She did have Aldactone 25 mg added daily per cardiology. She was evaluated by PT for vertigo and was negative. Orthostatic vital signs were negative. She is tolerating diet and activity well. Plan will be to discharge today she will follow-up with primary care and cardiology as an outpatient. Consultants:  Dr. Myles Bejarano, cardiology    Procedures: none    Complications: none    Discharge Condition: fair    Exam:  GEN:    Awake, alert and oriented x3.    EYES:   EOMI, pupils

## 2023-10-19 NOTE — DISCHARGE INSTR - DIET
Good nutrition is important when healing from an illness, injury, or surgery. Follow any nutrition recommendations given to you during your hospital stay. If you were given an oral nutrition supplement while in the hospital, continue to take this supplement at home. You can take it with meals, in-between meals, and/or before bedtime. These supplements can be purchased at most local grocery stores, pharmacies, and chain [employer]-stores. If you have any questions about your diet or nutrition, call the hospital and ask for the dietitian.   Cardiac:  low salt, low fat, low cholesterol

## 2023-10-19 NOTE — H&P
History and Physical    Patient:  Jackquline Severs  MRN: [id number]    Chief Complaint:  lightheadedness    History Obtained From:  patient, spouse, electronic medical record    PCP: Do FERREIRA DO    History of Present Illness: The patient is a 66 y.o. female who presents with lightheadedness and nausea. Patient was watching TV when she suddenly became lightheaded and diaphoretic. She developed a \"hot feeling\" in her chest and became nauseated. She states her symptoms felt similar to when she had a heart attack 2 years ago. Her symptoms had improved by the time she arrived at the hospital.  In the emergency department she was found to be hypertensive with a systolic blood pressure in the 200s. She was given hydralazine and her systolic blood pressure decreased to the 180s. History includes coronary artery disease, hyperlipidemia, hypertension, NSTEMI, paroxysmal atrial fibrillation. Initial troponin 17 and 15, proBNP 908. She currently denies any chest pain or shortness of breath. She denies any abdominal pain and states her lightheadedness has decreased significantly. Past Medical History:        Diagnosis Date    CAD (coronary artery disease)     Hyperlipidemia     Hypertension        Past Surgical History:        Procedure Laterality Date    HYSTERECTOMY (CERVIX STATUS UNKNOWN)      JOINT REPLACEMENT Bilateral        Medications Prior to Admission:    Prior to Admission medications    Medication Sig Start Date End Date Taking?  Authorizing Provider   bumetanide (BUMEX) 1 MG tablet Take 1 tablet by mouth daily 8/16/23   Aron Monzon MD   sotalol (BETAPACE) 80 MG tablet Take 1 tablet by mouth 2 times daily 4/6/23   Aron Monzon MD   losartan-hydroCHLOROthiazide [facility]) 100-25 MG per tablet Take 1 tablet by mouth daily 1/11/23   Aron Monzon MD   XARELTO 20 MG TABS tablet Take 1 tablet by mouth daily (with breakfast) 1/11/23   Aron Monzon MD   Artificial Tear Ointment (DRY EYES OP) Apply 2

## 2023-10-19 NOTE — PLAN OF CARE
Problem: Discharge Planning  Goal: Discharge to home or other facility with appropriate resources  Outcome: Progressing     Problem: Pain  Goal: Verbalizes/displays adequate comfort level or baseline comfort level  Outcome: Progressing     Problem: Chronic Conditions and Co-morbidities  Goal: Patient's chronic conditions and co-morbidity symptoms are monitored and maintained or improved  Outcome: Progressing
10/19/2023 1238)  Free From Fall Injury: Instruct family/caregiver on patient safety
Completed  10/19/2023 1238 by Jj Kohler, RN  Outcome: Progressing  Flowsheets (Taken 10/19/2023 1238)  Free From Fall Injury: Instruct family/caregiver on patient safety

## 2023-10-19 NOTE — PROGRESS NOTES
Physical Therapy  Facility/Department: [facility] MED SURG  Physical Therapy Initial Assessment    Name: Komal Portillo  : 1945  MRN: [id number]  Date of Service: 10/19/2023    Discharge Recommendations:  Continue to assess pending progress   PT Equipment Recommendations  Equipment Needed: No      Patient Diagnosis(es): The primary encounter diagnosis was Lightheadedness. Diagnoses of Chest pain, unspecified type, Hypervolemia, unspecified hypervolemia type, and Chest pain in adult were also pertinent to this visit. Past Medical History:  has a past medical history of CAD (coronary artery disease), Hyperlipidemia, and Hypertension. Past Surgical History:  has a past surgical history that includes Hysterectomy and joint replacement (Bilateral). Assessment   Body Structures, Functions, Activity Limitations Requiring Skilled Therapeutic Intervention: Decreased functional mobility ; Decreased ADL status; Decreased strength;Decreased endurance;Decreased balance;Decreased high-level IADLs  Assessment: The patient is a 66 y.o. female who was admitted due to lightheadedness. She reports her symptoms started yesterday evening when she became very dizzy for about a couple of minutes while watching TV. She reports she has felt much better today and hasn't had any symptoms. The patient does demonstrates some LE weakness, and ambulates with slow mukesh and wide SHASTA. She was examined for BPPV with spontaneous and gaze holding nystagmus appearing negative. Bilateral [place] and bilateral roll testing was negative for nystagmus and symptoms. She did report feeling lightheaded upon sitting up from [place] position, but denied vertigo. She would benefit from skilled PT to improve LE strength, endurance, and balance while hospitalized. Treatment Diagnosis: Impaired balance, decreased activity endurance.   Therapy Prognosis: Good  Decision Making: Medium Complexity  Requires PT Follow-Up: Yes  Activity

## 2023-10-20 PROBLEM — I25.10 CORONARY ARTERY DISEASE INVOLVING NATIVE CORONARY ARTERY OF NATIVE HEART WITHOUT ANGINA PECTORIS: Status: ACTIVE | Noted: 2023-10-20

## 2023-10-20 PROBLEM — R42 LIGHTHEADEDNESS: Status: ACTIVE | Noted: 2023-10-20

## 2023-10-20 PROBLEM — H81.10 BPV (BENIGN POSITIONAL VERTIGO), UNSPECIFIED LATERALITY: Status: ACTIVE | Noted: 2023-10-20

## 2023-10-20 NOTE — CONSULTS
Ricci Corbett am scribing for and in the presence of Colonel Caden BIRMINGH[place], MS, F.A.C.C. Patient: Yuriy Augustine  : 1945  Date of Visit: 2023    REASON FOR VISIT / CONSULTATION: Dizziness (Patient/squad reports approximately 45 minutes ago patient was watching TV and felt a hot flash come on with onset of dizziness and nausea.)      History of Present Illness:        I had the pleasure of seeing Yuriy Augustine in hospital consultation today. Ms. Mike Hargrove is a 66 y.o. female with a history of recently diagnosied atherosclerotic heart disease including a NSTEMI in 2021 and atrial fibrillation. She came into the ER on 2021 due to chest pains and heart palpitations. She was then told she was having a heart attack at that time and was sent to [facility] in [place]. She had no previous heart history prior to this episode. She does have two sisters who have a history of atrial fibrillation. She did not know she was in atrial fibrillation at all when she came to the ER. Her chest discomfort started like a pressure feeling and then she felt like a heat sensation in her face and her pain did radiate into her back at that time as well. While at [facility] in [place] she did have a heart cath done and the picture is scanned into media. She did not need any stents at that time however her medications were changed of course at that time. The cardiologist in [place] did tell her the heart was strong. She did have an Echo done in [place] however she was never told about her heart strength. She has been on Bumex for many years due to leg swelling in the past. EF 55-70% on 2021 echo. She had Holter monitor done on 10/11/2021 1. The rhythm was sinus. Average [place] interval 0.16 average QRS duration 0.08. Intermittent atrial fibrillation for 6% (82  minutes) test duration. 2. No symptoms noted. Frequent PAC's and frequent PVC's. Echo performed 2021: Ejection fraction of 60%.  The left ventricular wall

## 2023-10-21 LAB
EKG ATRIAL RATE: 56 BPM
EKG ATRIAL RATE: 92 BPM
EKG P AXIS: 31 DEGREES
EKG P-R INTERVAL: 144 MS
EKG P-R INTERVAL: 158 MS
EKG Q-T INTERVAL: 422 MS
EKG Q-T INTERVAL: 482 MS
EKG QRS DURATION: 96 MS
EKG QRS DURATION: 98 MS
EKG QTC CALCULATION (BAZETT): 465 MS
EKG QTC CALCULATION (BAZETT): 521 MS
EKG R AXIS: -20 DEGREES
EKG R AXIS: -23 DEGREES
EKG T AXIS: 1 DEGREES
EKG T AXIS: 23 DEGREES
EKG VENTRICULAR RATE: 56 BPM
EKG VENTRICULAR RATE: 92 BPM

## 2023-10-21 PROCEDURE — 93010 ELECTROCARDIOGRAM REPORT: CPT | Performed by: INTERNAL MEDICINE

## 2023-10-27 ENCOUNTER — HOSPITAL ENCOUNTER (OUTPATIENT)
Age: 78
Discharge: HOME | End: 2023-10-27
Payer: MEDICARE

## 2023-10-27 LAB
ANION GAP SERPL CALCULATED.3IONS-SCNC: 8 MMOL/L (ref 9–17)
BUN SERPL-MCNC: 13 MG/DL (ref 8–23)
BUN/CREAT SERPL: 22 (ref 9–20)
CALCIUM SERPL-MCNC: 9.6 MG/DL (ref 8.6–10.4)
CHLORIDE SERPL-SCNC: 97 MMOL/L (ref 98–107)
CO2 SERPL-SCNC: 30 MMOL/L (ref 20–31)
CREAT SERPL-MCNC: 0.6 MG/DL (ref 0.5–0.9)
GFR SERPL CREATININE-BSD FRML MDRD: >60 ML/MIN/1.73M2
GLUCOSE SERPL-MCNC: 125 MG/DL (ref 70–99)
POTASSIUM SERPL-SCNC: 4.1 MMOL/L (ref 3.7–5.3)
SODIUM SERPL-SCNC: 135 MMOL/L (ref 135–144)

## 2023-10-27 PROCEDURE — 80048 BASIC METABOLIC PNL TOTAL CA: CPT

## 2023-10-27 PROCEDURE — 36415 COLL VENOUS BLD VENIPUNCTURE: CPT

## 2023-11-08 ENCOUNTER — OFFICE VISIT (OUTPATIENT)
Dept: CARDIOLOGY | Age: 78
End: 2023-11-08
Payer: MEDICARE

## 2023-11-08 VITALS
DIASTOLIC BLOOD PRESSURE: 66 MMHG | BODY MASS INDEX: 36.91 KG/M2 | OXYGEN SATURATION: 97 % | WEIGHT: 188 LBS | SYSTOLIC BLOOD PRESSURE: 132 MMHG | RESPIRATION RATE: 18 BRPM | HEART RATE: 62 BPM | HEIGHT: 60 IN

## 2023-11-08 DIAGNOSIS — E78.2 MIXED HYPERLIPIDEMIA: ICD-10-CM

## 2023-11-08 DIAGNOSIS — I48.0 PAF (PAROXYSMAL ATRIAL FIBRILLATION) (HCC): ICD-10-CM

## 2023-11-08 DIAGNOSIS — Z51.81 ENCOUNTER FOR MONITORING SOTALOL THERAPY: ICD-10-CM

## 2023-11-08 DIAGNOSIS — I25.2 HISTORY OF MI (MYOCARDIAL INFARCTION): ICD-10-CM

## 2023-11-08 DIAGNOSIS — Z79.01 CHRONIC ANTICOAGULATION: ICD-10-CM

## 2023-11-08 DIAGNOSIS — I25.10 ASHD (ARTERIOSCLEROTIC HEART DISEASE): Primary | ICD-10-CM

## 2023-11-08 DIAGNOSIS — Z79.899 ENCOUNTER FOR MONITORING SOTALOL THERAPY: ICD-10-CM

## 2023-11-08 DIAGNOSIS — I10 PRIMARY HYPERTENSION: ICD-10-CM

## 2023-11-08 PROCEDURE — 99211 OFF/OP EST MAY X REQ PHY/QHP: CPT | Performed by: PHYSICIAN ASSISTANT

## 2023-11-08 RX ORDER — AMOXICILLIN 875 MG/1
TABLET, COATED ORAL
COMMUNITY
Start: 2023-11-07

## 2023-11-08 RX ORDER — METHYLPREDNISOLONE 4 MG/1
TABLET ORAL
COMMUNITY
Start: 2023-11-07

## 2023-11-08 NOTE — PROGRESS NOTES
Patient: Mamadou Roe  : 1945  Date of Visit: 2023    REASON FOR VISIT / CONSULTATION: Coronary Artery Disease (HX:CAD, HTN, Pt is here for hosp follow up on 10/18 she states BP is better she has record of log denies:CP, sob, lightheaded/dizziness,palp )      History of Present Illness:        Dear Stefanie Sanchez,     I had the pleasure of seeing Mamadou Roe in my office today. Ms. Alize Alcantar is a 66 y.o. female with a history of recently diagnosied atherosclerotic heart disease including a NSTEMI in 2021 and atrial fibrillation. She came into the ER on 2021 due to chest pains and heart palpitations. She was then told she was having a heart attack at that time and was sent to [facility] in [place]. She had no previous heart history prior to this episode. She does have two sisters who have a history of atrial fibrillation. She did not know she was in atrial fibrillation at all when she came to the ER. Her chest discomfort started like a pressure feeling and then she felt like a heat sensation in her face and her pain did radiate into her back at that time as well. While at [facility] in [place] she did have a heart cath done and the picture is scanned into media. She did not need any stents at that time however her medications were changed of course at that time. The cardiologist in [place] did tell her the heart was strong. She did have an Echo done in [place] however she was never told about her heart strength. She has been on Bumex for many years due to leg swelling in the past. EF 55-70% on 2021 echo. She had Holter monitor done on 10/11/2021 1. The rhythm was sinus. Average [place] interval 0.16 average QRS duration 0.08. Intermittent atrial fibrillation for 6% (82  minutes) test duration. 2. No symptoms noted. Frequent PAC's and frequent PVC's. Echo performed 2021: Ejection fraction of 60%. The left ventricular wall thickness is mildly increased.  The left

## 2023-11-08 NOTE — PATIENT INSTRUCTIONS
SURVEY:    You may be receiving a survey from [employer] regarding your visit today. Please complete the survey to enable us to provide the highest quality of care to you and your family. If you cannot score us a very good on any question, please call the office to discuss how we could have made your experience a very good one. Thank you.

## 2023-11-28 ENCOUNTER — HOSPITAL ENCOUNTER (OUTPATIENT)
Dept: NUCLEAR MEDICINE | Age: 78
Discharge: HOME | End: 2023-11-30
Payer: MEDICARE

## 2023-11-28 ENCOUNTER — HOSPITAL ENCOUNTER (OUTPATIENT)
Age: 78
Discharge: HOME | End: 2023-11-30
Payer: MEDICARE

## 2023-11-28 DIAGNOSIS — I48.0 PAF (PAROXYSMAL ATRIAL FIBRILLATION) (HCC): ICD-10-CM

## 2023-11-28 DIAGNOSIS — Z79.899 ENCOUNTER FOR MONITORING SOTALOL THERAPY: ICD-10-CM

## 2023-11-28 DIAGNOSIS — Z79.01 CHRONIC ANTICOAGULATION: ICD-10-CM

## 2023-11-28 DIAGNOSIS — E78.2 MIXED HYPERLIPIDEMIA: ICD-10-CM

## 2023-11-28 DIAGNOSIS — I25.10 ASHD (ARTERIOSCLEROTIC HEART DISEASE): ICD-10-CM

## 2023-11-28 DIAGNOSIS — Z51.81 ENCOUNTER FOR MONITORING SOTALOL THERAPY: ICD-10-CM

## 2023-11-28 DIAGNOSIS — I10 PRIMARY HYPERTENSION: ICD-10-CM

## 2023-11-28 DIAGNOSIS — I25.2 HISTORY OF MI (MYOCARDIAL INFARCTION): ICD-10-CM

## 2023-11-28 PROCEDURE — 6360000002 HC RX W HCPCS: Performed by: FAMILY MEDICINE

## 2023-11-28 PROCEDURE — 3430000000 HC RX DIAGNOSTIC RADIOPHARMACEUTICAL: Performed by: PHYSICIAN ASSISTANT

## 2023-11-28 PROCEDURE — A9500 TC99M SESTAMIBI: HCPCS | Performed by: PHYSICIAN ASSISTANT

## 2023-11-28 PROCEDURE — 78452 HT MUSCLE IMAGE SPECT MULT: CPT

## 2023-11-28 RX ORDER — TETRAKIS(2-METHOXYISOBUTYLISOCYANIDE)COPPER(I) TETRAFLUOROBORATE 1 MG/ML
30 INJECTION, POWDER, LYOPHILIZED, FOR SOLUTION INTRAVENOUS
Status: COMPLETED | OUTPATIENT
Start: 2023-11-28 | End: 2023-11-28

## 2023-11-28 RX ORDER — REGADENOSON 0.08 MG/ML
0.4 INJECTION, SOLUTION INTRAVENOUS
Status: COMPLETED | OUTPATIENT
Start: 2023-11-28 | End: 2023-11-28

## 2023-11-28 RX ADMIN — REGADENOSON 0.4 MG: 0.08 INJECTION, SOLUTION INTRAVENOUS at 09:31

## 2023-11-28 RX ADMIN — Medication 30 MILLICURIE: at 09:20

## 2023-11-29 ENCOUNTER — HOSPITAL ENCOUNTER (OUTPATIENT)
Dept: NUCLEAR MEDICINE | Age: 78
Discharge: HOME | End: 2023-12-01
Payer: MEDICARE

## 2023-11-29 LAB
NUC STRESS EJECTION FRACTION: 78 %
STRESS BASELINE DIAS BP: 78 MMHG
STRESS BASELINE HR: 63 BPM
STRESS BASELINE ST DEPRESSION: 0 MM
STRESS BASELINE SYS BP: 148 MMHG
STRESS ESTIMATED WORKLOAD: 7 METS
STRESS EXERCISE DUR MIN: 5 MIN
STRESS EXERCISE DUR SEC: 27 SEC
STRESS PEAK DIAS BP: 84 MMHG
STRESS PEAK SYS BP: 172 MMHG
STRESS PERCENT HR ACHIEVED: 73 %
STRESS POST PEAK HR: 103 BPM
STRESS RATE PRESSURE PRODUCT: NORMAL BPM*MMHG
STRESS STAGE 1 BP: NORMAL MMHG
STRESS STAGE 1 DURATION: 3 MIN:SEC
STRESS STAGE 1 HR: 82 BPM
STRESS STAGE RECOVERY 1 BP: NORMAL MMHG
STRESS STAGE RECOVERY 1 DURATION: 0 MIN:SEC
STRESS STAGE RECOVERY 1 HR: 103 BPM
STRESS STAGE RECOVERY 2 DURATION: 1 MIN:SEC
STRESS STAGE RECOVERY 2 HR: 90 BPM
STRESS STAGE RECOVERY 3 BP: NORMAL MMHG
STRESS STAGE RECOVERY 3 DURATION: 3 MIN:SEC
STRESS STAGE RECOVERY 3 HR: 81 BPM
STRESS STAGE RECOVERY 4 BP: NORMAL MMHG
STRESS STAGE RECOVERY 4 DURATION: 5 MIN:SEC
STRESS STAGE RECOVERY 4 HR: 78 BPM
STRESS TARGET HR: 142 BPM
TID: 1.23

## 2023-11-29 PROCEDURE — 93018 CV STRESS TEST I&R ONLY: CPT | Performed by: INTERNAL MEDICINE

## 2023-11-29 PROCEDURE — 93016 CV STRESS TEST SUPVJ ONLY: CPT | Performed by: INTERNAL MEDICINE

## 2023-11-29 PROCEDURE — 3430000000 HC RX DIAGNOSTIC RADIOPHARMACEUTICAL: Performed by: PHYSICIAN ASSISTANT

## 2023-11-29 PROCEDURE — A9500 TC99M SESTAMIBI: HCPCS | Performed by: PHYSICIAN ASSISTANT

## 2023-11-29 PROCEDURE — 78452 HT MUSCLE IMAGE SPECT MULT: CPT | Performed by: INTERNAL MEDICINE

## 2023-11-29 RX ORDER — TETRAKIS(2-METHOXYISOBUTYLISOCYANIDE)COPPER(I) TETRAFLUOROBORATE 1 MG/ML
30 INJECTION, POWDER, LYOPHILIZED, FOR SOLUTION INTRAVENOUS
Status: COMPLETED | OUTPATIENT
Start: 2023-11-29 | End: 2023-11-29

## 2023-11-29 RX ADMIN — Medication 30 MILLICURIE: at 13:43

## 2023-11-30 ENCOUNTER — OFFICE VISIT (OUTPATIENT)
Dept: CARDIOLOGY | Age: 78
End: 2023-11-30
Payer: MEDICARE

## 2023-11-30 ENCOUNTER — TELEPHONE (OUTPATIENT)
Dept: CARDIOLOGY | Age: 78
End: 2023-11-30

## 2023-11-30 VITALS
BODY MASS INDEX: 36.71 KG/M2 | HEIGHT: 60 IN | RESPIRATION RATE: 18 BRPM | SYSTOLIC BLOOD PRESSURE: 160 MMHG | WEIGHT: 187 LBS | DIASTOLIC BLOOD PRESSURE: 81 MMHG | OXYGEN SATURATION: 98 % | HEART RATE: 60 BPM

## 2023-11-30 DIAGNOSIS — Z51.81 ENCOUNTER FOR MONITORING SOTALOL THERAPY: ICD-10-CM

## 2023-11-30 DIAGNOSIS — E78.2 MIXED HYPERLIPIDEMIA: ICD-10-CM

## 2023-11-30 DIAGNOSIS — I25.10 CORONARY ARTERY DISEASE INVOLVING NATIVE CORONARY ARTERY OF NATIVE HEART WITHOUT ANGINA PECTORIS: ICD-10-CM

## 2023-11-30 DIAGNOSIS — R94.39 ABNORMAL STRESS TEST: Primary | ICD-10-CM

## 2023-11-30 DIAGNOSIS — I25.2 HISTORY OF MI (MYOCARDIAL INFARCTION): ICD-10-CM

## 2023-11-30 DIAGNOSIS — I10 ESSENTIAL HYPERTENSION: ICD-10-CM

## 2023-11-30 DIAGNOSIS — Z79.01 CHRONIC ANTICOAGULATION: ICD-10-CM

## 2023-11-30 DIAGNOSIS — I48.0 PAF (PAROXYSMAL ATRIAL FIBRILLATION) (HCC): ICD-10-CM

## 2023-11-30 DIAGNOSIS — Z79.899 ENCOUNTER FOR MONITORING SOTALOL THERAPY: ICD-10-CM

## 2023-11-30 PROCEDURE — 1090F PRES/ABSN URINE INCON ASSESS: CPT | Performed by: PHYSICIAN ASSISTANT

## 2023-11-30 PROCEDURE — G8400 PT W/DXA NO RESULTS DOC: HCPCS | Performed by: PHYSICIAN ASSISTANT

## 2023-11-30 PROCEDURE — 1123F ACP DISCUSS/DSCN MKR DOCD: CPT | Performed by: PHYSICIAN ASSISTANT

## 2023-11-30 PROCEDURE — 1036F TOBACCO NON-USER: CPT | Performed by: PHYSICIAN ASSISTANT

## 2023-11-30 PROCEDURE — 99214 OFFICE O/P EST MOD 30 MIN: CPT | Performed by: PHYSICIAN ASSISTANT

## 2023-11-30 PROCEDURE — G8417 CALC BMI ABV UP PARAM F/U: HCPCS | Performed by: PHYSICIAN ASSISTANT

## 2023-11-30 PROCEDURE — 3079F DIAST BP 80-89 MM HG: CPT | Performed by: PHYSICIAN ASSISTANT

## 2023-11-30 PROCEDURE — G8427 DOCREV CUR MEDS BY ELIG CLIN: HCPCS | Performed by: PHYSICIAN ASSISTANT

## 2023-11-30 PROCEDURE — 3077F SYST BP >= 140 MM HG: CPT | Performed by: PHYSICIAN ASSISTANT

## 2023-11-30 PROCEDURE — G8484 FLU IMMUNIZE NO ADMIN: HCPCS | Performed by: PHYSICIAN ASSISTANT

## 2023-11-30 PROCEDURE — 99211 OFF/OP EST MAY X REQ PHY/QHP: CPT | Performed by: PHYSICIAN ASSISTANT

## 2023-11-30 RX ORDER — ZINC GLUCONATE 50 MG
50 TABLET ORAL DAILY
COMMUNITY

## 2023-11-30 NOTE — TELEPHONE ENCOUNTER
----- Message from Shawna Ascencio PA-C sent at 11/30/2023  9:28 AM EST -----  Please let them know that their stress test was abnromal. Please make follow up in next 1 to 2 weeks. I think I have time today if she can come in. Thanks.

## 2023-11-30 NOTE — RESULT ENCOUNTER NOTE
Please let them know that their stress test was abnromal. Please make follow up in next 1 to 2 weeks. I think I have time today if she can come in. Thanks.

## 2023-11-30 NOTE — PROGRESS NOTES
previously scheduled. FOLLOW UP:   I told Ms. Mike Cardona to call my office if she had any problems, but otherwise I asked her to Return in about 3 months (around 2/29/2024). However, I would be happy to see her sooner should the need arise. Sincerely,  Jhoana Simon  [facility] Cardiology Specialist    [address] [place] [address], [address] [place] [address] [address]  Phone: [phone], Fax: [phone]     I believe that the risk of significant morbidity and mortality related to the patient's current medical conditions are: Intermediate. 30 minutes    The documentation recorded by the scribe, accurately and completely reflects the services I personally performed and the decisions made by me.  Shawna Ascencio PA-C November 30, 2023

## 2023-11-30 NOTE — PATIENT INSTRUCTIONS
SURVEY:    You may be receiving a survey from [employer] regarding your visit today. Please complete the survey to enable us to provide the highest quality of care to you and your family. If you cannot score us a very good on any question, please call the office to discuss how we could have made your experience a very good one. Thank you.

## 2024-01-09 DIAGNOSIS — I48.0 PAF (PAROXYSMAL ATRIAL FIBRILLATION) (HCC): ICD-10-CM

## 2024-01-09 DIAGNOSIS — Z51.81 ENCOUNTER FOR MONITORING SOTALOL THERAPY: ICD-10-CM

## 2024-01-09 DIAGNOSIS — Z79.01 CHRONIC ANTICOAGULATION: ICD-10-CM

## 2024-01-09 DIAGNOSIS — Z79.899 ENCOUNTER FOR MONITORING SOTALOL THERAPY: ICD-10-CM

## 2024-01-09 DIAGNOSIS — I10 PRIMARY HYPERTENSION: ICD-10-CM

## 2024-01-09 DIAGNOSIS — I25.2 HISTORY OF MI (MYOCARDIAL INFARCTION): ICD-10-CM

## 2024-01-09 DIAGNOSIS — E78.2 MIXED HYPERLIPIDEMIA: ICD-10-CM

## 2024-01-09 DIAGNOSIS — I25.10 ASHD (ARTERIOSCLEROTIC HEART DISEASE): ICD-10-CM

## 2024-01-09 RX ORDER — BUMETANIDE 1 MG/1
1 TABLET ORAL DAILY
Qty: 90 TABLET | Refills: 3 | Status: SHIPPED | OUTPATIENT
Start: 2024-01-09

## 2024-01-09 RX ORDER — LOSARTAN POTASSIUM AND HYDROCHLOROTHIAZIDE 25; 100 MG/1; MG/1
1 TABLET ORAL DAILY
Qty: 90 TABLET | Refills: 3 | Status: SHIPPED | OUTPATIENT
Start: 2024-01-09

## 2024-01-09 RX ORDER — METOPROLOL SUCCINATE 25 MG/1
25 TABLET, EXTENDED RELEASE ORAL DAILY
Qty: 90 TABLET | Refills: 3 | Status: SHIPPED | OUTPATIENT
Start: 2024-01-09

## 2024-01-09 RX ORDER — RIVAROXABAN 20 MG/1
20 TABLET, FILM COATED ORAL
Qty: 90 TABLET | Refills: 3 | Status: SHIPPED | OUTPATIENT
Start: 2024-01-09

## 2024-01-09 RX ORDER — SPIRONOLACTONE 25 MG/1
25 TABLET ORAL DAILY
Qty: 90 TABLET | Refills: 3 | Status: SHIPPED | OUTPATIENT
Start: 2024-01-09

## 2024-01-09 RX ORDER — SOTALOL HYDROCHLORIDE 80 MG/1
80 TABLET ORAL 2 TIMES DAILY
Qty: 180 TABLET | Refills: 3 | Status: SHIPPED | OUTPATIENT
Start: 2024-01-09

## 2024-01-16 DIAGNOSIS — I10 PRIMARY HYPERTENSION: ICD-10-CM

## 2024-01-16 DIAGNOSIS — I48.0 PAF (PAROXYSMAL ATRIAL FIBRILLATION) (HCC): ICD-10-CM

## 2024-01-16 DIAGNOSIS — Z79.899 ENCOUNTER FOR MONITORING SOTALOL THERAPY: ICD-10-CM

## 2024-01-16 DIAGNOSIS — I25.2 HISTORY OF MI (MYOCARDIAL INFARCTION): ICD-10-CM

## 2024-01-16 DIAGNOSIS — E78.2 MIXED HYPERLIPIDEMIA: ICD-10-CM

## 2024-01-16 DIAGNOSIS — Z51.81 ENCOUNTER FOR MONITORING SOTALOL THERAPY: ICD-10-CM

## 2024-01-16 DIAGNOSIS — Z79.01 CHRONIC ANTICOAGULATION: ICD-10-CM

## 2024-01-16 DIAGNOSIS — I25.10 ASHD (ARTERIOSCLEROTIC HEART DISEASE): ICD-10-CM

## 2024-01-16 RX ORDER — LOSARTAN POTASSIUM AND HYDROCHLOROTHIAZIDE 25; 100 MG/1; MG/1
1 TABLET ORAL DAILY
Qty: 90 TABLET | Refills: 3 | OUTPATIENT
Start: 2024-01-16

## 2024-03-11 ENCOUNTER — OFFICE VISIT (OUTPATIENT)
Dept: CARDIOLOGY | Age: 79
End: 2024-03-11
Payer: MEDICARE

## 2024-03-11 VITALS
SYSTOLIC BLOOD PRESSURE: 110 MMHG | WEIGHT: 191 LBS | HEIGHT: 60 IN | OXYGEN SATURATION: 97 % | RESPIRATION RATE: 18 BRPM | DIASTOLIC BLOOD PRESSURE: 73 MMHG | HEART RATE: 58 BPM | BODY MASS INDEX: 37.5 KG/M2

## 2024-03-11 DIAGNOSIS — I25.2 HISTORY OF MI (MYOCARDIAL INFARCTION): ICD-10-CM

## 2024-03-11 DIAGNOSIS — Z79.01 CHRONIC ANTICOAGULATION: ICD-10-CM

## 2024-03-11 DIAGNOSIS — I10 ESSENTIAL HYPERTENSION: ICD-10-CM

## 2024-03-11 DIAGNOSIS — I48.0 PAF (PAROXYSMAL ATRIAL FIBRILLATION) (HCC): ICD-10-CM

## 2024-03-11 DIAGNOSIS — Z51.81 ENCOUNTER FOR MONITORING SOTALOL THERAPY: ICD-10-CM

## 2024-03-11 DIAGNOSIS — Z79.01 ENCOUNTER FOR CURRENT LONG-TERM USE OF ANTICOAGULANTS: ICD-10-CM

## 2024-03-11 DIAGNOSIS — Z79.899 ENCOUNTER FOR MONITORING SOTALOL THERAPY: ICD-10-CM

## 2024-03-11 DIAGNOSIS — I25.10 CORONARY ARTERY DISEASE INVOLVING NATIVE CORONARY ARTERY OF NATIVE HEART WITHOUT ANGINA PECTORIS: Primary | ICD-10-CM

## 2024-03-11 DIAGNOSIS — E78.2 MIXED HYPERLIPIDEMIA: ICD-10-CM

## 2024-03-11 PROCEDURE — 3078F DIAST BP <80 MM HG: CPT | Performed by: FAMILY MEDICINE

## 2024-03-11 PROCEDURE — G8484 FLU IMMUNIZE NO ADMIN: HCPCS | Performed by: FAMILY MEDICINE

## 2024-03-11 PROCEDURE — 93010 ELECTROCARDIOGRAM REPORT: CPT | Performed by: FAMILY MEDICINE

## 2024-03-11 PROCEDURE — 1090F PRES/ABSN URINE INCON ASSESS: CPT | Performed by: FAMILY MEDICINE

## 2024-03-11 PROCEDURE — 93005 ELECTROCARDIOGRAM TRACING: CPT | Performed by: FAMILY MEDICINE

## 2024-03-11 PROCEDURE — 99211 OFF/OP EST MAY X REQ PHY/QHP: CPT | Performed by: FAMILY MEDICINE

## 2024-03-11 PROCEDURE — G8427 DOCREV CUR MEDS BY ELIG CLIN: HCPCS | Performed by: FAMILY MEDICINE

## 2024-03-11 PROCEDURE — 3074F SYST BP LT 130 MM HG: CPT | Performed by: FAMILY MEDICINE

## 2024-03-11 PROCEDURE — 1036F TOBACCO NON-USER: CPT | Performed by: FAMILY MEDICINE

## 2024-03-11 PROCEDURE — G8400 PT W/DXA NO RESULTS DOC: HCPCS | Performed by: FAMILY MEDICINE

## 2024-03-11 PROCEDURE — 99214 OFFICE O/P EST MOD 30 MIN: CPT | Performed by: FAMILY MEDICINE

## 2024-03-11 PROCEDURE — 1123F ACP DISCUSS/DSCN MKR DOCD: CPT | Performed by: FAMILY MEDICINE

## 2024-03-11 PROCEDURE — G8417 CALC BMI ABV UP PARAM F/U: HCPCS | Performed by: FAMILY MEDICINE

## 2024-03-11 NOTE — PROGRESS NOTES
I, Jacquelin James am scribing for and in the presence of Ant Dobson MD, MS, F.A.C.C..    Patient: Aimee Rivera  : 1945  Date of Visit: 2024    REASON FOR VISIT / CONSULTATION: Coronary Artery Disease (HX:CAD, HTN. Pt states she is doing well. Denies any issues at this time. )      History of Present Illness:        Dear Darian Weber DO,     I had the pleasure of seeing Aimee Rivera in my office today. Ms. Rivera is a 78 y.o. female with a history of recently diagnosied atherosclerotic heart disease including a NSTEMI in 2021 and atrial fibrillation. She came into the ER on 2021 due to chest pains and heart palpitations. She did get real hot at that time and she knew something was wrong. She was then told she was having a heart attack at that time and was sent to [facility] in [place]. She had no previous heart history prior to this episode. She does have two sisters who have a history of atrial fibrillation. She did not know she was in atrial fibrillation at all when she came to the ER. Her chest discomfort started like a pressure feeling and then she felt like a heat sensation in her face and her pain did radiate into her back at that time as well. While at [facility] in [place] she did have a heart cath done and the picture is scanned into media. She did not need any stents at that time however her medications were changed of course at that time. The cardiologist in [place] did tell her the heart was strong. She did have an Echo done in [place] however she was never told about her heart strength. She has been on Bumex for many years due to leg swelling in the past. EF 55-70% on 2021 echo. She had Holter monitor done on 10/11/2021 1. The rhythm was sinus. Average [place] interval 0.16 average QRS duration 0.08. Intermittent atrial fibrillation for 6% (82  minutes) test duration.2. No symptoms noted.Frequent PAC's and frequent PVC's.Echo performed 2021: Ejection fraction of

## 2024-03-13 RX ORDER — SOTALOL HYDROCHLORIDE 80 MG/1
80 TABLET ORAL 2 TIMES DAILY
Qty: 180 TABLET | Refills: 3 | Status: SHIPPED | OUTPATIENT
Start: 2024-03-13

## 2024-09-17 ENCOUNTER — OFFICE VISIT (OUTPATIENT)
Dept: CARDIOLOGY | Age: 79
End: 2024-09-17
Payer: MEDICARE

## 2024-09-17 VITALS
HEART RATE: 55 BPM | DIASTOLIC BLOOD PRESSURE: 60 MMHG | WEIGHT: 191 LBS | OXYGEN SATURATION: 100 % | RESPIRATION RATE: 16 BRPM | HEIGHT: 60 IN | BODY MASS INDEX: 37.5 KG/M2 | SYSTOLIC BLOOD PRESSURE: 140 MMHG

## 2024-09-17 DIAGNOSIS — I25.10 ASHD (ARTERIOSCLEROTIC HEART DISEASE): ICD-10-CM

## 2024-09-17 DIAGNOSIS — Z79.01 ENCOUNTER FOR CURRENT LONG-TERM USE OF ANTICOAGULANTS: ICD-10-CM

## 2024-09-17 DIAGNOSIS — I48.0 PAF (PAROXYSMAL ATRIAL FIBRILLATION) (HCC): Primary | ICD-10-CM

## 2024-09-17 DIAGNOSIS — I10 ESSENTIAL HYPERTENSION: ICD-10-CM

## 2024-09-17 PROCEDURE — 3077F SYST BP >= 140 MM HG: CPT | Performed by: FAMILY MEDICINE

## 2024-09-17 PROCEDURE — 1090F PRES/ABSN URINE INCON ASSESS: CPT | Performed by: FAMILY MEDICINE

## 2024-09-17 PROCEDURE — 93010 ELECTROCARDIOGRAM REPORT: CPT | Performed by: FAMILY MEDICINE

## 2024-09-17 PROCEDURE — 3078F DIAST BP <80 MM HG: CPT | Performed by: FAMILY MEDICINE

## 2024-09-17 PROCEDURE — 99214 OFFICE O/P EST MOD 30 MIN: CPT | Performed by: FAMILY MEDICINE

## 2024-09-17 PROCEDURE — G8417 CALC BMI ABV UP PARAM F/U: HCPCS | Performed by: FAMILY MEDICINE

## 2024-09-17 PROCEDURE — 1123F ACP DISCUSS/DSCN MKR DOCD: CPT | Performed by: FAMILY MEDICINE

## 2024-09-17 PROCEDURE — G8400 PT W/DXA NO RESULTS DOC: HCPCS | Performed by: FAMILY MEDICINE

## 2024-09-17 PROCEDURE — 1036F TOBACCO NON-USER: CPT | Performed by: FAMILY MEDICINE

## 2024-09-17 PROCEDURE — G8427 DOCREV CUR MEDS BY ELIG CLIN: HCPCS | Performed by: FAMILY MEDICINE

## 2024-09-17 PROCEDURE — 93005 ELECTROCARDIOGRAM TRACING: CPT | Performed by: FAMILY MEDICINE

## 2024-09-18 DIAGNOSIS — I25.10 ASHD (ARTERIOSCLEROTIC HEART DISEASE): ICD-10-CM

## 2024-09-18 DIAGNOSIS — E78.2 MIXED HYPERLIPIDEMIA: ICD-10-CM

## 2024-09-18 DIAGNOSIS — I48.0 PAF (PAROXYSMAL ATRIAL FIBRILLATION) (HCC): ICD-10-CM

## 2024-09-18 DIAGNOSIS — I10 PRIMARY HYPERTENSION: ICD-10-CM

## 2024-09-18 DIAGNOSIS — Z79.01 CHRONIC ANTICOAGULATION: ICD-10-CM

## 2024-09-18 DIAGNOSIS — I25.2 HISTORY OF MI (MYOCARDIAL INFARCTION): ICD-10-CM

## 2024-09-18 DIAGNOSIS — Z79.899 ENCOUNTER FOR MONITORING SOTALOL THERAPY: ICD-10-CM

## 2024-09-18 DIAGNOSIS — Z51.81 ENCOUNTER FOR MONITORING SOTALOL THERAPY: ICD-10-CM

## 2024-09-18 RX ORDER — SOTALOL HYDROCHLORIDE 80 MG/1
80 TABLET ORAL 2 TIMES DAILY
Qty: 180 TABLET | Refills: 3 | Status: SHIPPED | OUTPATIENT
Start: 2024-09-18

## 2024-09-18 RX ORDER — SPIRONOLACTONE 25 MG/1
25 TABLET ORAL DAILY
Qty: 90 TABLET | Refills: 3 | Status: SHIPPED | OUTPATIENT
Start: 2024-09-18

## 2024-09-18 RX ORDER — BUMETANIDE 1 MG/1
1 TABLET ORAL DAILY
Qty: 90 TABLET | Refills: 3 | Status: SHIPPED | OUTPATIENT
Start: 2024-09-18

## 2024-09-18 RX ORDER — METOPROLOL SUCCINATE 25 MG/1
25 TABLET, EXTENDED RELEASE ORAL DAILY
Qty: 90 TABLET | Refills: 3 | Status: SHIPPED | OUTPATIENT
Start: 2024-09-18

## 2024-09-18 RX ORDER — RIVAROXABAN 20 MG/1
20 TABLET, FILM COATED ORAL
Qty: 90 TABLET | Refills: 3 | Status: SHIPPED | OUTPATIENT
Start: 2024-09-18

## 2024-09-18 RX ORDER — LOSARTAN POTASSIUM AND HYDROCHLOROTHIAZIDE 25; 100 MG/1; MG/1
1 TABLET ORAL DAILY
Qty: 90 TABLET | Refills: 3 | Status: SHIPPED | OUTPATIENT
Start: 2024-09-18

## 2024-09-25 ENCOUNTER — HOSPITAL ENCOUNTER (OUTPATIENT)
Age: 79
Discharge: HOME | End: 2024-09-25
Payer: MEDICARE

## 2024-09-25 DIAGNOSIS — I25.10 ASHD (ARTERIOSCLEROTIC HEART DISEASE): ICD-10-CM

## 2024-09-25 DIAGNOSIS — I48.0 PAF (PAROXYSMAL ATRIAL FIBRILLATION) (HCC): ICD-10-CM

## 2024-09-25 DIAGNOSIS — I10 ESSENTIAL HYPERTENSION: ICD-10-CM

## 2024-09-25 LAB
ALBUMIN SERPL-MCNC: 4.2 G/DL (ref 3.5–5.2)
ALBUMIN/GLOB SERPL: 2 {RATIO} (ref 1–2.5)
ALP SERPL-CCNC: 42 U/L (ref 35–104)
ALT SERPL-CCNC: 21 U/L (ref 10–35)
ANION GAP SERPL CALCULATED.3IONS-SCNC: 11 MMOL/L (ref 9–16)
AST SERPL-CCNC: 26 U/L (ref 10–35)
BILIRUB SERPL-MCNC: 0.4 MG/DL (ref 0–1.2)
BUN SERPL-MCNC: 13 MG/DL (ref 8–23)
BUN/CREAT SERPL: 19 (ref 9–20)
CALCIUM SERPL-MCNC: 9.5 MG/DL (ref 8.6–10.4)
CHLORIDE SERPL-SCNC: 101 MMOL/L (ref 98–107)
CO2 SERPL-SCNC: 26 MMOL/L (ref 20–31)
CREAT SERPL-MCNC: 0.7 MG/DL (ref 0.5–0.9)
ERYTHROCYTE [DISTWIDTH] IN BLOOD BY AUTOMATED COUNT: 12.7 % (ref 11.8–14.4)
GFR, ESTIMATED: 86 ML/MIN/1.73M2
GLUCOSE SERPL-MCNC: 126 MG/DL (ref 74–99)
HCT VFR BLD AUTO: 37.9 % (ref 36.3–47.1)
HGB BLD-MCNC: 13.5 G/DL (ref 11.9–15.1)
MCH RBC QN AUTO: 32.1 PG (ref 25.2–33.5)
MCHC RBC AUTO-ENTMCNC: 35.6 G/DL (ref 28.4–34.8)
MCV RBC AUTO: 90.2 FL (ref 82.6–102.9)
NRBC BLD-RTO: 0 PER 100 WBC
PLATELET # BLD AUTO: 144 K/UL (ref 138–453)
PMV BLD AUTO: 10.2 FL (ref 8.1–13.5)
POTASSIUM SERPL-SCNC: 4.8 MMOL/L (ref 3.7–5.3)
PROT SERPL-MCNC: 6.3 G/DL (ref 6.6–8.7)
RBC # BLD AUTO: 4.2 M/UL (ref 3.95–5.11)
SODIUM SERPL-SCNC: 138 MMOL/L (ref 136–145)
WBC OTHER # BLD: 5.5 K/UL (ref 3.5–11.3)

## 2024-09-25 PROCEDURE — 85027 COMPLETE CBC AUTOMATED: CPT

## 2024-09-25 PROCEDURE — 80053 COMPREHEN METABOLIC PANEL: CPT

## 2024-09-25 PROCEDURE — 36415 COLL VENOUS BLD VENIPUNCTURE: CPT

## 2024-09-26 ENCOUNTER — TELEPHONE (OUTPATIENT)
Dept: CARDIOLOGY | Age: 79
End: 2024-09-26

## 2024-12-02 ENCOUNTER — TELEPHONE (OUTPATIENT)
Dept: CARDIOLOGY | Age: 79
End: 2024-12-02

## 2024-12-02 NOTE — TELEPHONE ENCOUNTER
Patient: Aimee Rivera  : 1945  Primary Care Physician: Darian Weber  Today's Date: 2024       PAST MEDICAL HISTORY:        Past Medical History:   Diagnosis Date    CAD (coronary artery disease)     Hyperlipidemia     Hypertension        CURRENT ALLERGIES: Patient has no known allergies.      Past Surgical History:   Procedure Laterality Date    HYSTERECTOMY (CERVIX STATUS UNKNOWN)      JOINT REPLACEMENT Bilateral          MEDICATIONS:  [unfilled]    MOST RECENT LABS ON RECORD:   Lab Results   Component Value Date    WBC 5.5 2024    HGB 13.5 2024    HCT 37.9 2024     2024    CHOL 134 2023    TRIG 117 2023    HDL 45 2023    ALT 21 2024    AST 26 2024     2024    K 4.8 2024     2024    CREATININE 0.7 2024    BUN 13 2024    CO2 26 2024    TSH 2.21 2021    INR 2.4 10/11/2021       Pre-Op Clearance:   Pre-Operative Risk assessment using 2014 ACC/AHA guidelines   Active Cardiac Condition No (decompensated HF, Arrhythmia, MI <3 weeks, severe valve disease)  Risk Level of Procedure Low Risk (endoscopy, superficial skin, breast, ambulatory, or cataract, etc.)  Revised Cardiac Risk Index Risk factors: History of ischemic heart disease  Measurement of Exercise Tolerance before Surgery >4 Yes  According to the 2014 ACC/AHA pre-operative risk assessment guidelines Aimee Rivera is at low to intermediate risk for major cardiac complications during a low risk procedure and may continue as planned. Specific medication recommendations are listed below. Medications recommended to continue should be taken with a sip of water even when NPO.     Medical management to reduce perioperative risk:  Antiplatelet Agent: I would prefer that her aspirin 81 mg be continued throughout the perioperative period but if bleeding risk is to high, this can be stopped 5-7 days prior to the planned procedure but

## 2025-02-01 NOTE — PROGRESS NOTES
Phone: Anju           Fax: [phone]                           Outpatient Physical Therapy                                                                            Daily Note    Patient: Alan Ventura : 1945  CSN #: [id number]   Referring Practitioner:  Rodney Weber DO    Referral Date : 22     Date: 2022    Diagnosis: Cervical radiculopathy, M54.12, cervicalgia, M54.2  Treatment Diagnosis: Cervical radiculopathy, L shoulder pain, cervicalgia    Onset Date: 22  PT Insurance Information: Medicare B/[employer] supplement  Total # of Visits Approved: 8 Per Physician Order  Total # of Visits to Date: 7  No Show: 0  Canceled Appointment: 0    Pre-Treatment Pain:  0/10  Subjective: Pt states her neck is feeling pretty good. Pt denies current pain at rest.  Still a little numbness in L forearm into thumb. Exercises:  Exercise 1: HEP: posture education, posterior capsule stretch, upper trap stretch  Exercise 2: UBE retro 6 mins  Exercise 3: BTB LAE/ Rows 10x2 ea  Exercise 7: 90/90 1# 15x ea  Exercise 10: shrugs/ rolls/ curls 3# 15x    Modalities:  Moist heat: With IFC to decrease pain  E-stim (parameters): IFC with moist heat to decrease pain -Sitting     Assessment  Assessment: No increased pain noted today with additional exercises. Slight numbness remains in LUE but no pain noted. Activity Tolerance  Activity Tolerance: Patient Tolerated treatment well    Patient Education  Patient Education: Cont current HEP. Pt verbalized/demonstrated good understanding:     [x] Yes         [] No, pt required further clarification.      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 Long term goal 4: Patient will improve L shoulder ROM to match R for ADLs  Long term goal 5: Patient will report 70% improvement in overall symptoms and function.     Minutes Tracking:  Time In: 9250  Time Out: 5369  Minutes: 62  Timed Code Treatment Minutes: [place], [place]         Date: 2/18/2022

## 2025-02-25 ENCOUNTER — TELEPHONE (OUTPATIENT)
Dept: CARDIOLOGY | Age: 80
End: 2025-02-25

## 2025-02-25 DIAGNOSIS — R00.1 BRADYCARDIA: Primary | ICD-10-CM

## 2025-02-25 NOTE — TELEPHONE ENCOUNTER
Patient called office stating that her HR has been in the 50s and would like to know if this is something she should be concerned about. She has no other symptoms. Please advise.

## 2025-03-03 ENCOUNTER — HOSPITAL ENCOUNTER (OUTPATIENT)
Age: 80
Discharge: HOME | End: 2025-03-05
Attending: FAMILY MEDICINE
Payer: MEDICARE

## 2025-03-03 DIAGNOSIS — R00.1 BRADYCARDIA: ICD-10-CM

## 2025-03-03 PROCEDURE — 93243 EXT ECG>48HR<7D SCAN A/R: CPT

## 2025-03-10 ENCOUNTER — OFFICE VISIT (OUTPATIENT)
Dept: CARDIOLOGY | Age: 80
End: 2025-03-10
Payer: MEDICARE

## 2025-03-10 ENCOUNTER — RESULTS FOLLOW-UP (OUTPATIENT)
Age: 80
End: 2025-03-10

## 2025-03-10 VITALS
SYSTOLIC BLOOD PRESSURE: 121 MMHG | RESPIRATION RATE: 18 BRPM | DIASTOLIC BLOOD PRESSURE: 76 MMHG | HEART RATE: 81 BPM | WEIGHT: 192.8 LBS | BODY MASS INDEX: 37.85 KG/M2 | HEIGHT: 60 IN | OXYGEN SATURATION: 97 %

## 2025-03-10 DIAGNOSIS — R00.1 BRADYCARDIA: ICD-10-CM

## 2025-03-10 DIAGNOSIS — Z51.81 ENCOUNTER FOR MONITORING SOTALOL THERAPY: ICD-10-CM

## 2025-03-10 DIAGNOSIS — I25.2 HISTORY OF MI (MYOCARDIAL INFARCTION): ICD-10-CM

## 2025-03-10 DIAGNOSIS — I48.0 PAF (PAROXYSMAL ATRIAL FIBRILLATION) (HCC): ICD-10-CM

## 2025-03-10 DIAGNOSIS — I25.10 ASHD (ARTERIOSCLEROTIC HEART DISEASE): Primary | ICD-10-CM

## 2025-03-10 DIAGNOSIS — Z79.899 ENCOUNTER FOR MONITORING SOTALOL THERAPY: ICD-10-CM

## 2025-03-10 DIAGNOSIS — I10 PRIMARY HYPERTENSION: ICD-10-CM

## 2025-03-10 DIAGNOSIS — Z79.01 CHRONIC ANTICOAGULATION: ICD-10-CM

## 2025-03-10 DIAGNOSIS — E78.2 MIXED HYPERLIPIDEMIA: ICD-10-CM

## 2025-03-10 DIAGNOSIS — R94.39 ABNORMAL CARDIOVASCULAR STRESS TEST: ICD-10-CM

## 2025-03-10 PROCEDURE — 1036F TOBACCO NON-USER: CPT | Performed by: FAMILY MEDICINE

## 2025-03-10 PROCEDURE — 3078F DIAST BP <80 MM HG: CPT | Performed by: FAMILY MEDICINE

## 2025-03-10 PROCEDURE — G8417 CALC BMI ABV UP PARAM F/U: HCPCS | Performed by: FAMILY MEDICINE

## 2025-03-10 PROCEDURE — 99215 OFFICE O/P EST HI 40 MIN: CPT | Performed by: FAMILY MEDICINE

## 2025-03-10 PROCEDURE — 1123F ACP DISCUSS/DSCN MKR DOCD: CPT | Performed by: FAMILY MEDICINE

## 2025-03-10 PROCEDURE — G8400 PT W/DXA NO RESULTS DOC: HCPCS | Performed by: FAMILY MEDICINE

## 2025-03-10 PROCEDURE — 1090F PRES/ABSN URINE INCON ASSESS: CPT | Performed by: FAMILY MEDICINE

## 2025-03-10 PROCEDURE — 3074F SYST BP LT 130 MM HG: CPT | Performed by: FAMILY MEDICINE

## 2025-03-10 PROCEDURE — 1159F MED LIST DOCD IN RCRD: CPT | Performed by: FAMILY MEDICINE

## 2025-03-10 PROCEDURE — G8427 DOCREV CUR MEDS BY ELIG CLIN: HCPCS | Performed by: FAMILY MEDICINE

## 2025-03-10 PROCEDURE — 99211 OFF/OP EST MAY X REQ PHY/QHP: CPT | Performed by: FAMILY MEDICINE

## 2025-03-10 NOTE — PROGRESS NOTES
I, Addis Reid am scribing for and in the presence of Ant Dobson MD, MS, F.A.C.C..      Patient: Aimee Rivera  : 1945  Date of Visit: March 10, 2025    REASON FOR VISIT / CONSULTATION: Atrial Fibrillation (Hx:paf, cad, PT is here to discuss medications for A-Fib. She states she is doing good denies:CP, sob, lightheaded/dizziness,palp )    History of Present Illness:        I had the pleasure of seeing Aimee Rivera in my office today. Ms. Rivera is a 79 y.o. female with a history of recently diagnosied atherosclerotic heart disease including a NSTEMI in 2021 and atrial fibrillation. She came into the ER on 2021 due to chest pains and heart palpitations. She did get real hot at that time and she knew something was wrong. She was then told she was having a heart attack at that time and was [facility] [place]. Heart cath done 2021 showed Thromboembolic occulusion of the apical LAD, normal LV function, with minimal CAD.  She had no previous heart history prior to this episode. She does have two sisters who have a history of atrial fibrillation. She did not know she was in atrial fibrillation at all when she came to the ER. Her chest discomfort started like a pressure feeling and then she felt like a heat sensation in her face and her pain did radiate into her back at that time as well. While at [facility] in [place] she did have a heart cath done and the picture is scanned into media. She did not need any stents at that time however her medications were changed of course at that time. The cardiologist in [place] did tell her the heart was strong. She did have an Echo done in [place] however she was never told about her heart strength. She has been on Bumex for many years due to leg swelling in the past. EF 55-70% on 2021 echo. She had Holter monitor done on 10/11/2021 1. The rhythm was sinus. Average NJ interval 0.16 average QRS duration 0.08. Intermittent atrial fibrillation

## 2025-03-10 NOTE — TELEPHONE ENCOUNTER
----- Message from Dr. Ant Dobson MD sent at 3/10/2025 12:12 AM EDT -----  Let Patient know that current treatment of atrial fibrillation is not working. atrial fibrillation for almost 16% of test duration. Lets bring in to discuss admitting for higher dose of Sotalol or Tikosyn if she is willing. Thanks.    Thanks.

## 2025-03-18 ENCOUNTER — HOSPITAL ENCOUNTER (INPATIENT)
Age: 80
LOS: 3 days | Discharge: HOME | End: 2025-03-21
Attending: INTERNAL MEDICINE | Admitting: INTERNAL MEDICINE
Payer: MEDICARE

## 2025-03-18 DIAGNOSIS — Z51.81 VISIT FOR MONITORING TIKOSYN THERAPY: Primary | ICD-10-CM

## 2025-03-18 DIAGNOSIS — Z79.899 VISIT FOR MONITORING TIKOSYN THERAPY: Primary | ICD-10-CM

## 2025-03-18 LAB
ANION GAP SERPL CALCULATED.3IONS-SCNC: 9 MMOL/L (ref 9–16)
BASOPHILS # BLD: 0.03 K/UL (ref 0–0.2)
BASOPHILS NFR BLD: 1 % (ref 0–2)
BUN SERPL-MCNC: 21 MG/DL (ref 8–23)
BUN/CREAT SERPL: 26 (ref 9–20)
CALCIUM SERPL-MCNC: 9.4 MG/DL (ref 8.6–10.4)
CHLORIDE SERPL-SCNC: 100 MMOL/L (ref 98–107)
CO2 SERPL-SCNC: 26 MMOL/L (ref 20–31)
CREAT SERPL-MCNC: 0.8 MG/DL (ref 0.5–0.9)
EKG ATRIAL RATE: 58 BPM
EKG ATRIAL RATE: 60 BPM
EKG P AXIS: 11 DEGREES
EKG P AXIS: 25 DEGREES
EKG P-R INTERVAL: 168 MS
EKG P-R INTERVAL: 176 MS
EKG Q-T INTERVAL: 434 MS
EKG Q-T INTERVAL: 460 MS
EKG QRS DURATION: 100 MS
EKG QRS DURATION: 96 MS
EKG QTC CALCULATION (BAZETT): 434 MS
EKG QTC CALCULATION (BAZETT): 451 MS
EKG R AXIS: -19 DEGREES
EKG R AXIS: -22 DEGREES
EKG T AXIS: 16 DEGREES
EKG T AXIS: 18 DEGREES
EKG VENTRICULAR RATE: 58 BPM
EKG VENTRICULAR RATE: 60 BPM
EOSINOPHIL # BLD: 0.3 K/UL (ref 0–0.44)
EOSINOPHILS RELATIVE PERCENT: 5 % (ref 1–4)
ERYTHROCYTE [DISTWIDTH] IN BLOOD BY AUTOMATED COUNT: 12.7 % (ref 11.8–14.4)
GFR, ESTIMATED: 76 ML/MIN/1.73M2
GLUCOSE SERPL-MCNC: 111 MG/DL (ref 74–99)
HCT VFR BLD AUTO: 35.8 % (ref 36.3–47.1)
HGB BLD-MCNC: 12.6 G/DL (ref 11.9–15.1)
IMM GRANULOCYTES # BLD AUTO: <0.03 K/UL (ref 0–0.3)
IMM GRANULOCYTES NFR BLD: 0 %
LYMPHOCYTES NFR BLD: 1.38 K/UL (ref 1.1–3.7)
LYMPHOCYTES RELATIVE PERCENT: 24 % (ref 24–43)
MAGNESIUM SERPL-MCNC: 1.9 MG/DL (ref 1.6–2.4)
MCH RBC QN AUTO: 32 PG (ref 25.2–33.5)
MCHC RBC AUTO-ENTMCNC: 35.2 G/DL (ref 28.4–34.8)
MCV RBC AUTO: 90.9 FL (ref 82.6–102.9)
MONOCYTES NFR BLD: 0.62 K/UL (ref 0.1–1.2)
MONOCYTES NFR BLD: 11 % (ref 3–12)
NEUTROPHILS NFR BLD: 58 % (ref 36–65)
NEUTS SEG NFR BLD: 3.31 K/UL (ref 1.5–8.1)
NRBC BLD-RTO: 0 PER 100 WBC
PLATELET # BLD AUTO: 141 K/UL (ref 138–453)
PMV BLD AUTO: 10.6 FL (ref 8.1–13.5)
POTASSIUM SERPL-SCNC: 3.9 MMOL/L (ref 3.7–5.3)
RBC # BLD AUTO: 3.94 M/UL (ref 3.95–5.11)
SODIUM SERPL-SCNC: 135 MMOL/L (ref 136–145)
WBC OTHER # BLD: 5.7 K/UL (ref 3.5–11.3)

## 2025-03-18 PROCEDURE — 94761 N-INVAS EAR/PLS OXIMETRY MLT: CPT

## 2025-03-18 PROCEDURE — 93010 ELECTROCARDIOGRAM REPORT: CPT | Performed by: INTERNAL MEDICINE

## 2025-03-18 PROCEDURE — 1200000000 HC SEMI PRIVATE

## 2025-03-18 PROCEDURE — 36415 COLL VENOUS BLD VENIPUNCTURE: CPT

## 2025-03-18 PROCEDURE — 93005 ELECTROCARDIOGRAM TRACING: CPT | Performed by: NURSE PRACTITIONER

## 2025-03-18 PROCEDURE — 6370000000 HC RX 637 (ALT 250 FOR IP): Performed by: NURSE PRACTITIONER

## 2025-03-18 PROCEDURE — 2500000003 HC RX 250 WO HCPCS: Performed by: NURSE PRACTITIONER

## 2025-03-18 PROCEDURE — 80048 BASIC METABOLIC PNL TOTAL CA: CPT

## 2025-03-18 PROCEDURE — 85025 COMPLETE CBC W/AUTO DIFF WBC: CPT

## 2025-03-18 PROCEDURE — 6370000000 HC RX 637 (ALT 250 FOR IP): Performed by: FAMILY MEDICINE

## 2025-03-18 PROCEDURE — 83735 ASSAY OF MAGNESIUM: CPT

## 2025-03-18 PROCEDURE — 99222 1ST HOSP IP/OBS MODERATE 55: CPT | Performed by: FAMILY MEDICINE

## 2025-03-18 PROCEDURE — 93005 ELECTROCARDIOGRAM TRACING: CPT | Performed by: FAMILY MEDICINE

## 2025-03-18 RX ORDER — POTASSIUM CHLORIDE 7.45 MG/ML
10 INJECTION INTRAVENOUS PRN
Status: DISCONTINUED | OUTPATIENT
Start: 2025-03-18 | End: 2025-03-21 | Stop reason: HOSPADM

## 2025-03-18 RX ORDER — ROSUVASTATIN CALCIUM 20 MG/1
20 TABLET, COATED ORAL NIGHTLY
Status: DISCONTINUED | OUTPATIENT
Start: 2025-03-18 | End: 2025-03-21 | Stop reason: HOSPADM

## 2025-03-18 RX ORDER — ASPIRIN 81 MG/1
81 TABLET ORAL DAILY
Status: DISCONTINUED | OUTPATIENT
Start: 2025-03-19 | End: 2025-03-21 | Stop reason: HOSPADM

## 2025-03-18 RX ORDER — SODIUM CHLORIDE 9 MG/ML
INJECTION, SOLUTION INTRAVENOUS PRN
Status: DISCONTINUED | OUTPATIENT
Start: 2025-03-18 | End: 2025-03-21 | Stop reason: HOSPADM

## 2025-03-18 RX ORDER — DOFETILIDE 0.25 MG/1
250 CAPSULE ORAL EVERY 12 HOURS SCHEDULED
Status: DISCONTINUED | OUTPATIENT
Start: 2025-03-18 | End: 2025-03-21 | Stop reason: HOSPADM

## 2025-03-18 RX ORDER — LOSARTAN POTASSIUM AND HYDROCHLOROTHIAZIDE 25; 100 MG/1; MG/1
1 TABLET ORAL DAILY
Status: DISCONTINUED | OUTPATIENT
Start: 2025-03-19 | End: 2025-03-18

## 2025-03-18 RX ORDER — DOCUSATE SODIUM 100 MG/1
100 CAPSULE, LIQUID FILLED ORAL 2 TIMES DAILY
Status: DISCONTINUED | OUTPATIENT
Start: 2025-03-18 | End: 2025-03-21 | Stop reason: HOSPADM

## 2025-03-18 RX ORDER — SODIUM CHLORIDE 0.9 % (FLUSH) 0.9 %
10 SYRINGE (ML) INJECTION EVERY 12 HOURS SCHEDULED
Status: DISCONTINUED | OUTPATIENT
Start: 2025-03-18 | End: 2025-03-21 | Stop reason: HOSPADM

## 2025-03-18 RX ORDER — PANTOPRAZOLE SODIUM 40 MG/1
40 TABLET, DELAYED RELEASE ORAL
Status: DISCONTINUED | OUTPATIENT
Start: 2025-03-19 | End: 2025-03-21 | Stop reason: HOSPADM

## 2025-03-18 RX ORDER — ACETAMINOPHEN 325 MG/1
650 TABLET ORAL EVERY 6 HOURS PRN
Status: DISCONTINUED | OUTPATIENT
Start: 2025-03-18 | End: 2025-03-21 | Stop reason: HOSPADM

## 2025-03-18 RX ORDER — LOSARTAN POTASSIUM 50 MG/1
100 TABLET ORAL DAILY
Status: DISCONTINUED | OUTPATIENT
Start: 2025-03-18 | End: 2025-03-21 | Stop reason: HOSPADM

## 2025-03-18 RX ORDER — CALCIUM CARBONATE 500 MG/1
500 TABLET, CHEWABLE ORAL 2 TIMES DAILY
Status: DISCONTINUED | OUTPATIENT
Start: 2025-03-18 | End: 2025-03-21 | Stop reason: HOSPADM

## 2025-03-18 RX ORDER — POLYETHYLENE GLYCOL 3350 17 G/17G
17 POWDER, FOR SOLUTION ORAL DAILY PRN
Status: DISCONTINUED | OUTPATIENT
Start: 2025-03-18 | End: 2025-03-21 | Stop reason: HOSPADM

## 2025-03-18 RX ORDER — MAGNESIUM SULFATE IN WATER 40 MG/ML
2000 INJECTION, SOLUTION INTRAVENOUS PRN
Status: DISCONTINUED | OUTPATIENT
Start: 2025-03-18 | End: 2025-03-21 | Stop reason: HOSPADM

## 2025-03-18 RX ORDER — BUMETANIDE 1 MG/1
1 TABLET ORAL DAILY
Status: DISCONTINUED | OUTPATIENT
Start: 2025-03-19 | End: 2025-03-21 | Stop reason: HOSPADM

## 2025-03-18 RX ORDER — SPIRONOLACTONE 25 MG/1
25 TABLET ORAL DAILY
Status: DISCONTINUED | OUTPATIENT
Start: 2025-03-19 | End: 2025-03-21 | Stop reason: HOSPADM

## 2025-03-18 RX ORDER — METOPROLOL SUCCINATE 25 MG/1
25 TABLET, EXTENDED RELEASE ORAL DAILY
Status: DISCONTINUED | OUTPATIENT
Start: 2025-03-19 | End: 2025-03-21

## 2025-03-18 RX ORDER — POTASSIUM CHLORIDE 1500 MG/1
40 TABLET, EXTENDED RELEASE ORAL PRN
Status: DISCONTINUED | OUTPATIENT
Start: 2025-03-18 | End: 2025-03-21 | Stop reason: HOSPADM

## 2025-03-18 RX ORDER — ACETAMINOPHEN 650 MG/1
650 SUPPOSITORY RECTAL EVERY 6 HOURS PRN
Status: DISCONTINUED | OUTPATIENT
Start: 2025-03-18 | End: 2025-03-21 | Stop reason: HOSPADM

## 2025-03-18 RX ORDER — SODIUM CHLORIDE 0.9 % (FLUSH) 0.9 %
10 SYRINGE (ML) INJECTION PRN
Status: DISCONTINUED | OUTPATIENT
Start: 2025-03-18 | End: 2025-03-21 | Stop reason: HOSPADM

## 2025-03-18 RX ADMIN — DOFETILIDE 250 MCG: 0.25 CAPSULE ORAL at 11:10

## 2025-03-18 RX ADMIN — SODIUM CHLORIDE, PRESERVATIVE FREE 10 ML: 5 INJECTION INTRAVENOUS at 20:31

## 2025-03-18 RX ADMIN — DOCUSATE SODIUM 100 MG: 100 CAPSULE, LIQUID FILLED ORAL at 20:31

## 2025-03-18 RX ADMIN — DOFETILIDE 250 MCG: 0.25 CAPSULE ORAL at 22:05

## 2025-03-18 RX ADMIN — ROSUVASTATIN CALCIUM 20 MG: 20 TABLET, FILM COATED ORAL at 20:31

## 2025-03-18 RX ADMIN — ANTACID TABLETS 500 MG: 500 TABLET, CHEWABLE ORAL at 20:31

## 2025-03-18 NOTE — CARE COORDINATION
Case Management Assessment  Initial Evaluation    Date/Time of Evaluation: 3/18/2025 10:44 AM  Assessment Completed by: MANUEL Galicia    If patient is discharged prior to next notation, then this note serves as note for discharge by case management.    Patient Name: Aimee Rivera                   YOB: 1945  Diagnosis: Visit for monitoring Tikosyn therapy [Z51.81, Z79.899]                   Date / Time: 3/18/2025  7:52 AM    Patient Admission Status: Inpatient   Readmission Risk (Low < 19, Mod (19-27), High > 27): Readmission Risk Score: 8.7    Current PCP: Darian Weber, DO  PCP verified by CM? Yes    Chart Reviewed: Yes      History Provided by: Patient, Medical Record  Patient Orientation: Alert and Oriented, Person, Place, Situation, Self    Patient Cognition: Alert    Hospitalization in the last 30 days (Readmission):  No    If yes, Readmission Assessment in CM Navigator will be completed.    Advance Directives:      Code Status: Full Code   Patient's Primary Decision Maker is: Legal Next of Kin    Primary Decision Maker: Richie Rivera - Spouse - [phone]    Discharge Planning:    Patient lives with: Spouse/Significant Other Type of Home: House  Primary Care Giver: Self  Patient Support Systems include: Spouse/Significant Other, Children, Family Members, Friends/Neighbors   Current Financial resources: Medicare  Current community resources: None  Current services prior to admission: None            Current DME:              Type of Home Care services:  None    ADLS  Prior functional level: Independent in ADLs/IADLs  Current functional level: Independent in ADLs/IADLs    PT AM-PAC:   /24  OT AM-PAC:   /24    Family can provide assistance at DC: Yes  Would you like Case Management to discuss the discharge plan with any other family members/significant others, and if so, who? No  Plans to Return to Present Housing: Yes  Other Identified Issues/Barriers to RETURNING to current  identified by Patient or her  at present.    The Plan for Transition of Care is related to the following treatment goals of Visit for monitoring Tikosyn therapy [Z51.81, Z79.579]    IF APPLICABLE: The Patient and/or patient representative Aimee and her family were provided with a choice of provider and agrees with the discharge plan. Freedom of choice list with basic dialogue that supports the patient's individualized plan of care/goals and shares the quality data associated with the providers was provided to: Patient   Patient Representative Name:       The Patient and/or Patient Representative Agree with the Discharge Plan? Yes    MANUEL Galicia  Case Management Department  Ph: [phone] Fax: [phone]

## 2025-03-18 NOTE — PROGRESS NOTES
Writer called Dr. Dobson's office, he reviewed EKG and said patient is good to receive first tikosyn dose.

## 2025-03-18 NOTE — PROGRESS NOTES
Dr. Dobson at bedside to see the patient. Dr. Dobson reviewed post tikosyn EKG. Dr. Dobson is aware first dose was given at 1100. Okay to give next dose tonight at 2200, and morning dose at 0900 tomorrow after he reviews the EKG. Care ongoing.

## 2025-03-18 NOTE — CONSULTS
I, Addis Reid am scribing for and in the presence of Ant Dobson MD, MS, F.A.C.C..    Patient: Aimee Rivera  : 1945  Date of Admission: 3/18/2025  Primary Care Physician: Darian Weber  Today's Date: 3/18/2025    REASON FOR CONSULTATION: paroxysmal atrial fibrillation    HPI:  Ms. Rivera is a 79 y.o. female who has been electively admitted to the hospital for dofetilide loading because of recurrent, symptomatic, paroxysmal atrial fibrillation.    Ms. Rivera has a history of paroxysmal atrial fibrillation for several years and has had past hospitalizations for this in the past. Having said this, Ms. Rivera denies any current symptoms of chest pain, chest pressure or other symptoms.    She denied any current or recent chest pain, abdominal pain, bleeding problems, problems with her medications or any other concerns at this time.     Past Medical History:   Diagnosis Date    Atrial fibrillation (HCC)     Breast cancer (HCC)     CAD (coronary artery disease)     Heart attack (HCC) 2021    Hyperlipidemia     Hypertension     Skin cancer        CURRENT ALLERGIES: Patient has no known allergies. REVIEW OF SYSTEMS: 14 systems were reviewed. Pertinent positives and negatives as above, all else negative.     Past Surgical History:   Procedure Laterality Date    BREAST LUMPECTOMY Left     HYSTERECTOMY (CERVIX STATUS UNKNOWN)      JOINT REPLACEMENT Bilateral     SKIN BIOPSY      Social History:  Social History     Tobacco Use    Smoking status: Never    Smokeless tobacco: Never   Vaping Use    Vaping status: Never Used   Substance Use Topics    Alcohol use: Not Currently    Drug use: Never        CURRENT MEDICATIONS:  Outpatient Medications Marked as Taking for the 3/18/25 encounter (Hospital Encounter)   Medication Sig Dispense Refill    XARELTO 20 MG TABS tablet Take 1 tablet by mouth daily (with breakfast) 90 tablet 3    losartan-hydroCHLOROthiazide (HYZAAR) 100-25 MG per tablet Take 1 tablet by mouth  first dose will plan on making a dosing adjustment, but after that we will have to discontinue Tikosyn loading if her QTc goes over 500 ms. We will plan on loading the medication over 3 days.  ACE Inibitor/ARB: STOP losartan/HCTZ (Hyzaar) and START losartan (Cozaar) 100 mg daily. I also discussed the potential side effects of this medication including lightheadedness and dizziness and instructed them to stop the medication of this occurs and call our office if this occurs.      Finally, I would like to have her BMP and magnesium levels repeated daily. Once again, thank you for allowing me to participate in this patients care. Please do not hesitate to contact me could I be of further assistance.    Sincerely,  Ant Dobson MD, MS, F.A.C.C.  [facility] Cardiology Specialist    [address] [place] [address]  Phone: [phone], Fax: [phone]     I believe that the risk of significant morbidity and mortality related to the patient's current medical conditions are: Intermediate.      The documentation recorded by the scribe, accurately and completely reflects the services I personally performed and the decisions made by me. Ant Dobson MD, MS, F.A.C.C. March 18, 2025

## 2025-03-18 NOTE — H&P
History and Physical    Patient:  Aimee Rivera  MRN: [id number]    Chief Complaint:  Tikosyn Loading    History Obtained From:  patient, electronic medical record    PCP: Darian Weber DO    History of Present Illness:   The patient is a 79 y.o. female who presented as a direct admission for Tikosyn loading.  Patient has history of atrial fibrillation as well as MI.  Patient also has history of hypertension, HDL and CAD.  Patient is chronically on Xarelto.  Patient denied any chest pain or shortness of breath.  She denied any dizziness or lightheadedness.  Patient monitors her blood pressure and pulse at home.  Patient's PCP was concerned regarding some bradycardia.  Patient denied any palpitations.  She does report intermittent fatigue.  Patient did have a CAM monitor on on 3/3/2025 average heart rate was 60 bpm with 15.9% atrial fibrillation, 4 episodes of atrial tachycardia with longest of 15 beats.    Past Medical History:        Diagnosis Date    Atrial fibrillation (HCC)     Breast cancer (HCC)     CAD (coronary artery disease)     Heart attack (HCC) 07/2021    Hyperlipidemia     Hypertension     Skin cancer        Past Surgical History:        Procedure Laterality Date    BREAST LUMPECTOMY Left     HYSTERECTOMY (CERVIX STATUS UNKNOWN)      JOINT REPLACEMENT Bilateral     SKIN BIOPSY         Medications Prior to Admission:    Prior to Admission medications    Medication Sig Start Date End Date Taking? Authorizing Provider   XARELTO 20 MG TABS tablet Take 1 tablet by mouth daily (with breakfast) 9/18/24  Yes Ant Dobson MD   losartan-hydroCHLOROthiazide (HYZAAR) 100-25 MG per tablet Take 1 tablet by mouth daily 9/18/24  Yes Ant Dobson MD   bumetanide (BUMEX) 1 MG tablet Take 1 tablet by mouth daily 9/18/24  Yes Ant Dobson MD   spironolactone (ALDACTONE) 25 MG tablet Take 1 tablet by mouth daily 9/18/24  Yes Ant Dobson MD   metoprolol succinate (TOPROL XL) 25 MG extended release tablet Take 1 tablet

## 2025-03-19 LAB
ANION GAP SERPL CALCULATED.3IONS-SCNC: 11 MMOL/L (ref 9–16)
BUN SERPL-MCNC: 14 MG/DL (ref 8–23)
BUN/CREAT SERPL: 23 (ref 9–20)
CALCIUM SERPL-MCNC: 8.9 MG/DL (ref 8.6–10.4)
CHLORIDE SERPL-SCNC: 101 MMOL/L (ref 98–107)
CO2 SERPL-SCNC: 24 MMOL/L (ref 20–31)
CREAT SERPL-MCNC: 0.6 MG/DL (ref 0.5–0.9)
EKG ATRIAL RATE: 60 BPM
EKG ATRIAL RATE: 64 BPM
EKG P AXIS: 43 DEGREES
EKG P AXIS: 70 DEGREES
EKG P-R INTERVAL: 168 MS
EKG P-R INTERVAL: 180 MS
EKG Q-T INTERVAL: 460 MS
EKG Q-T INTERVAL: 482 MS
EKG QRS DURATION: 100 MS
EKG QRS DURATION: 104 MS
EKG QTC CALCULATION (BAZETT): 474 MS
EKG QTC CALCULATION (BAZETT): 482 MS
EKG R AXIS: -20 DEGREES
EKG R AXIS: -24 DEGREES
EKG T AXIS: 27 DEGREES
EKG T AXIS: 32 DEGREES
EKG VENTRICULAR RATE: 60 BPM
EKG VENTRICULAR RATE: 64 BPM
GFR, ESTIMATED: >90 ML/MIN/1.73M2
GLUCOSE SERPL-MCNC: 106 MG/DL (ref 74–99)
MAGNESIUM SERPL-MCNC: 1.8 MG/DL (ref 1.6–2.4)
POTASSIUM SERPL-SCNC: 3.8 MMOL/L (ref 3.7–5.3)
SODIUM SERPL-SCNC: 136 MMOL/L (ref 136–145)

## 2025-03-19 PROCEDURE — 2500000003 HC RX 250 WO HCPCS: Performed by: NURSE PRACTITIONER

## 2025-03-19 PROCEDURE — 6370000000 HC RX 637 (ALT 250 FOR IP): Performed by: FAMILY MEDICINE

## 2025-03-19 PROCEDURE — 99232 SBSQ HOSP IP/OBS MODERATE 35: CPT | Performed by: FAMILY MEDICINE

## 2025-03-19 PROCEDURE — 94761 N-INVAS EAR/PLS OXIMETRY MLT: CPT

## 2025-03-19 PROCEDURE — 93005 ELECTROCARDIOGRAM TRACING: CPT | Performed by: FAMILY MEDICINE

## 2025-03-19 PROCEDURE — 36415 COLL VENOUS BLD VENIPUNCTURE: CPT

## 2025-03-19 PROCEDURE — 1200000000 HC SEMI PRIVATE

## 2025-03-19 PROCEDURE — 93010 ELECTROCARDIOGRAM REPORT: CPT | Performed by: INTERNAL MEDICINE

## 2025-03-19 PROCEDURE — 80048 BASIC METABOLIC PNL TOTAL CA: CPT

## 2025-03-19 PROCEDURE — 6370000000 HC RX 637 (ALT 250 FOR IP): Performed by: NURSE PRACTITIONER

## 2025-03-19 PROCEDURE — 83735 ASSAY OF MAGNESIUM: CPT

## 2025-03-19 RX ADMIN — DOCUSATE SODIUM 100 MG: 100 CAPSULE, LIQUID FILLED ORAL at 08:14

## 2025-03-19 RX ADMIN — SODIUM CHLORIDE, PRESERVATIVE FREE 10 ML: 5 INJECTION INTRAVENOUS at 08:15

## 2025-03-19 RX ADMIN — ANTACID TABLETS 500 MG: 500 TABLET, CHEWABLE ORAL at 21:40

## 2025-03-19 RX ADMIN — PANTOPRAZOLE SODIUM 40 MG: 40 TABLET, DELAYED RELEASE ORAL at 08:14

## 2025-03-19 RX ADMIN — ANTACID TABLETS 500 MG: 500 TABLET, CHEWABLE ORAL at 08:14

## 2025-03-19 RX ADMIN — RIVAROXABAN 20 MG: 20 TABLET, FILM COATED ORAL at 08:14

## 2025-03-19 RX ADMIN — DOFETILIDE 250 MCG: 0.25 CAPSULE ORAL at 09:38

## 2025-03-19 RX ADMIN — METOPROLOL SUCCINATE 25 MG: 25 TABLET, EXTENDED RELEASE ORAL at 08:14

## 2025-03-19 RX ADMIN — DOCUSATE SODIUM 100 MG: 100 CAPSULE, LIQUID FILLED ORAL at 21:40

## 2025-03-19 RX ADMIN — LOSARTAN POTASSIUM 100 MG: 50 TABLET, FILM COATED ORAL at 08:15

## 2025-03-19 RX ADMIN — SODIUM CHLORIDE, PRESERVATIVE FREE 10 ML: 5 INJECTION INTRAVENOUS at 21:52

## 2025-03-19 RX ADMIN — SPIRONOLACTONE 25 MG: 25 TABLET ORAL at 08:14

## 2025-03-19 RX ADMIN — DOFETILIDE 250 MCG: 0.25 CAPSULE ORAL at 21:40

## 2025-03-19 RX ADMIN — BUMETANIDE 1 MG: 1 TABLET ORAL at 08:14

## 2025-03-19 RX ADMIN — ASPIRIN 81 MG: 81 TABLET, COATED ORAL at 08:14

## 2025-03-19 RX ADMIN — ROSUVASTATIN CALCIUM 20 MG: 20 TABLET, FILM COATED ORAL at 21:40

## 2025-03-19 NOTE — PROGRESS NOTES
I, Manuela Enrique am scribing for and in the presence of Ant Dobson MD, MS, F.A.C.C..    Patient: Aimee Rivera  : 1945  Date of Admission: 3/18/2025  Primary Care Physician: Darian Weber  Today's Date: 3/19/2025    REASON FOR CONSULTATION: paroxysmal atrial fibrillation      HPI:  Ms. Rivera is a 79 y.o. female who has been electively admitted to the hospital for dofetilide loading because of recurrent, symptomatic, paroxysmal atrial fibrillation.    Ms. Rivera has a history of paroxysmal atrial fibrillation for several years and has had past hospitalizations for this in the past. Having said this, Ms. Rivera denies any current symptoms of chest pain, chest pressure or other symptoms.    Ms. Rivera is at the end of her first day of Tikosyn loading. She has been feeling well today and denies having any problems. No leg swelling that he's noticed.     She denied any current or recent chest pain, abdominal pain, bleeding problems, problems with her medications or any other concerns at this time.     Past Medical History:   Diagnosis Date    Atrial fibrillation (HCC)     Breast cancer (HCC)     CAD (coronary artery disease)     Heart attack (HCC) 2021    Hyperlipidemia     Hypertension     Skin cancer        CURRENT ALLERGIES: Patient has no known allergies. REVIEW OF SYSTEMS: 14 systems were reviewed. Pertinent positives and negatives as above, all else negative.     Past Surgical History:   Procedure Laterality Date    BREAST LUMPECTOMY Left     HYSTERECTOMY (CERVIX STATUS UNKNOWN)      JOINT REPLACEMENT Bilateral     SKIN BIOPSY      Social History:  Social History     Tobacco Use    Smoking status: Never    Smokeless tobacco: Never   Vaping Use    Vaping status: Never Used   Substance Use Topics    Alcohol use: Not Currently    Drug use: Never        CURRENT MEDICATIONS:  Outpatient Medications Marked as Taking for the 3/18/25 encounter (Hospital Encounter)   Medication Sig Dispense Refill    XARELTO

## 2025-03-19 NOTE — PROGRESS NOTES
Progress Note    SUBJECTIVE:    Patient seen for f/u of Visit for monitoring Tikosyn therapy.  She resting in bed no distress. Feels well. No complaints     ROS:   Constitutional: negative  for fevers, and negative for chills.  Respiratory: negative for shortness of breath, negative for cough, and negative for wheezing  Cardiovascular: negative for chest pain, and negative for palpitations  Gastrointestinal: negative for abdominal pain, negative for nausea,negative for vomiting, negative for diarrhea, and negative for constipation     All other systems were reviewed with the patient and are negative unless otherwise stated in HPI      OBJECTIVE:      Vitals:   Vitals:    03/18/25 1900   BP: 126/65   Pulse: 58   Resp: 16   Temp: 97.9 °F (36.6 °C)   SpO2: 96%     Weight - Scale: 88 kg (194 lb 0.1 oz)   Height: 152.4 cm (5')     Weight  Wt Readings from Last 3 Encounters:   03/19/25 88 kg (194 lb 0.1 oz)   03/10/25 87.5 kg (192 lb 12.8 oz)   09/17/24 86.6 kg (191 lb)     Body mass index is 37.89 kg/m².    24HR INTAKE/OUTPUT:      Intake/Output Summary (Last 24 hours) at 3/19/2025 0705  Last data filed at 3/19/2025 0520  Gross per 24 hour   Intake 1200 ml   Output 2750 ml   Net -1550 ml     -----------------------------------------------------------------  Exam:    GEN:    Awake, alert and oriented x3.   EYES:  EOMI, pupils equal   NECK: Supple. No lymphadenopathy.  No carotid bruit  CVS:    regular rate and rhythm, no audible murmur  PULM:  CTA, no wheezes, rales or rhonchi, no acute respiratory distress  ABD:    Bowels sounds normal.  Abdomen is soft.  No distention.  no tenderness to palpation.   EXT:   no edema bilaterally .  No calf tenderness.   NEURO: Moves all extremities.  Motor and sensory are grossly intact  SKIN:  No rashes.  No skin lesions.    -----------------------------------------------------------------    Diagnostic Data:      Complete Blood Count:   Recent Labs     03/18/25  1030   WBC 5.7   RBC

## 2025-03-19 NOTE — PLAN OF CARE
Problem: Discharge Planning  Goal: Discharge to home or other facility with appropriate resources  3/19/2025 1119 by Judy Torres, RN  Outcome: Progressing     Problem: Safety - Adult  Goal: Free from fall injury  3/19/2025 1119 by Judy Torres, RN  Outcome: Progressing     Problem: ABCDS Injury Assessment  Goal: Absence of physical injury  3/19/2025 1119 by Judy Torres, RN  Outcome: Progressing

## 2025-03-19 NOTE — PROGRESS NOTES
Writer at bedside to complete evening assessment. Upon entry to room, pt in bed with visitors, respirations unlabored while on RA. Vitals obtained and assessment completed, see flow sheet for details. Pt denies needs from writer at this time. Call light in reach. Care is ongoing.

## 2025-03-19 NOTE — PROGRESS NOTES
Patient awake in bed, denies chest pain or shortness of breath, vitals and assessment initiated, call light within reach, whiteboard updated, discussed Tikosyn and EKG today.

## 2025-03-19 NOTE — PLAN OF CARE
Problem: Discharge Planning  Goal: Discharge to home or other facility with appropriate resources  Outcome: Progressing  Flowsheets (Taken 3/18/2025 1900)  Discharge to home or other facility with appropriate resources:   Identify barriers to discharge with patient and caregiver   Arrange for needed discharge resources and transportation as appropriate   Identify discharge learning needs (meds, wound care, etc)   Refer to discharge planning if patient needs post-hospital services based on physician order or complex needs related to functional status, cognitive ability or social support system     Problem: Safety - Adult  Goal: Free from fall injury  Outcome: Progressing     Problem: ABCDS Injury Assessment  Goal: Absence of physical injury  Outcome: Progressing

## 2025-03-19 NOTE — PROGRESS NOTES
Nutrition Assessment     Type and Reason for Visit: Initial    Nutrition Recommendations/Plan:   Continue current diet.     Malnutrition Assessment:  Malnutrition Status: No malnutrition    Nutrition Assessment:  Obesity r/t excess energy intakes relative to expenditure, AEB BMI >35. Weight stable overall. Admitted for Tikosyn dosing and lower nutrition risk. Good appetite and intakes and can report lower sodium diet at home. Denies nutrition questions or concerns.    Nutrition Related Findings:   active b/s. no edema. Wound Type: None    Current Nutrition Therapies:    ADULT DIET; Regular    Anthropometric Measures:  Height: 152.4 cm (5')  Current Body Wt: 88 kg (194 lb 0.1 oz)   BMI: 37.9        Nutrition Diagnosis:   Overweight/obese related to excessive energy intake as evidenced by BMI    Lab Results   Component Value Date     03/19/2025    K 3.8 03/19/2025     03/19/2025    CO2 24 03/19/2025    BUN 14 03/19/2025    CREATININE 0.6 03/19/2025    GLUCOSE 106 (H) 03/19/2025    CALCIUM 8.9 03/19/2025    BILITOT 0.4 09/25/2024    ALKPHOS 42 09/25/2024    AST 26 09/25/2024    ALT 21 09/25/2024    LABGLOM >90 03/19/2025    GFRAA >60 01/13/2022       No results found for: \"LABA1C\"  No results found for: \"VITD25\"    Nutrition Interventions:   Food and/or Nutrient Delivery: Continue Current Diet  Nutrition Education/Counseling: No recommendation at this time  Coordination of Nutrition Care: Continue to monitor while inpatient  Plan of Care discussed with: patient and spouse    Goals:  Goals: Meet at least 75% of estimated needs  Type of Goal: New goal  Previous Goal Met: New Goal    Nutrition Monitoring and Evaluation:   Behavioral-Environmental Outcomes: None Identified  Food/Nutrient Intake Outcomes: Food and Nutrient Intake  Physical Signs/Symptoms Outcomes: Biochemical Data, Weight    Discharge Planning:    Continue current diet     Emanuel Virgen RD, AIYANA  Contact: [phone]

## 2025-03-20 LAB
ANION GAP SERPL CALCULATED.3IONS-SCNC: 8 MMOL/L (ref 9–16)
BUN SERPL-MCNC: 14 MG/DL (ref 8–23)
BUN/CREAT SERPL: 23 (ref 9–20)
CALCIUM SERPL-MCNC: 8.9 MG/DL (ref 8.6–10.4)
CHLORIDE SERPL-SCNC: 104 MMOL/L (ref 98–107)
CO2 SERPL-SCNC: 25 MMOL/L (ref 20–31)
CREAT SERPL-MCNC: 0.6 MG/DL (ref 0.5–0.9)
EKG ATRIAL RATE: 56 BPM
EKG P AXIS: 44 DEGREES
EKG P-R INTERVAL: 176 MS
EKG Q-T INTERVAL: 506 MS
EKG QRS DURATION: 104 MS
EKG QTC CALCULATION (BAZETT): 488 MS
EKG R AXIS: -18 DEGREES
EKG T AXIS: 19 DEGREES
EKG VENTRICULAR RATE: 56 BPM
GFR, ESTIMATED: >90 ML/MIN/1.73M2
GLUCOSE SERPL-MCNC: 120 MG/DL (ref 74–99)
MAGNESIUM SERPL-MCNC: 2 MG/DL (ref 1.6–2.4)
POTASSIUM SERPL-SCNC: 3.8 MMOL/L (ref 3.7–5.3)
SODIUM SERPL-SCNC: 137 MMOL/L (ref 136–145)

## 2025-03-20 PROCEDURE — 80048 BASIC METABOLIC PNL TOTAL CA: CPT

## 2025-03-20 PROCEDURE — 6370000000 HC RX 637 (ALT 250 FOR IP): Performed by: FAMILY MEDICINE

## 2025-03-20 PROCEDURE — 94761 N-INVAS EAR/PLS OXIMETRY MLT: CPT

## 2025-03-20 PROCEDURE — 93010 ELECTROCARDIOGRAM REPORT: CPT | Performed by: INTERNAL MEDICINE

## 2025-03-20 PROCEDURE — 6370000000 HC RX 637 (ALT 250 FOR IP): Performed by: NURSE PRACTITIONER

## 2025-03-20 PROCEDURE — 93005 ELECTROCARDIOGRAM TRACING: CPT | Performed by: FAMILY MEDICINE

## 2025-03-20 PROCEDURE — 2500000003 HC RX 250 WO HCPCS: Performed by: NURSE PRACTITIONER

## 2025-03-20 PROCEDURE — 83735 ASSAY OF MAGNESIUM: CPT

## 2025-03-20 PROCEDURE — 1200000000 HC SEMI PRIVATE

## 2025-03-20 PROCEDURE — 36415 COLL VENOUS BLD VENIPUNCTURE: CPT

## 2025-03-20 PROCEDURE — 99232 SBSQ HOSP IP/OBS MODERATE 35: CPT | Performed by: FAMILY MEDICINE

## 2025-03-20 RX ORDER — DOFETILIDE 0.25 MG/1
250 CAPSULE ORAL 2 TIMES DAILY
Qty: 60 CAPSULE | Refills: 3 | Status: SHIPPED | OUTPATIENT
Start: 2025-03-20

## 2025-03-20 RX ADMIN — ANTACID TABLETS 500 MG: 500 TABLET, CHEWABLE ORAL at 21:15

## 2025-03-20 RX ADMIN — DOCUSATE SODIUM 100 MG: 100 CAPSULE, LIQUID FILLED ORAL at 21:16

## 2025-03-20 RX ADMIN — DOCUSATE SODIUM 100 MG: 100 CAPSULE, LIQUID FILLED ORAL at 08:58

## 2025-03-20 RX ADMIN — BUMETANIDE 1 MG: 1 TABLET ORAL at 08:57

## 2025-03-20 RX ADMIN — DOFETILIDE 250 MCG: 0.25 CAPSULE ORAL at 21:15

## 2025-03-20 RX ADMIN — PANTOPRAZOLE SODIUM 40 MG: 40 TABLET, DELAYED RELEASE ORAL at 07:53

## 2025-03-20 RX ADMIN — METOPROLOL SUCCINATE 25 MG: 25 TABLET, EXTENDED RELEASE ORAL at 08:58

## 2025-03-20 RX ADMIN — DOFETILIDE 250 MCG: 0.25 CAPSULE ORAL at 08:58

## 2025-03-20 RX ADMIN — SODIUM CHLORIDE, PRESERVATIVE FREE 10 ML: 5 INJECTION INTRAVENOUS at 09:01

## 2025-03-20 RX ADMIN — SPIRONOLACTONE 25 MG: 25 TABLET ORAL at 08:58

## 2025-03-20 RX ADMIN — ROSUVASTATIN CALCIUM 20 MG: 20 TABLET, FILM COATED ORAL at 21:15

## 2025-03-20 RX ADMIN — LOSARTAN POTASSIUM 100 MG: 50 TABLET, FILM COATED ORAL at 08:58

## 2025-03-20 RX ADMIN — RIVAROXABAN 20 MG: 20 TABLET, FILM COATED ORAL at 07:53

## 2025-03-20 RX ADMIN — SODIUM CHLORIDE, PRESERVATIVE FREE 10 ML: 5 INJECTION INTRAVENOUS at 21:16

## 2025-03-20 RX ADMIN — ANTACID TABLETS 500 MG: 500 TABLET, CHEWABLE ORAL at 08:58

## 2025-03-20 RX ADMIN — ASPIRIN 81 MG: 81 TABLET, COATED ORAL at 08:58

## 2025-03-20 NOTE — PROGRESS NOTES
Writer spoke to Dr. Dobson to get the ok to give the patient's evening dose of tikosyn. Dr. Dobson did not get a copy of the EKG from this morning and asked for it to be sent to his email. EKG was scanned and sent to both emails. Waiting for reply back.    1% Lidocaine No

## 2025-03-20 NOTE — PROGRESS NOTES
I, Manuela Enrique am scribing for and in the presence of Ant Dobson MD, MS, F.A.C.C..    Patient: Aimee Rivera  : 1945  Date of Admission: 3/18/2025  Primary Care Physician: Darian Weber  Today's Date: 3/20/2025    REASON FOR CONSULTATION: paroxysmal atrial fibrillation      HPI:  Ms. Rivera is a 79 y.o. female who has been electively admitted to the hospital for dofetilide loading because of recurrent, symptomatic, paroxysmal atrial fibrillation.    Ms. Rivera has a history of paroxysmal atrial fibrillation for several years and has had past hospitalizations for this in the past. Having said this, Ms. Rivera denies any current symptoms of chest pain, chest pressure or other symptoms.    Ms. Rivera is at the end of her second day of Tikosyn loading. She has been feeling well today and denies having any problems. No leg swelling that he's noticed.     She denied any current or recent chest pain, abdominal pain, bleeding problems, problems with her medications or any other concerns at this time.     Past Medical History:   Diagnosis Date    Atrial fibrillation (HCC)     Breast cancer (HCC)     CAD (coronary artery disease)     Heart attack (HCC) 2021    Hyperlipidemia     Hypertension     Skin cancer        CURRENT ALLERGIES: Patient has no known allergies. REVIEW OF SYSTEMS: 14 systems were reviewed. Pertinent positives and negatives as above, all else negative.     Past Surgical History:   Procedure Laterality Date    BREAST LUMPECTOMY Left     HYSTERECTOMY (CERVIX STATUS UNKNOWN)      JOINT REPLACEMENT Bilateral     SKIN BIOPSY      Social History:  Social History     Tobacco Use    Smoking status: Never    Smokeless tobacco: Never   Vaping Use    Vaping status: Never Used   Substance Use Topics    Alcohol use: Not Currently    Drug use: Never        CURRENT MEDICATIONS:  Outpatient Medications Marked as Taking for the 3/18/25 encounter (Hospital Encounter)   Medication Sig Dispense Refill    XARELTO  ECGs e-mail to me. No additional dose of Tikosyn should be given until each ECG's QTc is confirmed to be less than 500 ms. If his QTC goes over 500 after the first dose will plan on making a dosing adjustment, but after that we will have to discontinue Tikosyn loading if her QTc goes over 500 ms. We will plan on loading the medication over 3 days.    Finally, I would like to have her BMP and magnesium levels repeated daily.     I discussed this with Rayna Rao C[facility] who was also in agreement with the plan. Once again, thank you for allowing me to participate in this patients care. Please do not hesitate to contact me could I be of further assistance.    Sincerely,  Ant Dobson MD, MS, F.A.C.C.  [facility] Cardiology Specialist    [address] [place], [place] [address]  Phone: [phone], Fax: [phone]     I believe that the risk of significant morbidity and mortality related to the patient's current medical conditions are: Intermediate.      The documentation recorded by the scribe, accurately and completely reflects the services I personally performed and the decisions made by me. Ant Dobson MD, MS, F.A.C.C. March 20, 2025

## 2025-03-20 NOTE — PROGRESS NOTES
Progress Note    SUBJECTIVE:    Patient seen for f/u of Visit for monitoring Tikosyn therapy.  She sitting up in chair no distress. Feels well. No complaints     ROS:   Constitutional: negative  for fevers, and negative for chills.  Respiratory: negative for shortness of breath, negative for cough, and negative for wheezing  Cardiovascular: negative for chest pain, and negative for palpitations  Gastrointestinal: negative for abdominal pain, negative for nausea,negative for vomiting, negative for diarrhea, and negative for constipation     All other systems were reviewed with the patient and are negative unless otherwise stated in HPI      OBJECTIVE:      Vitals:   Vitals:    03/20/25 0745   BP: (!) 146/77   Pulse: 62   Resp: 20   Temp: 97.8 °F (36.6 °C)   SpO2: 96%     Weight - Scale: 87.8 kg (193 lb 9 oz)   Height: 152.4 cm (5')     Weight  Wt Readings from Last 3 Encounters:   03/20/25 87.8 kg (193 lb 9 oz)   03/10/25 87.5 kg (192 lb 12.8 oz)   09/17/24 86.6 kg (191 lb)     Body mass index is 37.8 kg/m².    24HR INTAKE/OUTPUT:      Intake/Output Summary (Last 24 hours) at 3/20/2025 0951  Last data filed at 3/20/2025 0725  Gross per 24 hour   Intake 480 ml   Output 2800 ml   Net -2320 ml     -----------------------------------------------------------------  Exam:    GEN:    Awake, alert and oriented x3.   EYES:  EOMI, pupils equal   NECK: Supple. No lymphadenopathy.  No carotid bruit  CVS:    regular rate and rhythm, no audible murmur  PULM:  CTA, no wheezes, rales or rhonchi, no acute respiratory distress  ABD:    Bowels sounds normal.  Abdomen is soft.  No distention.  no tenderness to palpation.   EXT:   no edema bilaterally .  No calf tenderness.   NEURO: Moves all extremities.  Motor and sensory are grossly intact  SKIN:  No rashes.  No skin lesions.    -----------------------------------------------------------------    Diagnostic Data:      Complete Blood Count:   Recent Labs     03/18/25  1030   WBC 5.7    RBC 3.94*   HGB 12.6   HCT 35.8*   MCV 90.9   MCH 32.0   MCHC 35.2*   RDW 12.7      MPV 10.6        Last 3 Blood Glucose:   Recent Labs     03/18/25  1030 03/19/25  0525 03/20/25  0530   GLUCOSE 111* 106* 120*        Comprehensive Metabolic Profile:   Recent Labs     03/18/25  1030 03/19/25  0525 03/20/25  0530   * 136 137   K 3.9 3.8 3.8    101 104   CO2 26 24 25   BUN 21 14 14   CREATININE 0.8 0.6 0.6   GLUCOSE 111* 106* 120*   CALCIUM 9.4 8.9 8.9        Urinalysis:   Lab Results   Component Value Date/Time    NITRU NEGATIVE 12/12/2013 01:20 PM    COLORU YELLOW 12/12/2013 01:20 PM    PHUR 7.0 12/12/2013 01:20 PM    WBCUA 2 TO 5 12/12/2013 01:20 PM    RBCUA 0 TO 2 12/12/2013 01:20 PM    MUCUS NOT REPORTED 12/12/2013 01:20 PM    TRICHOMONAS NOT REPORTED 12/12/2013 01:20 PM    YEAST NOT REPORTED 12/12/2013 01:20 PM    BACTERIA TRACE 12/12/2013 01:20 PM    LEUKOCYTESUR SMALL 12/12/2013 01:20 PM    UROBILINOGEN Normal 12/12/2013 01:20 PM    BILIRUBINUR NEGATIVE 12/12/2013 01:20 PM    GLUCOSEU NEGATIVE 12/12/2013 01:20 PM    KETUA NEGATIVE 12/12/2013 01:20 PM    AMORPHOUS NOT REPORTED 12/12/2013 01:20 PM       HgBA1c:  No results found for: \"LABA1C\"    Lactic Acid: No results found for: \"LACTA\"     Troponin: No results for input(s): \"TROPONINI\" in the last 72 hours.    CRP:  No results for input(s): \"CRP\" in the last 72 hours.      Radiology/Imaging:  No orders to display         ASSESSMENT / PLAN:    MEDICAL DECISION MAKING:    Primary Problem(s): Visit for monitoring Tikosyn therapy  Differential diagnoses: PAF  Condition is a chronic illness with exacerbation, progression or side effects of treatment  Condition is stable  Treatment plan:   Appreciate Dr. Dobson  Daily BMP and magnesium  Telemetry  ROCIO  Imaging:   EKG now and as needed  Medications:   Stop sotalol  Tikosyn dosing per Dr. Dobson  Continue Toprol-XL, Xarelto  Medication Monitoring / High Risk Medications: Tikosyn      HTN    Condition

## 2025-03-20 NOTE — PLAN OF CARE
Problem: Discharge Planning  Goal: Discharge to home or other facility with appropriate resources  Outcome: Progressing     Problem: Safety - Adult  Goal: Free from fall injury  Outcome: Progressing     Problem: ABCDS Injury Assessment  Goal: Absence of physical injury  Outcome: Progressing     Problem: Nutrition Deficit:  Goal: Optimize nutritional status  Outcome: Progressing

## 2025-03-20 NOTE — PROGRESS NOTES
Physician Progress Note      PATIENT:               RADHA NAVARRETE  CSN #:                  [id number]  :                       1945  ADMIT DATE:       3/18/2025 7:52 AM  DISCH DATE:  RESPONDING  PROVIDER #:        ABBIE Bates CNP          QUERY TEXT:    Patient admitted with paroxysmal afib and is maintained on Xarelto. If   possible, please document in progress notes and discharge summary if you are   evaluating and/or treating any of the following:?  ?  The medical record reflects the following:    Risk Factors: 78 yo female, HTN  Clinical Indicators: maintained on Xarelto  Treatment: bleeding precautions  Options provided:  -- Secondary hypercoagulable state in a patient with atrial fibrillation  -- Other - I will add my own diagnosis  -- Disagree - Not applicable / Not valid  -- Disagree - Clinically unable to determine / Unknown  -- Refer to Clinical Documentation Reviewer    PROVIDER RESPONSE TEXT:    This patient has secondary hypercoagulable state in a patient with atrial   fibrillation.    Query created by: Jayy Rainey on 3/19/2025 4:51 PM      Electronically signed by:  ABBIE Bates CNP 3/20/2025 9:51 AM

## 2025-03-20 NOTE — PROGRESS NOTES
Writer at bedside to complete evening assessment. Upon entry to room, pt visiting with friend, respirations even and non-labored while on room air. Vitals obtained and assessment completed, see flow sheet for details. Pt denies needs from writer at this time. Call light in reach. Care is ongoing.

## 2025-03-20 NOTE — PROGRESS NOTES
Patient in bed, assessment and vitals initiated, call light within reach, whiteboard updated, discussed Tikosyn to be given and EKG to follow.

## 2025-03-20 NOTE — PLAN OF CARE
Problem: Discharge Planning  Goal: Discharge to home or other facility with appropriate resources  3/19/2025 2255 by Stephenie Browne, RN  Outcome: Progressing  Flowsheets (Taken 3/19/2025 2255)  Discharge to home or other facility with appropriate resources: Identify barriers to discharge with patient and caregiver     Problem: Safety - Adult  Goal: Free from fall injury  3/19/2025 2257 by Stephenie Bronwe, RN  Outcome: Progressing     Problem: ABCDS Injury Assessment  Goal: Absence of physical injury  3/19/2025 2257 by Stephenie Browne, RN  Outcome: Progressing

## 2025-03-21 VITALS
HEART RATE: 55 BPM | SYSTOLIC BLOOD PRESSURE: 140 MMHG | HEIGHT: 60 IN | OXYGEN SATURATION: 96 % | BODY MASS INDEX: 37.96 KG/M2 | WEIGHT: 193.34 LBS | TEMPERATURE: 97.4 F | RESPIRATION RATE: 16 BRPM | DIASTOLIC BLOOD PRESSURE: 64 MMHG

## 2025-03-21 LAB
ANION GAP SERPL CALCULATED.3IONS-SCNC: 8 MMOL/L (ref 9–16)
BUN SERPL-MCNC: 15 MG/DL (ref 8–23)
BUN/CREAT SERPL: 25 (ref 9–20)
CALCIUM SERPL-MCNC: 8.8 MG/DL (ref 8.6–10.4)
CHLORIDE SERPL-SCNC: 107 MMOL/L (ref 98–107)
CO2 SERPL-SCNC: 25 MMOL/L (ref 20–31)
CREAT SERPL-MCNC: 0.6 MG/DL (ref 0.5–0.9)
EKG ATRIAL RATE: 56 BPM
EKG ATRIAL RATE: 62 BPM
EKG P AXIS: 32 DEGREES
EKG P AXIS: 42 DEGREES
EKG P-R INTERVAL: 178 MS
EKG P-R INTERVAL: 180 MS
EKG Q-T INTERVAL: 476 MS
EKG Q-T INTERVAL: 488 MS
EKG QRS DURATION: 100 MS
EKG QRS DURATION: 94 MS
EKG QTC CALCULATION (BAZETT): 459 MS
EKG QTC CALCULATION (BAZETT): 495 MS
EKG R AXIS: -22 DEGREES
EKG R AXIS: -23 DEGREES
EKG T AXIS: 25 DEGREES
EKG T AXIS: 32 DEGREES
EKG VENTRICULAR RATE: 56 BPM
EKG VENTRICULAR RATE: 62 BPM
GFR, ESTIMATED: >90 ML/MIN/1.73M2
GLUCOSE SERPL-MCNC: 118 MG/DL (ref 74–99)
MAGNESIUM SERPL-MCNC: 2 MG/DL (ref 1.6–2.4)
POTASSIUM SERPL-SCNC: 4.3 MMOL/L (ref 3.7–5.3)
SODIUM SERPL-SCNC: 140 MMOL/L (ref 136–145)

## 2025-03-21 PROCEDURE — 94761 N-INVAS EAR/PLS OXIMETRY MLT: CPT

## 2025-03-21 PROCEDURE — 6370000000 HC RX 637 (ALT 250 FOR IP): Performed by: NURSE PRACTITIONER

## 2025-03-21 PROCEDURE — 80048 BASIC METABOLIC PNL TOTAL CA: CPT

## 2025-03-21 PROCEDURE — 83735 ASSAY OF MAGNESIUM: CPT

## 2025-03-21 PROCEDURE — 2500000003 HC RX 250 WO HCPCS: Performed by: NURSE PRACTITIONER

## 2025-03-21 PROCEDURE — 6370000000 HC RX 637 (ALT 250 FOR IP): Performed by: FAMILY MEDICINE

## 2025-03-21 PROCEDURE — 93010 ELECTROCARDIOGRAM REPORT: CPT | Performed by: INTERNAL MEDICINE

## 2025-03-21 PROCEDURE — 36415 COLL VENOUS BLD VENIPUNCTURE: CPT

## 2025-03-21 PROCEDURE — 99232 SBSQ HOSP IP/OBS MODERATE 35: CPT | Performed by: FAMILY MEDICINE

## 2025-03-21 RX ORDER — LOSARTAN POTASSIUM 100 MG/1
100 TABLET ORAL DAILY
Qty: 30 TABLET | Refills: 3 | Status: SHIPPED | OUTPATIENT
Start: 2025-03-22

## 2025-03-21 RX ADMIN — SPIRONOLACTONE 25 MG: 25 TABLET ORAL at 07:51

## 2025-03-21 RX ADMIN — ANTACID TABLETS 500 MG: 500 TABLET, CHEWABLE ORAL at 07:51

## 2025-03-21 RX ADMIN — DOFETILIDE 250 MCG: 0.25 CAPSULE ORAL at 09:04

## 2025-03-21 RX ADMIN — BUMETANIDE 1 MG: 1 TABLET ORAL at 07:51

## 2025-03-21 RX ADMIN — PANTOPRAZOLE SODIUM 40 MG: 40 TABLET, DELAYED RELEASE ORAL at 07:51

## 2025-03-21 RX ADMIN — DOCUSATE SODIUM 100 MG: 100 CAPSULE, LIQUID FILLED ORAL at 07:51

## 2025-03-21 RX ADMIN — SODIUM CHLORIDE, PRESERVATIVE FREE 10 ML: 5 INJECTION INTRAVENOUS at 07:52

## 2025-03-21 RX ADMIN — METOPROLOL SUCCINATE 25 MG: 25 TABLET, EXTENDED RELEASE ORAL at 07:51

## 2025-03-21 RX ADMIN — RIVAROXABAN 20 MG: 20 TABLET, FILM COATED ORAL at 07:51

## 2025-03-21 RX ADMIN — ASPIRIN 81 MG: 81 TABLET, COATED ORAL at 07:51

## 2025-03-21 RX ADMIN — LOSARTAN POTASSIUM 100 MG: 50 TABLET, FILM COATED ORAL at 07:51

## 2025-03-21 NOTE — PROGRESS NOTES
Reviewed discharge instructions with patient and spouse.   Patient aware of need to  new prescriptions.   Reviewed new medications with patient and side effects to monitor for.   Reviewed home medications and when next dose is due.   Patient aware of date/time of follow up appointment.  Aware of need for repeat lab on 3/28/25.  Lab order form given.   Educational handout given on Tikosyn.  Questions answered.   Verbalizes understanding.  Copy of discharge instructions given to patient.

## 2025-03-21 NOTE — PLAN OF CARE
Problem: Discharge Planning  Goal: Discharge to home or other facility with appropriate resources  Outcome: Adequate for Discharge     Problem: Safety - Adult  Goal: Free from fall injury  Outcome: Adequate for Discharge     Problem: ABCDS Injury Assessment  Goal: Absence of physical injury  Outcome: Adequate for Discharge     Problem: Nutrition Deficit:  Goal: Optimize nutritional status  Outcome: Adequate for Discharge

## 2025-03-21 NOTE — PROGRESS NOTES
Progress Note    SUBJECTIVE:    Patient seen for f/u of Visit for monitoring Tikosyn therapy.  She resting in bed no distress. Feels well. No complaints     ROS:   Constitutional: negative  for fevers, and negative for chills.  Respiratory: negative for shortness of breath, negative for cough, and negative for wheezing  Cardiovascular: negative for chest pain, and negative for palpitations  Gastrointestinal: negative for abdominal pain, negative for nausea,negative for vomiting, negative for diarrhea, and negative for constipation     All other systems were reviewed with the patient and are negative unless otherwise stated in HPI      OBJECTIVE:      Vitals:   Vitals:    03/20/25 1850   BP: (!) 145/74   Pulse: 56   Resp: 18   Temp: 97.4 °F (36.3 °C)   SpO2: 96%     Weight - Scale: 87.7 kg (193 lb 5.5 oz)   Height: 152.4 cm (5')     Weight  Wt Readings from Last 3 Encounters:   03/21/25 87.7 kg (193 lb 5.5 oz)   03/10/25 87.5 kg (192 lb 12.8 oz)   09/17/24 86.6 kg (191 lb)     Body mass index is 37.76 kg/m².    24HR INTAKE/OUTPUT:      Intake/Output Summary (Last 24 hours) at 3/21/2025 0711  Last data filed at 3/20/2025 2300  Gross per 24 hour   Intake --   Output 3000 ml   Net -3000 ml     -----------------------------------------------------------------  Exam:    GEN:    Awake, alert and oriented x3.   EYES:  EOMI, pupils equal   NECK: Supple. No lymphadenopathy.  No carotid bruit  CVS:    regular rate and rhythm, no audible murmur  PULM:  CTA, no wheezes, rales or rhonchi, no acute respiratory distress  ABD:    Bowels sounds normal.  Abdomen is soft.  No distention.  no tenderness to palpation.   EXT:   no edema bilaterally .  No calf tenderness.   NEURO: Moves all extremities.  Motor and sensory are grossly intact  SKIN:  No rashes.  No skin lesions.    -----------------------------------------------------------------    Diagnostic Data:      Complete Blood Count:   Recent Labs     03/18/25  1030   WBC 5.7   RBC     Condition is a chronic stable condition  Treatment plan: Continue current treatment  Imaging: no further imaging studies ordered today  Medications:   Continue losartan, Aldactone, Toprol XL, Bumex     Nutrition status:   Well developed, well nourished with no malnutrition  Dietician consult initiated     Hospital Prophylaxis:   DVT: Xarelto   Stress Ulcer: PPI      Disposition:  Shared decision making: All test results, treatment options and disposition options were discussed with the patient today  Social determinants of health that may impact management: none  Code status: Full Code   Disposition: Discharge plan is home today    [facility] Advanced Care Planning documentation:  [x] I have confirmed that the patient's Advance Care Plan is present, Code Status is documented, or surrogate decision maker is listed in the patient's medical record  [If \"yes\", STOP HERE]     [] The patient's Advance Care Plan is NOT present because:    []  I confirmed today that the patient does not wish or was not able to name a   surrogate decision maker or provide and advance care plan.    [] Hospice care is currently being provided or has been provided within the   calendar year.    []  I did NOT confirm today the presence of an Advance Care Plan or surrogate   decision maker documented within the patient's medical record.   [DOES NOT SATISFY [facility] PERFORMANCE]    FADI Ruiz - CNP , FADI, NP-C  [facility] Medicine        3/21/2025, 7:11 AM

## 2025-03-21 NOTE — CARE COORDINATION
IMM letter provided to patient.  Patient offered four hours to make informed decision regarding appeal process; patient agreeable to discharge.     03/21/25 1134   IMM Letter   IMM Letter given to Patient/Family/Significant other/Guardian/POA/by: second- patient/ JOAN JACKSON   IMM Letter date given: 03/21/25   IMM Letter time given: 1105     MANUEL Jimenez

## 2025-03-21 NOTE — PROGRESS NOTES
I, Manuela Enrique am scribing for and in the presence of Ant Dobson MD, MS, F.A.C.C..    Patient: Aimee Rivera  : 1945  Date of Admission: 3/18/2025  Primary Care Physician: Darian Weber  Today's Date: 3/21/2025    REASON FOR CONSULTATION: paroxysmal atrial fibrillation      HPI:  Ms. Rivera is a 79 y.o. female who has been electively admitted to the hospital for dofetilide loading because of recurrent, symptomatic, paroxysmal atrial fibrillation.    Ms. Rivera has a history of paroxysmal atrial fibrillation for several years and has had past hospitalizations for this in the past. Having said this, Ms. Rivera denies any current symptoms of chest pain, chest pressure or other symptoms.    Ms. Rivera is at the end of her second day of Tikosyn loading. She has been feeling well today and denies having any problems. No significant leg swelling that she's noticed. She says she is ready to go home.    She denied any current or recent chest pain, abdominal pain, bleeding problems, problems with her medications or any other concerns at this time.     Past Medical History:   Diagnosis Date    Atrial fibrillation (HCC)     Breast cancer (HCC)     CAD (coronary artery disease)     Heart attack (HCC) 2021    Hyperlipidemia     Hypertension     Skin cancer        CURRENT ALLERGIES: Patient has no known allergies. REVIEW OF SYSTEMS: 14 systems were reviewed. Pertinent positives and negatives as above, all else negative.     Past Surgical History:   Procedure Laterality Date    BREAST LUMPECTOMY Left     HYSTERECTOMY (CERVIX STATUS UNKNOWN)      JOINT REPLACEMENT Bilateral     SKIN BIOPSY      Social History:  Social History     Tobacco Use    Smoking status: Never    Smokeless tobacco: Never   Vaping Use    Vaping status: Never Used   Substance Use Topics    Alcohol use: Not Currently    Drug use: Never        CURRENT MEDICATIONS:  Outpatient Medications Marked as Taking for the 3/18/25 encounter ([facility]  Encounter)   Medication Sig Dispense Refill    XARELTO 20 MG TABS tablet Take 1 tablet by mouth daily (with breakfast) 90 tablet 3    bumetanide (BUMEX) 1 MG tablet Take 1 tablet by mouth daily 90 tablet 3    spironolactone (ALDACTONE) 25 MG tablet Take 1 tablet by mouth daily 90 tablet 3    metoprolol succinate (TOPROL XL) 25 MG extended release tablet Take 1 tablet by mouth daily 90 tablet 3    [DISCONTINUED] losartan-hydroCHLOROthiazide (HYZAAR) 100-25 MG per tablet Take 1 tablet by mouth daily 90 tablet 3    Artificial Tear Ointment (DRY EYES OP) Apply 2 drops to eye as needed      aspirin 81 MG EC tablet Take 1 tablet by mouth daily      calcium carbonate (OSCAL) 500 MG TABS tablet Take 1 tablet by mouth 2 times daily Caltrate-600-vitamin D      docusate sodium (COLACE) 100 MG capsule Take 1 capsule by mouth 2 times daily      rosuvastatin (CRESTOR) 20 MG tablet Take 1 tablet by mouth daily 90 tablet 3    omeprazole (PRILOSEC) 40 MG delayed release capsule Take 1 capsule by mouth daily         FAMILY HISTORY: family history includes Atrial Fibrillation in her sister and sister; Cancer in her sister; Diabetes in her brother; Heart Attack in her father; Heart Disease in her brother.     PHYSICAL EXAM:   BP (!) 140/64   Pulse 55   Temp 97.4 °F (36.3 °C) (Temporal)   Resp 16   Ht 1.524 m (5')   Wt 87.7 kg (193 lb 5.5 oz)   SpO2 96%   BMI 37.76 kg/m²  Body mass index is 37.76 kg/m².    Constitutional: She is oriented to person, place, and time. She appears well-developed and well-nourished. In no acute distress.   HEENT: Normocephalic and atraumatic.No JVD present. Carotid bruit is not present. No mass and no thyromegaly present. No lymphadenopathy present.  Cardiovascular: Normal rate, regular rhythm, normal heart sounds. Exam reveals no gallop and no friction rubs. 1/6 systolic murmur, 5th intercostal space on the LEFT in the mid-clavicular line (cardiac apex).   Pulmonary/Chest: Effort normal and breath

## 2025-03-21 NOTE — PROGRESS NOTES
Patient A&O x4, calm, and cooperative. Vital signs and head to toe assessment completed at this time, see flowsheets for details.   Patient was briefly educated on medications due tonight. Patient denies needs at this time. Call light within reach. Care ongoing.

## 2025-03-21 NOTE — FLOWSHEET NOTE
Resting in bed with eyes closed. Awake for vitals and assessment. Alert and oriented x 4. Denies chest pain, SOB. No needs at present time. Call light within reach. Continue to monitor

## 2025-03-21 NOTE — PLAN OF CARE
Problem: Discharge Planning  Goal: Discharge to home or other facility with appropriate resources  3/20/2025 2141 by Teresita Ellis RN  Outcome: Progressing  3/20/2025 1426 by Judy Torres RN  Outcome: Progressing     Problem: Safety - Adult  Goal: Free from fall injury  3/20/2025 2141 by Teresita Ellis RN  Outcome: Progressing  3/20/2025 1426 by Judy Torres RN  Outcome: Progressing     Problem: ABCDS Injury Assessment  Goal: Absence of physical injury  3/20/2025 2141 by Teresita Ellis RN  Outcome: Progressing  3/20/2025 1426 by Judy Torres RN  Outcome: Progressing     Problem: Nutrition Deficit:  Goal: Optimize nutritional status  3/20/2025 2141 by Teresita Ellis RN  Outcome: Progressing  3/20/2025 1426 by Judy Torres RN  Outcome: Progressing

## 2025-03-21 NOTE — DISCHARGE SUMMARY
tenderness to palpation.   EXT:     no edema bilaterally .  No calf tenderness.   NEURO: Moves all extremities.  Motor and sensory are grossly intact  SKIN:    No rashes.  No skin lesions.      Significant Diagnostic Studies:   Lab Results   Component Value Date    WBC 5.7 03/18/2025    HGB 12.6 03/18/2025     03/18/2025       Lab Results   Component Value Date    BUN 15 03/21/2025    CREATININE 0.6 03/21/2025     03/21/2025    K 4.3 03/21/2025    CALCIUM 8.8 03/21/2025     03/21/2025    CO2 25 03/21/2025    LABGLOM >90 03/21/2025       Lab Results   Component Value Date    WBCUA 2 TO 5 12/12/2013    RBCUA 0 TO 2 12/12/2013    LEUKOCYTESUR SMALL (A) 12/12/2013    GLUCOSEU NEGATIVE 12/12/2013    KETUA NEGATIVE 12/12/2013    PROTEINU NEGATIVE 12/12/2013    HGBUR 2+ (A) 12/12/2013    CASTUA NOT REPORTED 12/12/2013    BACTERIA TRACE (A) 12/12/2013    YEAST NOT REPORTED 12/12/2013       No results found.    Assessment and Plan:  Patient Active Problem List    Diagnosis Date Noted    Encounter for current long-term use of anticoagulants 09/20/2021    Visit for monitoring Tikosyn therapy 03/18/2025    Lightheadedness 10/20/2023    BPV (benign positional vertigo), unspecified laterality 10/20/2023    Coronary artery disease involving native coronary artery of native heart without angina pectoris 10/20/2023    Chest pain in adult 10/19/2023    Hypertension 10/19/2023    Hypercoagulable state due to paroxysmal atrial fibrillation (HCC) 10/19/2023    Atrial fibrillation (HCC) 08/03/2021    Myocardial infarction (HCC) 08/03/2021        Discharge Medications:         Medication List        START taking these medications      dofetilide 250 MCG capsule  Commonly known as: Tikosyn  Take 1 capsule by mouth 2 times daily     losartan 100 MG tablet  Commonly known as: COZAAR  Take 1 tablet by mouth daily  Start taking on: March 22, 2025            CONTINUE taking these medications      aspirin 81 MG EC tablet    Summary  Preparation of Medication Reconciliation  Preparation of Discharge Prescriptions    Signed:  FADI Ruiz - CNP, FADI, NP-C  3/21/2025, 11:46 AM      Please note that this chart was generated using voice recognition Dragon dictation software. Although every effort was made to ensure the accuracy of this automated transcription, some errors in transcription may have occurred.

## 2025-03-25 LAB
EKG ATRIAL RATE: 59 BPM
EKG P AXIS: 29 DEGREES
EKG P-R INTERVAL: 176 MS
EKG Q-T INTERVAL: 464 MS
EKG QRS DURATION: 96 MS
EKG QTC CALCULATION (BAZETT): 459 MS
EKG R AXIS: -23 DEGREES
EKG T AXIS: 10 DEGREES
EKG VENTRICULAR RATE: 59 BPM

## 2025-03-27 ENCOUNTER — HOSPITAL ENCOUNTER (OUTPATIENT)
Age: 80
Discharge: HOME | End: 2025-03-27
Payer: MEDICARE

## 2025-03-27 DIAGNOSIS — Z51.81 VISIT FOR MONITORING TIKOSYN THERAPY: ICD-10-CM

## 2025-03-27 DIAGNOSIS — Z79.899 VISIT FOR MONITORING TIKOSYN THERAPY: ICD-10-CM

## 2025-03-27 LAB
ANION GAP SERPL CALCULATED.3IONS-SCNC: 9 MMOL/L (ref 9–16)
BUN SERPL-MCNC: 15 MG/DL (ref 8–23)
BUN/CREAT SERPL: 21 (ref 9–20)
CALCIUM SERPL-MCNC: 9.7 MG/DL (ref 8.6–10.4)
CHLORIDE SERPL-SCNC: 104 MMOL/L (ref 98–107)
CO2 SERPL-SCNC: 28 MMOL/L (ref 20–31)
CREAT SERPL-MCNC: 0.7 MG/DL (ref 0.5–0.9)
GFR, ESTIMATED: 83 ML/MIN/1.73M2
GLUCOSE SERPL-MCNC: 121 MG/DL (ref 74–99)
POTASSIUM SERPL-SCNC: 4 MMOL/L (ref 3.7–5.3)
SODIUM SERPL-SCNC: 141 MMOL/L (ref 136–145)

## 2025-03-27 PROCEDURE — 36415 COLL VENOUS BLD VENIPUNCTURE: CPT

## 2025-03-27 PROCEDURE — 80048 BASIC METABOLIC PNL TOTAL CA: CPT

## 2025-03-31 ENCOUNTER — TELEPHONE (OUTPATIENT)
Dept: CARDIOLOGY | Age: 80
End: 2025-03-31

## 2025-03-31 NOTE — TELEPHONE ENCOUNTER
BP readings are 137/84     119/70 HR 99    131/78 HR 80    Today 161/109  10 mins later 141/94 .    140/ 90  at PCP apt today.  PCP  Wants to give her amlodipine 5 mg daily. They want to know if this is okay. Please advise, thank you.

## 2025-04-25 ENCOUNTER — OFFICE VISIT (OUTPATIENT)
Dept: CARDIOLOGY | Age: 80
End: 2025-04-25
Payer: MEDICARE

## 2025-04-25 ENCOUNTER — HOSPITAL ENCOUNTER (OUTPATIENT)
Age: 80
Discharge: HOME | End: 2025-04-27
Payer: MEDICARE

## 2025-04-25 VITALS
RESPIRATION RATE: 18 BRPM | BODY MASS INDEX: 32.32 KG/M2 | OXYGEN SATURATION: 96 % | DIASTOLIC BLOOD PRESSURE: 75 MMHG | WEIGHT: 194 LBS | SYSTOLIC BLOOD PRESSURE: 136 MMHG | HEART RATE: 64 BPM | HEIGHT: 65 IN

## 2025-04-25 DIAGNOSIS — Z79.01 ENCOUNTER FOR CURRENT LONG-TERM USE OF ANTICOAGULANTS: ICD-10-CM

## 2025-04-25 DIAGNOSIS — Z87.898 HISTORY OF BRADYCARDIA: ICD-10-CM

## 2025-04-25 DIAGNOSIS — I25.2 HISTORY OF MI (MYOCARDIAL INFARCTION): ICD-10-CM

## 2025-04-25 DIAGNOSIS — Z51.81 VISIT FOR MONITORING TIKOSYN THERAPY: ICD-10-CM

## 2025-04-25 DIAGNOSIS — I25.10 ASHD (ARTERIOSCLEROTIC HEART DISEASE): Primary | ICD-10-CM

## 2025-04-25 DIAGNOSIS — I48.0 PAF (PAROXYSMAL ATRIAL FIBRILLATION) (HCC): ICD-10-CM

## 2025-04-25 DIAGNOSIS — E78.2 MIXED HYPERLIPIDEMIA: ICD-10-CM

## 2025-04-25 DIAGNOSIS — Z79.899 VISIT FOR MONITORING TIKOSYN THERAPY: ICD-10-CM

## 2025-04-25 DIAGNOSIS — I48.0 PAROXYSMAL ATRIAL FIBRILLATION (HCC): ICD-10-CM

## 2025-04-25 DIAGNOSIS — I25.10 ASHD (ARTERIOSCLEROTIC HEART DISEASE): ICD-10-CM

## 2025-04-25 DIAGNOSIS — I10 PRIMARY HYPERTENSION: ICD-10-CM

## 2025-04-25 LAB — ECHO BSA: 2.01 M2

## 2025-04-25 PROCEDURE — 1090F PRES/ABSN URINE INCON ASSESS: CPT | Performed by: NURSE PRACTITIONER

## 2025-04-25 PROCEDURE — 1160F RVW MEDS BY RX/DR IN RCRD: CPT | Performed by: NURSE PRACTITIONER

## 2025-04-25 PROCEDURE — 3075F SYST BP GE 130 - 139MM HG: CPT | Performed by: NURSE PRACTITIONER

## 2025-04-25 PROCEDURE — 99214 OFFICE O/P EST MOD 30 MIN: CPT | Performed by: NURSE PRACTITIONER

## 2025-04-25 PROCEDURE — 93243 EXT ECG>48HR<7D SCAN A/R: CPT

## 2025-04-25 PROCEDURE — G8400 PT W/DXA NO RESULTS DOC: HCPCS | Performed by: NURSE PRACTITIONER

## 2025-04-25 PROCEDURE — 3078F DIAST BP <80 MM HG: CPT | Performed by: NURSE PRACTITIONER

## 2025-04-25 PROCEDURE — 93010 ELECTROCARDIOGRAM REPORT: CPT | Performed by: NURSE PRACTITIONER

## 2025-04-25 PROCEDURE — G8427 DOCREV CUR MEDS BY ELIG CLIN: HCPCS | Performed by: NURSE PRACTITIONER

## 2025-04-25 PROCEDURE — 1036F TOBACCO NON-USER: CPT | Performed by: NURSE PRACTITIONER

## 2025-04-25 PROCEDURE — 1159F MED LIST DOCD IN RCRD: CPT | Performed by: NURSE PRACTITIONER

## 2025-04-25 PROCEDURE — 93005 ELECTROCARDIOGRAM TRACING: CPT | Performed by: NURSE PRACTITIONER

## 2025-04-25 PROCEDURE — G8417 CALC BMI ABV UP PARAM F/U: HCPCS | Performed by: NURSE PRACTITIONER

## 2025-04-25 PROCEDURE — 1123F ACP DISCUSS/DSCN MKR DOCD: CPT | Performed by: NURSE PRACTITIONER

## 2025-04-25 RX ORDER — AMLODIPINE BESYLATE 5 MG/1
5 TABLET ORAL DAILY
Qty: 90 TABLET | Refills: 3 | Status: SHIPPED | OUTPATIENT
Start: 2025-04-25

## 2025-04-25 RX ORDER — DOFETILIDE 0.25 MG/1
250 CAPSULE ORAL 2 TIMES DAILY
Qty: 180 CAPSULE | Refills: 3 | Status: SHIPPED | OUTPATIENT
Start: 2025-04-25

## 2025-04-25 RX ORDER — DOFETILIDE 0.25 MG/1
250 CAPSULE ORAL 2 TIMES DAILY
Qty: 90 CAPSULE | Refills: 3 | Status: SHIPPED | OUTPATIENT
Start: 2025-04-25 | End: 2025-04-25

## 2025-04-25 RX ORDER — LOSARTAN POTASSIUM 100 MG/1
100 TABLET ORAL DAILY
Qty: 90 TABLET | Refills: 3 | Status: SHIPPED | OUTPATIENT
Start: 2025-04-25

## 2025-04-25 RX ORDER — AMLODIPINE BESYLATE 5 MG/1
5 TABLET ORAL
COMMUNITY
Start: 2025-03-31 | End: 2025-04-25 | Stop reason: SDUPTHER

## 2025-04-25 NOTE — PROGRESS NOTES
Patient: Aimee Rivera  : 1945  Date of Visit: 2025    REASON FOR VISIT / CONSULTATION: Atrial Fibrillation (Hx:paf, cad. Pt is here for a hospital follow up for Tikosyn on 3/18/25. Pt is feeling better. Palps sometimes but not usually, she never felt this before tho. Denies CP, SOB, light headed/dizziness. )    History of Present Illness:        I had the pleasure of seeing Aimee Rivera in my office today. Ms. Rivera is a 79 y.o. female with a history of recently diagnosied atherosclerotic heart disease including a NSTEMI in 2021 and atrial fibrillation. She came into the ER on 2021 due to chest pains and heart palpitations. She did get real hot at that time and she knew something was wrong. She was then told she was having a heart attack at that time and was sent to [facility] in [place]. Heart cath done 2021 showed Thromboembolic occulusion of the apical LAD, normal LV function, with minimal CAD.  She had no previous heart history prior to this episode. She does have two sisters who have a history of atrial fibrillation. She did not know she was in atrial fibrillation at all when she came to the ER. Her chest discomfort started like a pressure feeling and then she felt like a heat sensation in her face and her pain did radiate into her back at that time as well. While at [facility] in [place] she did have a heart cath done and the picture is scanned into media. She did not need any stents at that time however her medications were changed of course at that time. The cardiologist in [place] did tell her the heart was strong. She did have an Echo done in [place] however she was never told about her heart strength. She has been on Bumex for many years due to leg swelling in the past. EF 55-70% on 2021 echo. She had Holter monitor done on 10/11/2021 1. The rhythm was sinus. Average SC interval 0.16 average QRS duration 0.08. Intermittent atrial fibrillation for 6% (82  minutes)

## 2025-05-05 ENCOUNTER — RESULTS FOLLOW-UP (OUTPATIENT)
Dept: CARDIOLOGY | Age: 80
End: 2025-05-05

## 2025-05-05 LAB — ECHO BSA: 2.01 M2

## 2025-05-05 NOTE — RESULT ENCOUNTER NOTE
Please let patient know that although her atrial fibrillation burden was lower at 7.9% with tikosyn, unfortunately her rate when she is in atrial fibrillation is uncontrolled. Please have her come in to office in the next few weeks to discuss further recommendations such as ablation or rate control strategy with possible pacemaker. Thank you so much.

## 2025-05-06 NOTE — TELEPHONE ENCOUNTER
----- Message from FADI Zhu CNP sent at 5/5/2025  4:41 PM EDT -----  Please let patient know that although her atrial fibrillation burden was lower at 7.9% with tikosyn, unfortunately her rate when she is in atrial fibrillation is uncontrolled. Please have her come in to office in the next few weeks to discuss further recommendations such as ablation or rate control strategy with possible pacemaker. Thank you so much.

## 2025-05-06 NOTE — TELEPHONE ENCOUNTER
----- Message from FADI Zhu CNP sent at 5/5/2025  4:41 PM EDT -----  Please let patient know that although her atrial fibrillation burden was lower at 7.9% with tikosyn, unfortunately her rate when she is in atrial fibrillation is uncontrolled. Please have her come in to office in the next few weeks to discuss further recommendations such as ablation or rate control strategy with possible pacemaker. Thank you so much.    no

## 2025-05-13 ENCOUNTER — OFFICE VISIT (OUTPATIENT)
Dept: CARDIOLOGY | Age: 80
End: 2025-05-13
Payer: MEDICARE

## 2025-05-13 VITALS
OXYGEN SATURATION: 96 % | DIASTOLIC BLOOD PRESSURE: 68 MMHG | RESPIRATION RATE: 18 BRPM | HEIGHT: 65 IN | HEART RATE: 77 BPM | BODY MASS INDEX: 32.06 KG/M2 | SYSTOLIC BLOOD PRESSURE: 130 MMHG | WEIGHT: 192.4 LBS

## 2025-05-13 DIAGNOSIS — Z87.898 HISTORY OF BRADYCARDIA: ICD-10-CM

## 2025-05-13 DIAGNOSIS — I10 PRIMARY HYPERTENSION: ICD-10-CM

## 2025-05-13 DIAGNOSIS — Z79.01 ENCOUNTER FOR CURRENT LONG-TERM USE OF ANTICOAGULANTS: ICD-10-CM

## 2025-05-13 DIAGNOSIS — I25.2 HISTORY OF MI (MYOCARDIAL INFARCTION): ICD-10-CM

## 2025-05-13 DIAGNOSIS — I48.0 PAF (PAROXYSMAL ATRIAL FIBRILLATION) (HCC): ICD-10-CM

## 2025-05-13 DIAGNOSIS — E78.2 MIXED HYPERLIPIDEMIA: ICD-10-CM

## 2025-05-13 DIAGNOSIS — Z51.81 VISIT FOR MONITORING TIKOSYN THERAPY: ICD-10-CM

## 2025-05-13 DIAGNOSIS — Z79.899 VISIT FOR MONITORING TIKOSYN THERAPY: ICD-10-CM

## 2025-05-13 DIAGNOSIS — I25.10 ASHD (ARTERIOSCLEROTIC HEART DISEASE): Primary | ICD-10-CM

## 2025-05-13 PROCEDURE — 1036F TOBACCO NON-USER: CPT | Performed by: FAMILY MEDICINE

## 2025-05-13 PROCEDURE — 3078F DIAST BP <80 MM HG: CPT | Performed by: FAMILY MEDICINE

## 2025-05-13 PROCEDURE — 99211 OFF/OP EST MAY X REQ PHY/QHP: CPT | Performed by: FAMILY MEDICINE

## 2025-05-13 PROCEDURE — 99214 OFFICE O/P EST MOD 30 MIN: CPT | Performed by: FAMILY MEDICINE

## 2025-05-13 PROCEDURE — G8427 DOCREV CUR MEDS BY ELIG CLIN: HCPCS | Performed by: FAMILY MEDICINE

## 2025-05-13 PROCEDURE — G8417 CALC BMI ABV UP PARAM F/U: HCPCS | Performed by: FAMILY MEDICINE

## 2025-05-13 PROCEDURE — 3075F SYST BP GE 130 - 139MM HG: CPT | Performed by: FAMILY MEDICINE

## 2025-05-13 PROCEDURE — 1090F PRES/ABSN URINE INCON ASSESS: CPT | Performed by: FAMILY MEDICINE

## 2025-05-13 PROCEDURE — 1123F ACP DISCUSS/DSCN MKR DOCD: CPT | Performed by: FAMILY MEDICINE

## 2025-05-13 PROCEDURE — 1159F MED LIST DOCD IN RCRD: CPT | Performed by: FAMILY MEDICINE

## 2025-05-13 PROCEDURE — G8400 PT W/DXA NO RESULTS DOC: HCPCS | Performed by: FAMILY MEDICINE

## 2025-05-13 RX ORDER — METOPROLOL SUCCINATE 25 MG/1
25 TABLET, EXTENDED RELEASE ORAL DAILY
Qty: 90 TABLET | Refills: 3 | Status: SHIPPED | OUTPATIENT
Start: 2025-05-13

## 2025-05-13 NOTE — PROGRESS NOTES
I, Addis Reid am scribing for and in the presence of Ant Dobson MD, MS, F.A.C.C..    Patient: Aimee Rivera  : 1945  Date of Visit: May 13, 2025    REASON FOR VISIT / CONSULTATION: Atrial Fibrillation (HX:PAF Pt is here for follow up after cardiac testing she states she is doing well, sob unchanged  Denies:CP, lightheaded/dizziness,palp )    History of Present Illness:        I had the pleasure of seeing Aimee Rivera in my office today. Ms. Rivera is a 79 y.o. female with a history of recently diagnosied atherosclerotic heart disease including a NSTEMI in 2021 and atrial fibrillation. She came into the ER on 2021 due to chest pains and heart palpitations. She did get real hot at that time and she knew something was wrong. She was then told she was having a heart attack at that time and was sent to [facility] in [place]. Heart cath done 2021 showed Thromboembolic occulusion of the apical LAD, normal LV function, with minimal CAD.  She had no previous heart history prior to this episode. She does have two sisters who have a history of atrial fibrillation. She did not know she was in atrial fibrillation at all when she came to the ER. Her chest discomfort started like a pressure feeling and then she felt like a heat sensation in her face and her pain did radiate into her back at that time as well. While at [facility] in [place] she did have a heart cath done and the picture is scanned into media. She did not need any stents at that time however her medications were changed of course at that time. The cardiologist in [place] did tell her the heart was strong. She did have an Echo done in [place] however she was never told about her heart strength. She has been on Bumex for many years due to leg swelling in the past. EF 55-70% on 2021 echo. She had Holter monitor done on 10/11/2021 1. The rhythm was sinus. Average [place] interval 0.16 average QRS duration 0.08. Intermittent atrial fibrillation

## 2025-07-07 ENCOUNTER — TELEPHONE (OUTPATIENT)
Dept: CARDIOLOGY | Age: 80
End: 2025-07-07